# Patient Record
Sex: MALE | Race: WHITE | Employment: OTHER | URBAN - METROPOLITAN AREA
[De-identification: names, ages, dates, MRNs, and addresses within clinical notes are randomized per-mention and may not be internally consistent; named-entity substitution may affect disease eponyms.]

---

## 2018-03-13 ENCOUNTER — HOSPITAL ENCOUNTER (OUTPATIENT)
Age: 68
Discharge: HOME | End: 2018-03-13
Payer: MEDICARE

## 2018-03-13 LAB
ANION GAP SERPL CALCULATED.3IONS-SCNC: 7 MMOL/L (ref 7–16)
BUN BLDV-MCNC: 19 MG/DL (ref 8–23)
CALCIUM SERPL-MCNC: 9.5 MG/DL (ref 8.6–10.2)
CHLORIDE BLD-SCNC: 103 MMOL/L (ref 98–107)
CO2: 30 MMOL/L (ref 22–29)
CREAT SERPL-MCNC: 1 MG/DL (ref 0.7–1.2)
EKG ATRIAL RATE: 52 BPM
EKG P AXIS: 49 DEGREES
EKG P-R INTERVAL: 192 MS
EKG Q-T INTERVAL: 454 MS
EKG QRS DURATION: 98 MS
EKG QTC CALCULATION (BAZETT): 422 MS
EKG R AXIS: 14 DEGREES
EKG T AXIS: 15 DEGREES
EKG VENTRICULAR RATE: 52 BPM
GFR AFRICAN AMERICAN: >60
GFR NON-AFRICAN AMERICAN: >60 ML/MIN/1.73
GLUCOSE BLD-MCNC: 109 MG/DL (ref 74–109)
POTASSIUM SERPL-SCNC: 4.2 MMOL/L (ref 3.5–5)
SODIUM BLD-SCNC: 140 MMOL/L (ref 132–146)

## 2018-03-13 PROCEDURE — 80048 BASIC METABOLIC PNL TOTAL CA: CPT

## 2018-03-13 PROCEDURE — 36415 COLL VENOUS BLD VENIPUNCTURE: CPT

## 2018-03-13 PROCEDURE — 93005 ELECTROCARDIOGRAM TRACING: CPT | Performed by: ANESTHESIOLOGY

## 2018-03-14 ENCOUNTER — ANESTHESIA EVENT (OUTPATIENT)
Dept: OPERATING ROOM | Age: 68
End: 2018-03-14
Payer: MEDICARE

## 2018-03-14 ASSESSMENT — LIFESTYLE VARIABLES: SMOKING_STATUS: 0

## 2018-03-14 NOTE — ANESTHESIA PRE PROCEDURE
Diagnosis Date    DJD (degenerative joint disease) of knee     Hypertension        Past Surgical History:        Procedure Laterality Date    CERVICAL LAMINECTOMY  1989    JOINT REPLACEMENT      right knee    LUMBAR LAMINECTOMY  2010    MYRINGOTOMY Right 1 14 16    nasal endoscopy with biopsy,biopsy post nasal pharynx    MYRINGOTOMY AND TYMPANOSTOMY TUBE PLACEMENT Left 12/08/2016    TESTICLE SURGERY  2003    TONSILLECTOMY      TOTAL KNEE ARTHROPLASTY  1/7/2013    right        Social History:    Social History   Substance Use Topics    Smoking status: Never Smoker    Smokeless tobacco: Never Used    Alcohol use No                                Counseling given: Not Answered      Vital Signs (Current):   Vitals:    03/12/18 1407   Weight: 250 lb (113.4 kg)   Height: 6' (1.829 m)                                              BP Readings from Last 3 Encounters:   02/12/18 131/86   12/20/17 (!) 144/77   10/18/17 127/71       NPO Status:  >8.H                                                                               BMI:   Wt Readings from Last 3 Encounters:   02/12/18 259 lb 11.2 oz (117.8 kg)   12/20/17 259 lb 1.6 oz (117.5 kg)   10/18/17 253 lb 14.4 oz (115.2 kg)     Body mass index is 33.91 kg/m². CBC:   Lab Results   Component Value Date    WBC 12.6 01/10/2013    RBC 3.82 01/10/2013    HGB 12.2 01/10/2013    HCT 34.9 01/10/2013    MCV 91.3 01/10/2013    RDW 14.2 01/10/2013     01/10/2013       CMP:   Lab Results   Component Value Date     03/13/2018    K 4.2 03/13/2018     03/13/2018    CO2 30 03/13/2018    BUN 19 03/13/2018    CREATININE 1.0 03/13/2018    GFRAA >60 03/13/2018    LABGLOM >60 03/13/2018    GLUCOSE 109 03/13/2018    CALCIUM 9.5 03/13/2018       POC Tests: No results for input(s): POCGLU, POCNA, POCK, POCCL, POCBUN, POCHEMO, POCHCT in the last 72 hours.     Coags: No results found for: PROTIME, INR, APTT    HCG (If Applicable): No results found for: PREGTESTUR,

## 2018-03-15 ENCOUNTER — ANESTHESIA (OUTPATIENT)
Dept: OPERATING ROOM | Age: 68
End: 2018-03-15
Payer: MEDICARE

## 2018-03-15 ENCOUNTER — HOSPITAL ENCOUNTER (OUTPATIENT)
Age: 68
Setting detail: OUTPATIENT SURGERY
Discharge: HOME | End: 2018-03-15
Attending: OTOLARYNGOLOGY | Admitting: OTOLARYNGOLOGY
Payer: MEDICARE

## 2018-03-15 VITALS — OXYGEN SATURATION: 86 % | SYSTOLIC BLOOD PRESSURE: 111 MMHG | DIASTOLIC BLOOD PRESSURE: 60 MMHG

## 2018-03-15 VITALS
BODY MASS INDEX: 34.4 KG/M2 | RESPIRATION RATE: 14 BRPM | DIASTOLIC BLOOD PRESSURE: 76 MMHG | HEART RATE: 68 BPM | WEIGHT: 254 LBS | TEMPERATURE: 98.6 F | SYSTOLIC BLOOD PRESSURE: 126 MMHG | OXYGEN SATURATION: 98 % | HEIGHT: 72 IN

## 2018-03-15 PROCEDURE — 3600000002 HC SURGERY LEVEL 2 BASE: Performed by: OTOLARYNGOLOGY

## 2018-03-15 PROCEDURE — C1726 CATH, BAL DIL, NON-VASCULAR: HCPCS | Performed by: OTOLARYNGOLOGY

## 2018-03-15 PROCEDURE — 2580000003 HC RX 258

## 2018-03-15 PROCEDURE — 7100000011 HC PHASE II RECOVERY - ADDTL 15 MIN: Performed by: OTOLARYNGOLOGY

## 2018-03-15 PROCEDURE — 3700000001 HC ADD 15 MINUTES (ANESTHESIA): Performed by: OTOLARYNGOLOGY

## 2018-03-15 PROCEDURE — 2780000010 HC IMPLANT OTHER: Performed by: OTOLARYNGOLOGY

## 2018-03-15 PROCEDURE — 7100000001 HC PACU RECOVERY - ADDTL 15 MIN: Performed by: OTOLARYNGOLOGY

## 2018-03-15 PROCEDURE — 6360000002 HC RX W HCPCS: Performed by: NURSE ANESTHETIST, CERTIFIED REGISTERED

## 2018-03-15 PROCEDURE — 31299 UNLISTED PX ACCESSORY SINUS: CPT | Performed by: OTOLARYNGOLOGY

## 2018-03-15 PROCEDURE — 2500000003 HC RX 250 WO HCPCS: Performed by: NURSE ANESTHETIST, CERTIFIED REGISTERED

## 2018-03-15 PROCEDURE — 7100000010 HC PHASE II RECOVERY - FIRST 15 MIN: Performed by: OTOLARYNGOLOGY

## 2018-03-15 PROCEDURE — 69436 CREATE EARDRUM OPENING: CPT | Performed by: OTOLARYNGOLOGY

## 2018-03-15 PROCEDURE — 3600000012 HC SURGERY LEVEL 2 ADDTL 15MIN: Performed by: OTOLARYNGOLOGY

## 2018-03-15 PROCEDURE — 3700000000 HC ANESTHESIA ATTENDED CARE: Performed by: OTOLARYNGOLOGY

## 2018-03-15 PROCEDURE — 6370000000 HC RX 637 (ALT 250 FOR IP): Performed by: OTOLARYNGOLOGY

## 2018-03-15 PROCEDURE — 7100000000 HC PACU RECOVERY - FIRST 15 MIN: Performed by: OTOLARYNGOLOGY

## 2018-03-15 DEVICE — IMPLANTABLE DEVICE: Type: IMPLANTABLE DEVICE | Site: EAR | Status: FUNCTIONAL

## 2018-03-15 RX ORDER — FLUTICASONE PROPIONATE 50 MCG
2 SPRAY, SUSPENSION (ML) NASAL DAILY
Qty: 1 BOTTLE | Refills: 3 | Status: SHIPPED | OUTPATIENT
Start: 2018-03-15 | End: 2018-03-21 | Stop reason: CLARIF

## 2018-03-15 RX ORDER — MORPHINE SULFATE 2 MG/ML
1 INJECTION, SOLUTION INTRAMUSCULAR; INTRAVENOUS EVERY 5 MIN PRN
Status: DISCONTINUED | OUTPATIENT
Start: 2018-03-15 | End: 2018-03-15 | Stop reason: HOSPADM

## 2018-03-15 RX ORDER — MEPERIDINE HYDROCHLORIDE 50 MG/ML
12.5 INJECTION INTRAMUSCULAR; INTRAVENOUS; SUBCUTANEOUS EVERY 5 MIN PRN
Status: DISCONTINUED | OUTPATIENT
Start: 2018-03-15 | End: 2018-03-15 | Stop reason: HOSPADM

## 2018-03-15 RX ORDER — PROPOFOL 10 MG/ML
INJECTION, EMULSION INTRAVENOUS PRN
Status: DISCONTINUED | OUTPATIENT
Start: 2018-03-15 | End: 2018-03-15 | Stop reason: SDUPTHER

## 2018-03-15 RX ORDER — CIPROFLOXACIN AND DEXAMETHASONE 3; 1 MG/ML; MG/ML
3 SUSPENSION/ DROPS AURICULAR (OTIC) 2 TIMES DAILY
Qty: 7.5 ML | Refills: 1 | Status: SHIPPED | OUTPATIENT
Start: 2018-03-15 | End: 2018-03-18

## 2018-03-15 RX ORDER — GLYCOPYRROLATE 0.2 MG/ML
INJECTION INTRAMUSCULAR; INTRAVENOUS PRN
Status: DISCONTINUED | OUTPATIENT
Start: 2018-03-15 | End: 2018-03-15 | Stop reason: SDUPTHER

## 2018-03-15 RX ORDER — HYDROCODONE BITARTRATE AND ACETAMINOPHEN 5; 325 MG/1; MG/1
1 TABLET ORAL PRN
Status: DISCONTINUED | OUTPATIENT
Start: 2018-03-15 | End: 2018-03-15 | Stop reason: HOSPADM

## 2018-03-15 RX ORDER — SODIUM CHLORIDE 0.9 % (FLUSH) 0.9 %
10 SYRINGE (ML) INJECTION PRN
Status: DISCONTINUED | OUTPATIENT
Start: 2018-03-15 | End: 2018-03-15 | Stop reason: HOSPADM

## 2018-03-15 RX ORDER — SODIUM CHLORIDE, SODIUM LACTATE, POTASSIUM CHLORIDE, CALCIUM CHLORIDE 600; 310; 30; 20 MG/100ML; MG/100ML; MG/100ML; MG/100ML
INJECTION, SOLUTION INTRAVENOUS CONTINUOUS
Status: DISCONTINUED | OUTPATIENT
Start: 2018-03-15 | End: 2018-03-15 | Stop reason: HOSPADM

## 2018-03-15 RX ORDER — DEXAMETHASONE SODIUM PHOSPHATE 10 MG/ML
INJECTION, SOLUTION INTRAMUSCULAR; INTRAVENOUS PRN
Status: DISCONTINUED | OUTPATIENT
Start: 2018-03-15 | End: 2018-03-15 | Stop reason: SDUPTHER

## 2018-03-15 RX ORDER — CIPROFLOXACIN AND DEXAMETHASONE 3; 1 MG/ML; MG/ML
3 SUSPENSION/ DROPS AURICULAR (OTIC) 2 TIMES DAILY
Qty: 7.5 ML | Refills: 1 | Status: SHIPPED | OUTPATIENT
Start: 2018-03-15 | End: 2018-03-15

## 2018-03-15 RX ORDER — PROMETHAZINE HYDROCHLORIDE 25 MG/ML
25 INJECTION, SOLUTION INTRAMUSCULAR; INTRAVENOUS
Status: DISCONTINUED | OUTPATIENT
Start: 2018-03-15 | End: 2018-03-15 | Stop reason: HOSPADM

## 2018-03-15 RX ORDER — LABETALOL HYDROCHLORIDE 5 MG/ML
5 INJECTION, SOLUTION INTRAVENOUS EVERY 10 MIN PRN
Status: DISCONTINUED | OUTPATIENT
Start: 2018-03-15 | End: 2018-03-15 | Stop reason: HOSPADM

## 2018-03-15 RX ORDER — SODIUM CHLORIDE 0.9 % (FLUSH) 0.9 %
10 SYRINGE (ML) INJECTION EVERY 12 HOURS SCHEDULED
Status: DISCONTINUED | OUTPATIENT
Start: 2018-03-15 | End: 2018-03-15 | Stop reason: HOSPADM

## 2018-03-15 RX ORDER — FENTANYL CITRATE 50 UG/ML
50 INJECTION, SOLUTION INTRAMUSCULAR; INTRAVENOUS EVERY 5 MIN PRN
Status: DISCONTINUED | OUTPATIENT
Start: 2018-03-15 | End: 2018-03-15 | Stop reason: HOSPADM

## 2018-03-15 RX ORDER — LIDOCAINE HYDROCHLORIDE 20 MG/ML
INJECTION, SOLUTION EPIDURAL; INFILTRATION; INTRACAUDAL; PERINEURAL PRN
Status: DISCONTINUED | OUTPATIENT
Start: 2018-03-15 | End: 2018-03-15 | Stop reason: SDUPTHER

## 2018-03-15 RX ORDER — HYDRALAZINE HYDROCHLORIDE 20 MG/ML
5 INJECTION INTRAMUSCULAR; INTRAVENOUS EVERY 10 MIN PRN
Status: DISCONTINUED | OUTPATIENT
Start: 2018-03-15 | End: 2018-03-15 | Stop reason: HOSPADM

## 2018-03-15 RX ORDER — DIPHENHYDRAMINE HYDROCHLORIDE 50 MG/ML
12.5 INJECTION INTRAMUSCULAR; INTRAVENOUS
Status: DISCONTINUED | OUTPATIENT
Start: 2018-03-15 | End: 2018-03-15 | Stop reason: HOSPADM

## 2018-03-15 RX ORDER — HYDROCODONE BITARTRATE AND ACETAMINOPHEN 5; 325 MG/1; MG/1
2 TABLET ORAL PRN
Status: DISCONTINUED | OUTPATIENT
Start: 2018-03-15 | End: 2018-03-15 | Stop reason: HOSPADM

## 2018-03-15 RX ORDER — OXYMETAZOLINE HYDROCHLORIDE 0.05 G/100ML
SPRAY NASAL PRN
Status: DISCONTINUED | OUTPATIENT
Start: 2018-03-15 | End: 2018-03-15 | Stop reason: HOSPADM

## 2018-03-15 RX ORDER — MIDAZOLAM HYDROCHLORIDE 1 MG/ML
INJECTION INTRAMUSCULAR; INTRAVENOUS PRN
Status: DISCONTINUED | OUTPATIENT
Start: 2018-03-15 | End: 2018-03-15 | Stop reason: SDUPTHER

## 2018-03-15 RX ORDER — FENTANYL CITRATE 50 UG/ML
INJECTION, SOLUTION INTRAMUSCULAR; INTRAVENOUS PRN
Status: DISCONTINUED | OUTPATIENT
Start: 2018-03-15 | End: 2018-03-15 | Stop reason: SDUPTHER

## 2018-03-15 RX ORDER — ONDANSETRON 2 MG/ML
INJECTION INTRAMUSCULAR; INTRAVENOUS PRN
Status: DISCONTINUED | OUTPATIENT
Start: 2018-03-15 | End: 2018-03-15 | Stop reason: SDUPTHER

## 2018-03-15 RX ORDER — OFLOXACIN 3 MG/ML
SOLUTION/ DROPS OPHTHALMIC PRN
Status: DISCONTINUED | OUTPATIENT
Start: 2018-03-15 | End: 2018-03-15 | Stop reason: HOSPADM

## 2018-03-15 RX ADMIN — FENTANYL CITRATE 100 MCG: 50 INJECTION, SOLUTION INTRAMUSCULAR; INTRAVENOUS at 13:28

## 2018-03-15 RX ADMIN — GLYCOPYRROLATE 0.2 MG: 0.2 INJECTION INTRAMUSCULAR; INTRAVENOUS at 13:25

## 2018-03-15 RX ADMIN — SODIUM CHLORIDE, SODIUM LACTATE, POTASSIUM CHLORIDE, CALCIUM CHLORIDE: 600; 310; 30; 20 INJECTION, SOLUTION INTRAVENOUS at 11:20

## 2018-03-15 RX ADMIN — DEXAMETHASONE SODIUM PHOSPHATE 10 MG: 10 INJECTION, SOLUTION INTRAMUSCULAR; INTRAVENOUS at 13:37

## 2018-03-15 RX ADMIN — PROPOFOL 200 MG: 10 INJECTION, EMULSION INTRAVENOUS at 13:32

## 2018-03-15 RX ADMIN — LIDOCAINE HYDROCHLORIDE 50 MG: 20 INJECTION, SOLUTION EPIDURAL; INFILTRATION; INTRACAUDAL; PERINEURAL at 13:32

## 2018-03-15 RX ADMIN — FENTANYL CITRATE 50 MCG: 50 INJECTION, SOLUTION INTRAMUSCULAR; INTRAVENOUS at 13:24

## 2018-03-15 RX ADMIN — MIDAZOLAM HYDROCHLORIDE 2 MG: 1 INJECTION INTRAMUSCULAR; INTRAVENOUS at 13:24

## 2018-03-15 RX ADMIN — ONDANSETRON 4 MG: 2 INJECTION, SOLUTION INTRAMUSCULAR; INTRAVENOUS at 13:37

## 2018-03-15 ASSESSMENT — PULMONARY FUNCTION TESTS
PIF_VALUE: 19
PIF_VALUE: 0
PIF_VALUE: 19
PIF_VALUE: 18
PIF_VALUE: 18
PIF_VALUE: 3
PIF_VALUE: 19
PIF_VALUE: 10
PIF_VALUE: 19
PIF_VALUE: 20
PIF_VALUE: 1
PIF_VALUE: 18
PIF_VALUE: 0
PIF_VALUE: 25
PIF_VALUE: 21
PIF_VALUE: 4
PIF_VALUE: 1
PIF_VALUE: 4
PIF_VALUE: 0
PIF_VALUE: 19
PIF_VALUE: 4
PIF_VALUE: 36
PIF_VALUE: 1
PIF_VALUE: 22
PIF_VALUE: 16
PIF_VALUE: 0
PIF_VALUE: 15

## 2018-03-15 ASSESSMENT — PAIN - FUNCTIONAL ASSESSMENT: PAIN_FUNCTIONAL_ASSESSMENT: 0-10

## 2018-03-15 ASSESSMENT — PAIN SCALES - GENERAL
PAINLEVEL_OUTOF10: 0

## 2018-03-21 ENCOUNTER — OFFICE VISIT (OUTPATIENT)
Dept: ENT CLINIC | Age: 68
End: 2018-03-21

## 2018-03-21 VITALS
DIASTOLIC BLOOD PRESSURE: 76 MMHG | WEIGHT: 258.1 LBS | HEART RATE: 65 BPM | HEIGHT: 72 IN | BODY MASS INDEX: 34.96 KG/M2 | SYSTOLIC BLOOD PRESSURE: 137 MMHG

## 2018-03-21 DIAGNOSIS — H65.493 CHRONIC OTITIS MEDIA WITH EFFUSION, BILATERAL: Primary | ICD-10-CM

## 2018-03-21 PROCEDURE — 99024 POSTOP FOLLOW-UP VISIT: CPT | Performed by: OTOLARYNGOLOGY

## 2018-04-04 ASSESSMENT — ENCOUNTER SYMPTOMS
HEARTBURN: 0
VOMITING: 0
BLURRED VISION: 0
COUGH: 0
SHORTNESS OF BREATH: 0
DOUBLE VISION: 0

## 2018-05-22 ENCOUNTER — OFFICE VISIT (OUTPATIENT)
Dept: ORTHOPEDIC SURGERY | Age: 68
End: 2018-05-22
Payer: MEDICARE

## 2018-05-22 VITALS
BODY MASS INDEX: 33.86 KG/M2 | HEART RATE: 74 BPM | SYSTOLIC BLOOD PRESSURE: 134 MMHG | TEMPERATURE: 97.2 F | WEIGHT: 250 LBS | DIASTOLIC BLOOD PRESSURE: 79 MMHG | HEIGHT: 72 IN

## 2018-05-22 DIAGNOSIS — M76.62 TENDONITIS, ACHILLES, LEFT: Primary | ICD-10-CM

## 2018-05-22 PROCEDURE — 99203 OFFICE O/P NEW LOW 30 MIN: CPT | Performed by: ORTHOPAEDIC SURGERY

## 2018-05-22 PROCEDURE — G8427 DOCREV CUR MEDS BY ELIG CLIN: HCPCS | Performed by: ORTHOPAEDIC SURGERY

## 2018-05-22 PROCEDURE — 1123F ACP DISCUSS/DSCN MKR DOCD: CPT | Performed by: ORTHOPAEDIC SURGERY

## 2018-05-22 PROCEDURE — 4040F PNEUMOC VAC/ADMIN/RCVD: CPT | Performed by: ORTHOPAEDIC SURGERY

## 2018-05-22 PROCEDURE — G8417 CALC BMI ABV UP PARAM F/U: HCPCS | Performed by: ORTHOPAEDIC SURGERY

## 2018-05-22 PROCEDURE — 3017F COLORECTAL CA SCREEN DOC REV: CPT | Performed by: ORTHOPAEDIC SURGERY

## 2018-05-22 PROCEDURE — 1036F TOBACCO NON-USER: CPT | Performed by: ORTHOPAEDIC SURGERY

## 2018-05-23 ENCOUNTER — EVALUATION (OUTPATIENT)
Dept: PHYSICAL THERAPY | Age: 68
End: 2018-05-23
Payer: MEDICARE

## 2018-05-23 DIAGNOSIS — M62.562 MUSCLE WASTING AND ATROPHY, NOT ELSEWHERE CLASSIFIED, LEFT LOWER LEG: ICD-10-CM

## 2018-05-23 DIAGNOSIS — M76.62 TENDONITIS, ACHILLES, LEFT: Primary | ICD-10-CM

## 2018-05-23 PROCEDURE — 97535 SELF CARE MNGMENT TRAINING: CPT | Performed by: PHYSICAL THERAPIST

## 2018-05-23 PROCEDURE — 97161 PT EVAL LOW COMPLEX 20 MIN: CPT | Performed by: PHYSICAL THERAPIST

## 2018-05-23 PROCEDURE — G8978 MOBILITY CURRENT STATUS: HCPCS | Performed by: PHYSICAL THERAPIST

## 2018-05-23 PROCEDURE — G8979 MOBILITY GOAL STATUS: HCPCS | Performed by: PHYSICAL THERAPIST

## 2018-05-30 ENCOUNTER — TREATMENT (OUTPATIENT)
Dept: PHYSICAL THERAPY | Age: 68
End: 2018-05-30
Payer: MEDICARE

## 2018-05-30 DIAGNOSIS — M76.62 TENDONITIS, ACHILLES, LEFT: Primary | ICD-10-CM

## 2018-05-30 DIAGNOSIS — M62.562 MUSCLE WASTING AND ATROPHY, NOT ELSEWHERE CLASSIFIED, LEFT LOWER LEG: ICD-10-CM

## 2018-05-30 PROCEDURE — G8980 MOBILITY D/C STATUS: HCPCS | Performed by: PHYSICAL THERAPIST

## 2018-05-30 PROCEDURE — 97535 SELF CARE MNGMENT TRAINING: CPT | Performed by: PHYSICAL THERAPIST

## 2018-05-30 PROCEDURE — 97164 PT RE-EVAL EST PLAN CARE: CPT | Performed by: PHYSICAL THERAPIST

## 2018-05-30 PROCEDURE — G8979 MOBILITY GOAL STATUS: HCPCS | Performed by: PHYSICAL THERAPIST

## 2018-07-24 ENCOUNTER — OFFICE VISIT (OUTPATIENT)
Dept: ENT CLINIC | Age: 68
End: 2018-07-24
Payer: MEDICARE

## 2018-07-24 ENCOUNTER — PROCEDURE VISIT (OUTPATIENT)
Dept: AUDIOLOGY | Age: 68
End: 2018-07-24

## 2018-07-24 VITALS
DIASTOLIC BLOOD PRESSURE: 75 MMHG | HEART RATE: 64 BPM | HEIGHT: 72 IN | BODY MASS INDEX: 34.35 KG/M2 | WEIGHT: 253.6 LBS | SYSTOLIC BLOOD PRESSURE: 144 MMHG

## 2018-07-24 DIAGNOSIS — H69.83 ETD (EUSTACHIAN TUBE DYSFUNCTION), BILATERAL: ICD-10-CM

## 2018-07-24 DIAGNOSIS — H61.21 IMPACTED CERUMEN OF RIGHT EAR: Primary | ICD-10-CM

## 2018-07-24 DIAGNOSIS — Z96.22 S/P TYMPANOSTOMY TUBE PLACEMENT: ICD-10-CM

## 2018-07-24 DIAGNOSIS — Z09 OTITIS MEDIA FOLLOW-UP, INFECTION RESOLVED: Primary | ICD-10-CM

## 2018-07-24 DIAGNOSIS — Z86.69 OTITIS MEDIA FOLLOW-UP, INFECTION RESOLVED: Primary | ICD-10-CM

## 2018-07-24 PROCEDURE — 69210 REMOVE IMPACTED EAR WAX UNI: CPT | Performed by: OTOLARYNGOLOGY

## 2018-07-24 PROCEDURE — 4040F PNEUMOC VAC/ADMIN/RCVD: CPT | Performed by: OTOLARYNGOLOGY

## 2018-07-24 PROCEDURE — 3017F COLORECTAL CA SCREEN DOC REV: CPT | Performed by: OTOLARYNGOLOGY

## 2018-07-24 PROCEDURE — 99213 OFFICE O/P EST LOW 20 MIN: CPT | Performed by: OTOLARYNGOLOGY

## 2018-07-24 PROCEDURE — 1036F TOBACCO NON-USER: CPT | Performed by: OTOLARYNGOLOGY

## 2018-07-24 PROCEDURE — G8427 DOCREV CUR MEDS BY ELIG CLIN: HCPCS | Performed by: OTOLARYNGOLOGY

## 2018-07-24 PROCEDURE — 1123F ACP DISCUSS/DSCN MKR DOCD: CPT | Performed by: OTOLARYNGOLOGY

## 2018-07-24 PROCEDURE — 1101F PT FALLS ASSESS-DOCD LE1/YR: CPT | Performed by: OTOLARYNGOLOGY

## 2018-07-24 PROCEDURE — G8417 CALC BMI ABV UP PARAM F/U: HCPCS | Performed by: OTOLARYNGOLOGY

## 2018-07-24 ASSESSMENT — ENCOUNTER SYMPTOMS
DOUBLE VISION: 0
BLURRED VISION: 0
HEARTBURN: 0
COUGH: 0
VOMITING: 0
SHORTNESS OF BREATH: 0

## 2018-07-24 NOTE — PROGRESS NOTES
Tympanometry was attempted several times, but a seal could not be obtained, bilaterally, which indicates patent PE tubes.

## 2018-07-24 NOTE — PROGRESS NOTES
[facility] Otolaryngology  Dr. Kiley PORTER Suzie De La Vega, [address] Follow Up        Patient Name:  Laya Whitman  :  1950     CHIEF C/O:    Chief Complaint   Patient presents with    Follow-up     4 month follow up tubes right ear fullness        HISTORY OBTAINED FROM:  patient    HISTORY OF PRESENT ILLNESS:       Timo Quiroga is a 80y.o. year old male, here today for follow up of status post left myringotomy with eustachian tubal dilation 3/18 and right T-tube placed . Patient is doing very well, states that hearing is significantly improved without any ear pain or drainage. Patient has been using Flonase consistently. No current complaint of nasal congestion or epistaxis. Review of Systems   Constitutional: Negative for chills and fever. HENT: Positive for congestion. Negative for ear discharge and hearing loss. Eyes: Negative for blurred vision and double vision. Respiratory: Negative for cough and shortness of breath. Cardiovascular: Negative for chest pain and palpitations. Gastrointestinal: Negative for heartburn and vomiting. Skin: Negative for itching and rash. Neurological: Negative for dizziness, tingling and headaches. Endo/Heme/Allergies: Negative for environmental allergies. Does not bruise/bleed easily. All other systems reviewed and are negative. BP (!) 144/75 (Site: Left Arm, Position: Sitting, Cuff Size: Large Adult)   Pulse 64   Ht 6' (1.829 m)   Wt 253 lb 9.6 oz (115 kg)   BMI 34.39 kg/m²   Physical Exam   Constitutional: He is oriented to person, place, and time. He appears well-developed and well-nourished. HENT:   Ears:    Mouth/Throat:       Eyes: Conjunctivae and EOM are normal. Pupils are equal, round, and reactive to light. Neck: Normal range of motion. Neck supple. Cardiovascular: Normal rate, regular rhythm, normal heart sounds and intact distal pulses. Pulmonary/Chest: Effort normal and breath sounds normal.   Abdominal: Soft.  Bowel sounds

## 2018-08-22 ENCOUNTER — TELEPHONE (OUTPATIENT)
Dept: ENT CLINIC | Age: 68
End: 2018-08-22

## 2018-08-22 DIAGNOSIS — H69.83 ETD (EUSTACHIAN TUBE DYSFUNCTION), BILATERAL: ICD-10-CM

## 2018-08-23 RX ORDER — FLUTICASONE PROPIONATE 50 MCG
2 SPRAY, SUSPENSION (ML) NASAL DAILY
Qty: 1 BOTTLE | Refills: 3 | Status: SHIPPED | OUTPATIENT
Start: 2018-08-23 | End: 2019-05-03 | Stop reason: SDUPTHER

## 2018-11-27 ENCOUNTER — OFFICE VISIT (OUTPATIENT)
Dept: ENT CLINIC | Age: 68
End: 2018-11-27
Payer: MEDICARE

## 2018-11-27 ENCOUNTER — PROCEDURE VISIT (OUTPATIENT)
Dept: AUDIOLOGY | Age: 68
End: 2018-11-27
Payer: MEDICARE

## 2018-11-27 VITALS
BODY MASS INDEX: 34.1 KG/M2 | HEART RATE: 65 BPM | HEIGHT: 72 IN | SYSTOLIC BLOOD PRESSURE: 149 MMHG | WEIGHT: 251.8 LBS | DIASTOLIC BLOOD PRESSURE: 78 MMHG

## 2018-11-27 DIAGNOSIS — H90.0 CONDUCTIVE HEARING LOSS, BILATERAL: Primary | ICD-10-CM

## 2018-11-27 DIAGNOSIS — H65.493 COME (CHRONIC OTITIS MEDIA WITH EFFUSION), BILATERAL: ICD-10-CM

## 2018-11-27 DIAGNOSIS — H69.83 ETD (EUSTACHIAN TUBE DYSFUNCTION), BILATERAL: ICD-10-CM

## 2018-11-27 DIAGNOSIS — H65.33 CHRONIC MUCOID OTITIS MEDIA OF BOTH EARS: Primary | ICD-10-CM

## 2018-11-27 PROCEDURE — 99213 OFFICE O/P EST LOW 20 MIN: CPT | Performed by: OTOLARYNGOLOGY

## 2018-11-27 PROCEDURE — 1101F PT FALLS ASSESS-DOCD LE1/YR: CPT | Performed by: OTOLARYNGOLOGY

## 2018-11-27 PROCEDURE — 1123F ACP DISCUSS/DSCN MKR DOCD: CPT | Performed by: OTOLARYNGOLOGY

## 2018-11-27 PROCEDURE — G8417 CALC BMI ABV UP PARAM F/U: HCPCS | Performed by: OTOLARYNGOLOGY

## 2018-11-27 PROCEDURE — 1036F TOBACCO NON-USER: CPT | Performed by: OTOLARYNGOLOGY

## 2018-11-27 PROCEDURE — 3017F COLORECTAL CA SCREEN DOC REV: CPT | Performed by: OTOLARYNGOLOGY

## 2018-11-27 PROCEDURE — 4040F PNEUMOC VAC/ADMIN/RCVD: CPT | Performed by: OTOLARYNGOLOGY

## 2018-11-27 PROCEDURE — G8427 DOCREV CUR MEDS BY ELIG CLIN: HCPCS | Performed by: OTOLARYNGOLOGY

## 2018-11-27 PROCEDURE — 92567 TYMPANOMETRY: CPT | Performed by: AUDIOLOGIST

## 2018-11-27 PROCEDURE — G8484 FLU IMMUNIZE NO ADMIN: HCPCS | Performed by: OTOLARYNGOLOGY

## 2018-11-27 RX ORDER — FLUTICASONE PROPIONATE 50 MCG
SPRAY, SUSPENSION (ML) NASAL
Qty: 1 BOTTLE | Refills: 3 | Status: SHIPPED | OUTPATIENT
Start: 2018-11-27 | End: 2019-01-03 | Stop reason: SDUPTHER

## 2018-11-27 RX ORDER — CIPROFLOXACIN HYDROCHLORIDE 3.5 MG/ML
3 SOLUTION/ DROPS TOPICAL 2 TIMES DAILY
Qty: 1 BOTTLE | Refills: 0 | Status: SHIPPED | OUTPATIENT
Start: 2018-11-27 | End: 2019-01-18 | Stop reason: ALTCHOICE

## 2018-11-29 NOTE — PROGRESS NOTES
This patient was referred for tympanometric testing by Dr. Sade Echols. Patient being seen for a 4-month PE tube follow-up, with no acute issues. Tympanometry revealed a large ear canal volume, right ear and no seal could be obtained, left ear. These test results indicate patient PE tubes, bilaterally. Ipsilateral acoustic reflexes were present, right ear and could not be tested, lefte ar at 1000Hz. The results were reviewed with the patient. Recommendations for follow up will be made pending physician consult.     Alvina Napier

## 2018-12-11 ASSESSMENT — ENCOUNTER SYMPTOMS
TROUBLE SWALLOWING: 0
SHORTNESS OF BREATH: 0
SINUS PAIN: 0
COUGH: 0
VOMITING: 0
VOICE CHANGE: 0

## 2019-01-03 ENCOUNTER — OFFICE VISIT (OUTPATIENT)
Dept: ENT CLINIC | Age: 69
End: 2019-01-03
Payer: MEDICARE

## 2019-01-03 VITALS
WEIGHT: 257 LBS | SYSTOLIC BLOOD PRESSURE: 138 MMHG | HEIGHT: 72 IN | BODY MASS INDEX: 34.81 KG/M2 | HEART RATE: 65 BPM | DIASTOLIC BLOOD PRESSURE: 80 MMHG

## 2019-01-03 DIAGNOSIS — H92.21 BLEEDING FROM EAR, RIGHT: Primary | ICD-10-CM

## 2019-01-03 DIAGNOSIS — H66.91 RIGHT OTITIS MEDIA, UNSPECIFIED OTITIS MEDIA TYPE: ICD-10-CM

## 2019-01-03 DIAGNOSIS — Z96.22 HISTORY OF TYMPANOSTOMY TUBE PLACEMENT: ICD-10-CM

## 2019-01-03 PROCEDURE — 4040F PNEUMOC VAC/ADMIN/RCVD: CPT | Performed by: PHYSICIAN ASSISTANT

## 2019-01-03 PROCEDURE — G8427 DOCREV CUR MEDS BY ELIG CLIN: HCPCS | Performed by: PHYSICIAN ASSISTANT

## 2019-01-03 PROCEDURE — 99213 OFFICE O/P EST LOW 20 MIN: CPT | Performed by: PHYSICIAN ASSISTANT

## 2019-01-03 PROCEDURE — G8484 FLU IMMUNIZE NO ADMIN: HCPCS | Performed by: PHYSICIAN ASSISTANT

## 2019-01-03 PROCEDURE — 1123F ACP DISCUSS/DSCN MKR DOCD: CPT | Performed by: PHYSICIAN ASSISTANT

## 2019-01-03 PROCEDURE — G8417 CALC BMI ABV UP PARAM F/U: HCPCS | Performed by: PHYSICIAN ASSISTANT

## 2019-01-03 PROCEDURE — 1036F TOBACCO NON-USER: CPT | Performed by: PHYSICIAN ASSISTANT

## 2019-01-03 PROCEDURE — 92504 EAR MICROSCOPY EXAMINATION: CPT | Performed by: PHYSICIAN ASSISTANT

## 2019-01-03 PROCEDURE — 1101F PT FALLS ASSESS-DOCD LE1/YR: CPT | Performed by: PHYSICIAN ASSISTANT

## 2019-01-03 PROCEDURE — 3017F COLORECTAL CA SCREEN DOC REV: CPT | Performed by: PHYSICIAN ASSISTANT

## 2019-01-03 ASSESSMENT — ENCOUNTER SYMPTOMS
COUGH: 0
VOMITING: 0
TROUBLE SWALLOWING: 0
SHORTNESS OF BREATH: 0
SINUS PAIN: 0
VOICE CHANGE: 0

## 2019-01-07 ENCOUNTER — OFFICE VISIT (OUTPATIENT)
Dept: ENT CLINIC | Age: 69
End: 2019-01-07
Payer: MEDICARE

## 2019-01-07 ENCOUNTER — PROCEDURE VISIT (OUTPATIENT)
Dept: AUDIOLOGY | Age: 69
End: 2019-01-07
Payer: MEDICARE

## 2019-01-07 VITALS
BODY MASS INDEX: 35.99 KG/M2 | HEIGHT: 72 IN | SYSTOLIC BLOOD PRESSURE: 138 MMHG | HEART RATE: 65 BPM | WEIGHT: 265.7 LBS | DIASTOLIC BLOOD PRESSURE: 78 MMHG

## 2019-01-07 DIAGNOSIS — H65.33 CHRONIC MUCOID OTITIS MEDIA OF BOTH EARS: Primary | ICD-10-CM

## 2019-01-07 DIAGNOSIS — Z96.22 HISTORY OF TYMPANOSTOMY TUBE PLACEMENT: ICD-10-CM

## 2019-01-07 DIAGNOSIS — H92.21 BLEEDING FROM EAR, RIGHT: Primary | ICD-10-CM

## 2019-01-07 DIAGNOSIS — H69.83 ETD (EUSTACHIAN TUBE DYSFUNCTION), BILATERAL: ICD-10-CM

## 2019-01-07 PROCEDURE — 3017F COLORECTAL CA SCREEN DOC REV: CPT | Performed by: OTOLARYNGOLOGY

## 2019-01-07 PROCEDURE — 1036F TOBACCO NON-USER: CPT | Performed by: OTOLARYNGOLOGY

## 2019-01-07 PROCEDURE — 1101F PT FALLS ASSESS-DOCD LE1/YR: CPT | Performed by: OTOLARYNGOLOGY

## 2019-01-07 PROCEDURE — 92567 TYMPANOMETRY: CPT | Performed by: AUDIOLOGIST

## 2019-01-07 PROCEDURE — 99213 OFFICE O/P EST LOW 20 MIN: CPT | Performed by: OTOLARYNGOLOGY

## 2019-01-07 PROCEDURE — G8417 CALC BMI ABV UP PARAM F/U: HCPCS | Performed by: OTOLARYNGOLOGY

## 2019-01-07 PROCEDURE — 1123F ACP DISCUSS/DSCN MKR DOCD: CPT | Performed by: OTOLARYNGOLOGY

## 2019-01-07 PROCEDURE — G8427 DOCREV CUR MEDS BY ELIG CLIN: HCPCS | Performed by: OTOLARYNGOLOGY

## 2019-01-07 PROCEDURE — 4040F PNEUMOC VAC/ADMIN/RCVD: CPT | Performed by: OTOLARYNGOLOGY

## 2019-01-07 PROCEDURE — G8484 FLU IMMUNIZE NO ADMIN: HCPCS | Performed by: OTOLARYNGOLOGY

## 2019-01-07 ASSESSMENT — ENCOUNTER SYMPTOMS
VOICE CHANGE: 0
SINUS PAIN: 0
SHORTNESS OF BREATH: 0
VOMITING: 0
TROUBLE SWALLOWING: 0
COUGH: 0

## 2019-01-21 ENCOUNTER — HOSPITAL ENCOUNTER (OUTPATIENT)
Age: 69
Discharge: HOME | End: 2019-01-21
Payer: MEDICARE

## 2019-01-21 ENCOUNTER — HOSPITAL ENCOUNTER (OUTPATIENT)
Dept: NON INVASIVE DIAGNOSTICS | Age: 69
Discharge: HOME | End: 2019-01-21
Payer: MEDICARE

## 2019-01-21 LAB
ANION GAP SERPL CALCULATED.3IONS-SCNC: 10 MMOL/L (ref 7–16)
BUN BLDV-MCNC: 17 MG/DL (ref 8–23)
CALCIUM SERPL-MCNC: 9.2 MG/DL (ref 8.6–10.2)
CHLORIDE BLD-SCNC: 102 MMOL/L (ref 98–107)
CO2: 26 MMOL/L (ref 22–29)
CREAT SERPL-MCNC: 0.9 MG/DL (ref 0.7–1.2)
EKG ATRIAL RATE: 62 BPM
EKG P-R INTERVAL: 182 MS
EKG Q-T INTERVAL: 438 MS
EKG QRS DURATION: 98 MS
EKG QTC CALCULATION (BAZETT): 444 MS
EKG R AXIS: 9 DEGREES
EKG T AXIS: 18 DEGREES
EKG VENTRICULAR RATE: 62 BPM
GFR AFRICAN AMERICAN: >60
GFR NON-AFRICAN AMERICAN: >60 ML/MIN/1.73
GLUCOSE BLD-MCNC: 102 MG/DL (ref 74–99)
POTASSIUM SERPL-SCNC: 4.1 MMOL/L (ref 3.5–5)
SODIUM BLD-SCNC: 138 MMOL/L (ref 132–146)

## 2019-01-21 PROCEDURE — 80048 BASIC METABOLIC PNL TOTAL CA: CPT

## 2019-01-21 PROCEDURE — 93005 ELECTROCARDIOGRAM TRACING: CPT | Performed by: ANESTHESIOLOGY

## 2019-01-21 PROCEDURE — 36415 COLL VENOUS BLD VENIPUNCTURE: CPT

## 2019-01-23 ENCOUNTER — TELEPHONE (OUTPATIENT)
Dept: ADMINISTRATIVE | Age: 69
End: 2019-01-23

## 2019-01-23 ENCOUNTER — ANESTHESIA EVENT (OUTPATIENT)
Dept: OPERATING ROOM | Age: 69
End: 2019-01-23
Payer: MEDICARE

## 2019-01-23 PROCEDURE — 93010 ELECTROCARDIOGRAM REPORT: CPT | Performed by: INTERNAL MEDICINE

## 2019-01-23 ASSESSMENT — LIFESTYLE VARIABLES: SMOKING_STATUS: 0

## 2019-01-24 ENCOUNTER — ANESTHESIA (OUTPATIENT)
Dept: OPERATING ROOM | Age: 69
End: 2019-01-24
Payer: MEDICARE

## 2019-01-24 ENCOUNTER — HOSPITAL ENCOUNTER (OUTPATIENT)
Age: 69
Setting detail: OUTPATIENT SURGERY
Discharge: HOME | End: 2019-01-24
Attending: OTOLARYNGOLOGY | Admitting: OTOLARYNGOLOGY
Payer: MEDICARE

## 2019-01-24 VITALS
WEIGHT: 260 LBS | RESPIRATION RATE: 16 BRPM | HEIGHT: 72 IN | HEART RATE: 63 BPM | TEMPERATURE: 98.6 F | BODY MASS INDEX: 35.21 KG/M2 | SYSTOLIC BLOOD PRESSURE: 120 MMHG | DIASTOLIC BLOOD PRESSURE: 70 MMHG | OXYGEN SATURATION: 97 %

## 2019-01-24 VITALS
RESPIRATION RATE: 13 BRPM | OXYGEN SATURATION: 93 % | SYSTOLIC BLOOD PRESSURE: 87 MMHG | DIASTOLIC BLOOD PRESSURE: 54 MMHG

## 2019-01-24 PROCEDURE — 7100000000 HC PACU RECOVERY - FIRST 15 MIN: Performed by: OTOLARYNGOLOGY

## 2019-01-24 PROCEDURE — 6360000002 HC RX W HCPCS: Performed by: NURSE ANESTHETIST, CERTIFIED REGISTERED

## 2019-01-24 PROCEDURE — 3700000000 HC ANESTHESIA ATTENDED CARE: Performed by: OTOLARYNGOLOGY

## 2019-01-24 PROCEDURE — 2709999900 HC NON-CHARGEABLE SUPPLY: Performed by: OTOLARYNGOLOGY

## 2019-01-24 PROCEDURE — 3600000012 HC SURGERY LEVEL 2 ADDTL 15MIN: Performed by: OTOLARYNGOLOGY

## 2019-01-24 PROCEDURE — 7100000010 HC PHASE II RECOVERY - FIRST 15 MIN: Performed by: OTOLARYNGOLOGY

## 2019-01-24 PROCEDURE — 69436 CREATE EARDRUM OPENING: CPT | Performed by: OTOLARYNGOLOGY

## 2019-01-24 PROCEDURE — 2580000003 HC RX 258: Performed by: ANESTHESIOLOGY

## 2019-01-24 PROCEDURE — 6370000000 HC RX 637 (ALT 250 FOR IP): Performed by: OTOLARYNGOLOGY

## 2019-01-24 PROCEDURE — 2780000010 HC IMPLANT OTHER: Performed by: OTOLARYNGOLOGY

## 2019-01-24 PROCEDURE — 3600000002 HC SURGERY LEVEL 2 BASE: Performed by: OTOLARYNGOLOGY

## 2019-01-24 PROCEDURE — 2500000003 HC RX 250 WO HCPCS: Performed by: NURSE ANESTHETIST, CERTIFIED REGISTERED

## 2019-01-24 PROCEDURE — 7100000011 HC PHASE II RECOVERY - ADDTL 15 MIN: Performed by: OTOLARYNGOLOGY

## 2019-01-24 PROCEDURE — 3700000001 HC ADD 15 MINUTES (ANESTHESIA): Performed by: OTOLARYNGOLOGY

## 2019-01-24 PROCEDURE — 69799 UNLISTED PX MIDDLE EAR: CPT | Performed by: OTOLARYNGOLOGY

## 2019-01-24 PROCEDURE — 6370000000 HC RX 637 (ALT 250 FOR IP): Performed by: ANESTHESIOLOGY

## 2019-01-24 PROCEDURE — 7100000001 HC PACU RECOVERY - ADDTL 15 MIN: Performed by: OTOLARYNGOLOGY

## 2019-01-24 DEVICE — IMPLANTABLE DEVICE: Type: IMPLANTABLE DEVICE | Site: EAR | Status: FUNCTIONAL

## 2019-01-24 RX ORDER — CIPROFLOXACIN AND DEXAMETHASONE 3; 1 MG/ML; MG/ML
3 SUSPENSION/ DROPS AURICULAR (OTIC) 3 TIMES DAILY
Qty: 1 BOTTLE | Refills: 3 | Status: SHIPPED | OUTPATIENT
Start: 2019-01-24 | End: 2019-01-31

## 2019-01-24 RX ORDER — SODIUM CHLORIDE, SODIUM LACTATE, POTASSIUM CHLORIDE, CALCIUM CHLORIDE 600; 310; 30; 20 MG/100ML; MG/100ML; MG/100ML; MG/100ML
INJECTION, SOLUTION INTRAVENOUS CONTINUOUS
Status: DISCONTINUED | OUTPATIENT
Start: 2019-01-24 | End: 2019-01-24 | Stop reason: HOSPADM

## 2019-01-24 RX ORDER — FENTANYL CITRATE 50 UG/ML
50 INJECTION, SOLUTION INTRAMUSCULAR; INTRAVENOUS EVERY 5 MIN PRN
Status: DISCONTINUED | OUTPATIENT
Start: 2019-01-24 | End: 2019-01-24 | Stop reason: HOSPADM

## 2019-01-24 RX ORDER — LIDOCAINE HYDROCHLORIDE 20 MG/ML
INJECTION, SOLUTION INFILTRATION; PERINEURAL PRN
Status: DISCONTINUED | OUTPATIENT
Start: 2019-01-24 | End: 2019-01-24 | Stop reason: SDUPTHER

## 2019-01-24 RX ORDER — DIPHENHYDRAMINE HYDROCHLORIDE 50 MG/ML
12.5 INJECTION INTRAMUSCULAR; INTRAVENOUS
Status: DISCONTINUED | OUTPATIENT
Start: 2019-01-24 | End: 2019-01-24 | Stop reason: HOSPADM

## 2019-01-24 RX ORDER — SODIUM CHLORIDE 0.9 % (FLUSH) 0.9 %
10 SYRINGE (ML) INJECTION EVERY 12 HOURS SCHEDULED
Status: DISCONTINUED | OUTPATIENT
Start: 2019-01-24 | End: 2019-01-24 | Stop reason: HOSPADM

## 2019-01-24 RX ORDER — ONDANSETRON 2 MG/ML
INJECTION INTRAMUSCULAR; INTRAVENOUS PRN
Status: DISCONTINUED | OUTPATIENT
Start: 2019-01-24 | End: 2019-01-24 | Stop reason: SDUPTHER

## 2019-01-24 RX ORDER — LABETALOL HYDROCHLORIDE 5 MG/ML
5 INJECTION, SOLUTION INTRAVENOUS EVERY 10 MIN PRN
Status: DISCONTINUED | OUTPATIENT
Start: 2019-01-24 | End: 2019-01-24 | Stop reason: HOSPADM

## 2019-01-24 RX ORDER — FAMOTIDINE 20 MG/1
20 TABLET, FILM COATED ORAL ONCE
Status: COMPLETED | OUTPATIENT
Start: 2019-01-24 | End: 2019-01-24

## 2019-01-24 RX ORDER — HYDRALAZINE HYDROCHLORIDE 20 MG/ML
5 INJECTION INTRAMUSCULAR; INTRAVENOUS EVERY 10 MIN PRN
Status: DISCONTINUED | OUTPATIENT
Start: 2019-01-24 | End: 2019-01-24 | Stop reason: HOSPADM

## 2019-01-24 RX ORDER — METOCLOPRAMIDE 10 MG/1
10 TABLET ORAL ONCE
Status: COMPLETED | OUTPATIENT
Start: 2019-01-24 | End: 2019-01-24

## 2019-01-24 RX ORDER — PROMETHAZINE HYDROCHLORIDE 25 MG/ML
25 INJECTION, SOLUTION INTRAMUSCULAR; INTRAVENOUS
Status: DISCONTINUED | OUTPATIENT
Start: 2019-01-24 | End: 2019-01-24 | Stop reason: HOSPADM

## 2019-01-24 RX ORDER — HYDROCODONE BITARTRATE AND ACETAMINOPHEN 5; 325 MG/1; MG/1
1 TABLET ORAL PRN
Status: DISCONTINUED | OUTPATIENT
Start: 2019-01-24 | End: 2019-01-24 | Stop reason: HOSPADM

## 2019-01-24 RX ORDER — OFLOXACIN 3 MG/ML
SOLUTION/ DROPS OPHTHALMIC PRN
Status: DISCONTINUED | OUTPATIENT
Start: 2019-01-24 | End: 2019-01-24 | Stop reason: HOSPADM

## 2019-01-24 RX ORDER — PROPOFOL 10 MG/ML
INJECTION, EMULSION INTRAVENOUS PRN
Status: DISCONTINUED | OUTPATIENT
Start: 2019-01-24 | End: 2019-01-24 | Stop reason: SDUPTHER

## 2019-01-24 RX ORDER — MIDAZOLAM HYDROCHLORIDE 1 MG/ML
INJECTION INTRAMUSCULAR; INTRAVENOUS PRN
Status: DISCONTINUED | OUTPATIENT
Start: 2019-01-24 | End: 2019-01-24 | Stop reason: SDUPTHER

## 2019-01-24 RX ORDER — FENTANYL CITRATE 50 UG/ML
INJECTION, SOLUTION INTRAMUSCULAR; INTRAVENOUS PRN
Status: DISCONTINUED | OUTPATIENT
Start: 2019-01-24 | End: 2019-01-24 | Stop reason: SDUPTHER

## 2019-01-24 RX ORDER — HYDROCODONE BITARTRATE AND ACETAMINOPHEN 5; 325 MG/1; MG/1
2 TABLET ORAL PRN
Status: DISCONTINUED | OUTPATIENT
Start: 2019-01-24 | End: 2019-01-24 | Stop reason: HOSPADM

## 2019-01-24 RX ORDER — SODIUM CHLORIDE 0.9 % (FLUSH) 0.9 %
10 SYRINGE (ML) INJECTION PRN
Status: DISCONTINUED | OUTPATIENT
Start: 2019-01-24 | End: 2019-01-24 | Stop reason: HOSPADM

## 2019-01-24 RX ORDER — DEXAMETHASONE SODIUM PHOSPHATE 10 MG/ML
INJECTION, SOLUTION INTRAMUSCULAR; INTRAVENOUS PRN
Status: DISCONTINUED | OUTPATIENT
Start: 2019-01-24 | End: 2019-01-24 | Stop reason: SDUPTHER

## 2019-01-24 RX ORDER — MEPERIDINE HYDROCHLORIDE 25 MG/ML
12.5 INJECTION INTRAMUSCULAR; INTRAVENOUS; SUBCUTANEOUS EVERY 5 MIN PRN
Status: DISCONTINUED | OUTPATIENT
Start: 2019-01-24 | End: 2019-01-24 | Stop reason: HOSPADM

## 2019-01-24 RX ORDER — MORPHINE SULFATE 2 MG/ML
1 INJECTION, SOLUTION INTRAMUSCULAR; INTRAVENOUS EVERY 5 MIN PRN
Status: DISCONTINUED | OUTPATIENT
Start: 2019-01-24 | End: 2019-01-24 | Stop reason: HOSPADM

## 2019-01-24 RX ORDER — HYDROMORPHONE HYDROCHLORIDE 1 MG/ML
0.25 INJECTION, SOLUTION INTRAMUSCULAR; INTRAVENOUS; SUBCUTANEOUS EVERY 5 MIN PRN
Status: DISCONTINUED | OUTPATIENT
Start: 2019-01-24 | End: 2019-01-24 | Stop reason: HOSPADM

## 2019-01-24 RX ADMIN — FAMOTIDINE 20 MG: 20 TABLET ORAL at 08:25

## 2019-01-24 RX ADMIN — MIDAZOLAM 2 MG: 1 INJECTION INTRAMUSCULAR; INTRAVENOUS at 10:15

## 2019-01-24 RX ADMIN — FENTANYL CITRATE 50 MCG: 50 INJECTION, SOLUTION INTRAMUSCULAR; INTRAVENOUS at 10:26

## 2019-01-24 RX ADMIN — FENTANYL CITRATE 50 MCG: 50 INJECTION, SOLUTION INTRAMUSCULAR; INTRAVENOUS at 10:22

## 2019-01-24 RX ADMIN — PROPOFOL 200 MG: 10 INJECTION, EMULSION INTRAVENOUS at 10:22

## 2019-01-24 RX ADMIN — SODIUM CHLORIDE, POTASSIUM CHLORIDE, SODIUM LACTATE AND CALCIUM CHLORIDE: 600; 310; 30; 20 INJECTION, SOLUTION INTRAVENOUS at 08:40

## 2019-01-24 RX ADMIN — LIDOCAINE HYDROCHLORIDE 50 MG: 20 INJECTION, SOLUTION INFILTRATION; PERINEURAL at 10:22

## 2019-01-24 RX ADMIN — METOCLOPRAMIDE 10 MG: 10 TABLET ORAL at 08:40

## 2019-01-24 RX ADMIN — DEXAMETHASONE SODIUM PHOSPHATE 10 MG: 10 INJECTION, SOLUTION INTRAMUSCULAR; INTRAVENOUS at 10:31

## 2019-01-24 RX ADMIN — ONDANSETRON HYDROCHLORIDE 4 MG: 2 INJECTION, SOLUTION INTRAMUSCULAR; INTRAVENOUS at 10:31

## 2019-01-24 ASSESSMENT — PULMONARY FUNCTION TESTS
PIF_VALUE: 19
PIF_VALUE: 21
PIF_VALUE: 3
PIF_VALUE: 14
PIF_VALUE: 13
PIF_VALUE: 24
PIF_VALUE: 2
PIF_VALUE: 15
PIF_VALUE: 16
PIF_VALUE: 2
PIF_VALUE: 2
PIF_VALUE: 18
PIF_VALUE: 18
PIF_VALUE: 13
PIF_VALUE: 2
PIF_VALUE: 2
PIF_VALUE: 15
PIF_VALUE: 13
PIF_VALUE: 2
PIF_VALUE: 16
PIF_VALUE: 3
PIF_VALUE: 2
PIF_VALUE: 17
PIF_VALUE: 2
PIF_VALUE: 14
PIF_VALUE: 18
PIF_VALUE: 14
PIF_VALUE: 20
PIF_VALUE: 1

## 2019-01-24 ASSESSMENT — PAIN SCALES - GENERAL
PAINLEVEL_OUTOF10: 0

## 2019-01-24 ASSESSMENT — PAIN - FUNCTIONAL ASSESSMENT: PAIN_FUNCTIONAL_ASSESSMENT: 0-10

## 2019-02-04 ENCOUNTER — OFFICE VISIT (OUTPATIENT)
Dept: ENT CLINIC | Age: 69
End: 2019-02-04

## 2019-02-04 VITALS
OXYGEN SATURATION: 94 % | BODY MASS INDEX: 35.21 KG/M2 | SYSTOLIC BLOOD PRESSURE: 131 MMHG | WEIGHT: 260 LBS | HEIGHT: 72 IN | DIASTOLIC BLOOD PRESSURE: 66 MMHG | HEART RATE: 68 BPM

## 2019-02-04 DIAGNOSIS — H65.491 CHRONIC OTITIS MEDIA OF RIGHT EAR WITH EFFUSION: Primary | ICD-10-CM

## 2019-02-04 PROCEDURE — 99024 POSTOP FOLLOW-UP VISIT: CPT | Performed by: OTOLARYNGOLOGY

## 2019-05-03 RX ORDER — FLUTICASONE PROPIONATE 50 MCG
2 SPRAY, SUSPENSION (ML) NASAL DAILY
Qty: 1 BOTTLE | Refills: 3 | Status: SHIPPED | OUTPATIENT
Start: 2019-05-03 | End: 2019-05-21 | Stop reason: SDUPTHER

## 2019-05-03 NOTE — TELEPHONE ENCOUNTER
Received fax from [facility] asking for a refill on patient's Flonase. Last Appointment was on 1/7/2019 and patient's Next Appointment is 5/21/2019.  Medication is pended

## 2019-05-17 ENCOUNTER — HOSPITAL ENCOUNTER (OUTPATIENT)
Age: 69
Discharge: HOME | End: 2019-05-19
Payer: MEDICARE

## 2019-05-17 LAB
ALBUMIN SERPL-MCNC: 4.3 G/DL (ref 3.5–5.2)
ALP BLD-CCNC: 53 U/L (ref 40–129)
ALT SERPL-CCNC: 13 U/L (ref 0–40)
ANION GAP SERPL CALCULATED.3IONS-SCNC: 15 MMOL/L (ref 7–16)
AST SERPL-CCNC: 23 U/L (ref 0–39)
BASOPHILS ABSOLUTE: 0.12 E9/L (ref 0–0.2)
BASOPHILS RELATIVE PERCENT: 1.5 % (ref 0–2)
BILIRUB SERPL-MCNC: 0.6 MG/DL (ref 0–1.2)
BUN BLDV-MCNC: 16 MG/DL (ref 8–23)
CALCIUM SERPL-MCNC: 9.3 MG/DL (ref 8.6–10.2)
CHLORIDE BLD-SCNC: 104 MMOL/L (ref 98–107)
CHOLESTEROL, TOTAL: 166 MG/DL (ref 0–199)
CO2: 23 MMOL/L (ref 22–29)
CREAT SERPL-MCNC: 1 MG/DL (ref 0.7–1.2)
CREATININE URINE: 119 MG/DL (ref 40–278)
EOSINOPHILS ABSOLUTE: 0.19 E9/L (ref 0.05–0.5)
EOSINOPHILS RELATIVE PERCENT: 2.3 % (ref 0–6)
GFR AFRICAN AMERICAN: >60
GFR NON-AFRICAN AMERICAN: >60 ML/MIN/1.73
GLUCOSE BLD-MCNC: 108 MG/DL (ref 74–99)
HBA1C MFR BLD: 5.7 % (ref 4–5.6)
HCT VFR BLD CALC: 42.4 % (ref 37–54)
HDLC SERPL-MCNC: 36 MG/DL
HEMOGLOBIN: 14.1 G/DL (ref 12.5–16.5)
IMMATURE GRANULOCYTES #: 0.05 E9/L
IMMATURE GRANULOCYTES %: 0.6 % (ref 0–5)
LDL CHOLESTEROL CALCULATED: 104 MG/DL (ref 0–99)
LYMPHOCYTES ABSOLUTE: 1.86 E9/L (ref 1.5–4)
LYMPHOCYTES RELATIVE PERCENT: 22.7 % (ref 20–42)
MCH RBC QN AUTO: 30.6 PG (ref 26–35)
MCHC RBC AUTO-ENTMCNC: 33.3 % (ref 32–34.5)
MCV RBC AUTO: 92 FL (ref 80–99.9)
MICROALBUMIN UR-MCNC: <12 MG/L
MICROALBUMIN/CREAT UR-RTO: ABNORMAL (ref 0–30)
MONOCYTES ABSOLUTE: 0.6 E9/L (ref 0.1–0.95)
MONOCYTES RELATIVE PERCENT: 7.3 % (ref 2–12)
NEUTROPHILS ABSOLUTE: 5.36 E9/L (ref 1.8–7.3)
NEUTROPHILS RELATIVE PERCENT: 65.6 % (ref 43–80)
PDW BLD-RTO: 13.6 FL (ref 11.5–15)
PLATELET # BLD: 266 E9/L (ref 130–450)
PMV BLD AUTO: 10.7 FL (ref 7–12)
POTASSIUM SERPL-SCNC: 4.2 MMOL/L (ref 3.5–5)
PROSTATE SPECIFIC ANTIGEN: 3.57 NG/ML (ref 0–4)
RBC # BLD: 4.61 E12/L (ref 3.8–5.8)
SODIUM BLD-SCNC: 142 MMOL/L (ref 132–146)
TOTAL CK: 197 U/L (ref 20–200)
TOTAL PROTEIN: 7 G/DL (ref 6.4–8.3)
TRIGL SERPL-MCNC: 130 MG/DL (ref 0–149)
URIC ACID, SERUM: 6.3 MG/DL (ref 3.4–7)
VITAMIN D 25-HYDROXY: 23 NG/ML (ref 30–100)
VLDLC SERPL CALC-MCNC: 26 MG/DL
WBC # BLD: 8.2 E9/L (ref 4.5–11.5)

## 2019-05-17 PROCEDURE — 80061 LIPID PANEL: CPT

## 2019-05-17 PROCEDURE — 80053 COMPREHEN METABOLIC PANEL: CPT

## 2019-05-17 PROCEDURE — 85025 COMPLETE CBC W/AUTO DIFF WBC: CPT

## 2019-05-17 PROCEDURE — 82306 VITAMIN D 25 HYDROXY: CPT

## 2019-05-17 PROCEDURE — 36415 COLL VENOUS BLD VENIPUNCTURE: CPT

## 2019-05-17 PROCEDURE — G0103 PSA SCREENING: HCPCS

## 2019-05-17 PROCEDURE — 82044 UR ALBUMIN SEMIQUANTITATIVE: CPT

## 2019-05-17 PROCEDURE — 84550 ASSAY OF BLOOD/URIC ACID: CPT

## 2019-05-17 PROCEDURE — 82570 ASSAY OF URINE CREATININE: CPT

## 2019-05-17 PROCEDURE — 82550 ASSAY OF CK (CPK): CPT

## 2019-05-17 PROCEDURE — 83036 HEMOGLOBIN GLYCOSYLATED A1C: CPT

## 2019-05-21 ENCOUNTER — PROCEDURE VISIT (OUTPATIENT)
Dept: AUDIOLOGY | Age: 69
End: 2019-05-21
Payer: MEDICARE

## 2019-05-21 ENCOUNTER — OFFICE VISIT (OUTPATIENT)
Dept: ENT CLINIC | Age: 69
End: 2019-05-21
Payer: MEDICARE

## 2019-05-21 VITALS
HEART RATE: 58 BPM | WEIGHT: 259.7 LBS | BODY MASS INDEX: 35.18 KG/M2 | HEIGHT: 72 IN | SYSTOLIC BLOOD PRESSURE: 157 MMHG | DIASTOLIC BLOOD PRESSURE: 79 MMHG

## 2019-05-21 DIAGNOSIS — H69.83 ETD (EUSTACHIAN TUBE DYSFUNCTION), BILATERAL: Primary | ICD-10-CM

## 2019-05-21 DIAGNOSIS — H69.83 CHRONIC EUSTACHIAN TUBE DYSFUNCTION, BILATERAL: Primary | ICD-10-CM

## 2019-05-21 PROCEDURE — G8417 CALC BMI ABV UP PARAM F/U: HCPCS | Performed by: OTOLARYNGOLOGY

## 2019-05-21 PROCEDURE — 1123F ACP DISCUSS/DSCN MKR DOCD: CPT | Performed by: OTOLARYNGOLOGY

## 2019-05-21 PROCEDURE — 99213 OFFICE O/P EST LOW 20 MIN: CPT | Performed by: OTOLARYNGOLOGY

## 2019-05-21 PROCEDURE — 92567 TYMPANOMETRY: CPT | Performed by: AUDIOLOGIST

## 2019-05-21 PROCEDURE — 4040F PNEUMOC VAC/ADMIN/RCVD: CPT | Performed by: OTOLARYNGOLOGY

## 2019-05-21 PROCEDURE — G8427 DOCREV CUR MEDS BY ELIG CLIN: HCPCS | Performed by: OTOLARYNGOLOGY

## 2019-05-21 PROCEDURE — 3017F COLORECTAL CA SCREEN DOC REV: CPT | Performed by: OTOLARYNGOLOGY

## 2019-05-21 PROCEDURE — 1036F TOBACCO NON-USER: CPT | Performed by: OTOLARYNGOLOGY

## 2019-05-21 RX ORDER — FLUTICASONE PROPIONATE 50 MCG
2 SPRAY, SUSPENSION (ML) NASAL DAILY
Qty: 1 BOTTLE | Refills: 3 | Status: SHIPPED | OUTPATIENT
Start: 2019-05-21 | End: 2019-09-19 | Stop reason: SDUPTHER

## 2019-05-21 NOTE — PROGRESS NOTES
Tympanometry was attempted several times and a seal could not be obtained, bilaterally. Test results are indicative of patent PE tubes, bilaterally.   NOTE:  PE tube not visualized, left ear, during otoscopic exam.

## 2019-05-21 NOTE — PROGRESS NOTES
[facility] Otolaryngology  Dr. Chip Washington. Jada Mendoza. Ms.Ed        Patient Name:  Raul Franklin  :  1950     CHIEF C/O:    Chief Complaint   Patient presents with    Follow-up     6 mo tubes; no problems       HISTORY OBTAINED FROM:  patient    HISTORY OF PRESENT ILLNESS:       Sandrita Shah is a 77y.o. year old male, here today for follow up of history of chronic otitis media with effusion status post bilateral tympanostomy tubes with recent replacement of the left ear tube. Patient denies any complaints of ear drainage pain hearing loss or vertigo. No current complaints of facial pressure sore throat no complaint of nasal congestion continuing nasal steroids without any history of epistaxis. Past Medical History:   Diagnosis Date    DJD (degenerative joint disease) of knee     Hypertension      Past Surgical History:   Procedure Laterality Date    CERVICAL LAMINECTOMY      JOINT REPLACEMENT      right knee    LUMBAR LAMINECTOMY      MYRINGOTOMY Right 2016    Nasal endoscopy with biopsy,biopsy post nasal pharynx    MYRINGOTOMY Right 2019    with right eustachian tube balloon dilation    MYRINGOTOMY AND TYMPANOSTOMY TUBE PLACEMENT Left 2016    Left myringotomy with T-tube insertion    MYRINGOTOMY AND TYMPANOSTOMY TUBE PLACEMENT Left 03/15/2018    Left myringotomy with tympanostomy tube placement     MYRINGOTOMY AND TYMPANOSTOMY TUBE PLACEMENT Right 2019    RIGHT MYRINGOTOMY TUBE REPLACEMENT. RIGHT EUSTACHIAN TUBE DILATION performed by Hugh Medeiros DO at [address] [facility] Left 03/15/2018    Nasal endoscopy with eustachian tube balloon dilation, left.     [place] OFFICE/OUTPT VISIT,PROCEDURE ONLY Left 3/15/2018    LEFT EUSTACHIAN TUBE BALLOON DILATION AND LEFT MYRINGOTOMY WITH T-TUBE INSERTION performed by Hugh Medeiros DO at [address]      TONSILLECTOMY      TOTAL KNEE ARTHROPLASTY Right 2013    Right TKA  T. MD Dayron       Current Outpatient Medications:     fluticasone (FLONASE) 50 MCG/ACT nasal spray, 2 sprays by Nasal route daily 2 sprays each nostril once daily, Disp: 1 Bottle, Rfl: 3    verapamil (VERELAN PM) 240 MG CR capsule, Take 240 mg by mouth 2 times daily , Disp: , Rfl:     cloNIDine (CATAPRES) 0.1 MG tablet, Take 0.1 mg by mouth nightly. 2 tablets nightly , Disp: , Rfl:     lisinopril-hydrochlorothiazide (PRINZIDE;ZESTORETIC) 20-12.5 MG per tablet, Take 1 tablet by mouth daily , Disp: , Rfl:   Other  Social History     Tobacco Use    Smoking status: Never Smoker    Smokeless tobacco: Never Used   Substance Use Topics    Alcohol use: No     Alcohol/week: 0.0 oz    Drug use: No     No family history on file. Review of Systems   Constitutional: Negative for chills and fever. HENT: Positive for congestion. Negative for dental problem, ear discharge, hearing loss, sinus pressure, sneezing, sore throat, tinnitus and trouble swallowing. Respiratory: Negative for cough and shortness of breath. Cardiovascular: Negative for chest pain and palpitations. Gastrointestinal: Negative for vomiting. Skin: Negative for rash. Allergic/Immunologic: Negative for environmental allergies. Neurological: Negative for dizziness and headaches. Hematological: Does not bruise/bleed easily. All other systems reviewed and are negative. BP (!) 157/79 (Site: Left Upper Arm, Position: Sitting, Cuff Size: Large Adult)   Pulse 58   Ht 6' (1.829 m)   Wt 259 lb 11.2 oz (117.8 kg)   BMI 35.22 kg/m²   Physical Exam   Constitutional: He appears well-developed and well-nourished. HENT:   Head: Normocephalic. Right Ear: External ear and ear canal normal.   Left Ear: External ear and ear canal normal.   Nose: No mucosal edema or rhinorrhea. Right sinus exhibits no maxillary sinus tenderness and no frontal sinus tenderness.  Left sinus exhibits no maxillary sinus tenderness and no frontal sinus tenderness. Mouth/Throat: Oropharynx is clear and moist. Mucous membranes are not dry and not cyanotic. Normal dentition. No uvula swelling or lacerations. No oropharyngeal exudate, posterior oropharyngeal erythema or tonsillar abscesses. Eyes: Pupils are equal, round, and reactive to light. EOM are normal.   Neck: Normal range of motion. No tracheal deviation present. No thyromegaly present. Cardiovascular: Normal rate and intact distal pulses. Pulmonary/Chest: Effort normal. No respiratory distress. Musculoskeletal: Normal range of motion. He exhibits no edema. Lymphadenopathy:     He has no cervical adenopathy. Neurological: He is alert. No cranial nerve deficit. Skin: Skin is warm. No erythema. Nursing note and vitals reviewed. IMPRESSION/PLAN:  Patient with recurrent otitis media middle ear effusions has responded significantly to medical therapy, and surgical interventions without any complications at this time continue follow-up in 3 months water precautions reviewed. Dr. Reola Hatchet D. Bunevich D.O. Ms. Stan Folk.  Otolaryngology Facial Plastic Surgery  :  [address] [place] [address] Otolaryngology/Facial Plastic Surgery Residency  Associate Clinical Professor:  Nathan Jordan, [facility]

## 2019-06-15 ASSESSMENT — ENCOUNTER SYMPTOMS
TROUBLE SWALLOWING: 0
SHORTNESS OF BREATH: 0
SORE THROAT: 0
COUGH: 0
VOMITING: 0
SINUS PRESSURE: 0

## 2019-09-19 ENCOUNTER — PROCEDURE VISIT (OUTPATIENT)
Dept: AUDIOLOGY | Age: 69
End: 2019-09-19
Payer: MEDICARE

## 2019-09-19 ENCOUNTER — OFFICE VISIT (OUTPATIENT)
Dept: ENT CLINIC | Age: 69
End: 2019-09-19
Payer: MEDICARE

## 2019-09-19 VITALS
SYSTOLIC BLOOD PRESSURE: 140 MMHG | HEART RATE: 76 BPM | DIASTOLIC BLOOD PRESSURE: 80 MMHG | WEIGHT: 253 LBS | BODY MASS INDEX: 34.27 KG/M2 | HEIGHT: 72 IN

## 2019-09-19 DIAGNOSIS — H65.31 CHRONIC SECRETORY OTITIS MEDIA OF RIGHT EAR: ICD-10-CM

## 2019-09-19 DIAGNOSIS — Z96.22 HISTORY OF TYMPANOSTOMY TUBE PLACEMENT: ICD-10-CM

## 2019-09-19 DIAGNOSIS — H69.82 ETD (EUSTACHIAN TUBE DYSFUNCTION), LEFT: ICD-10-CM

## 2019-09-19 DIAGNOSIS — H61.22 EXCESSIVE CERUMEN IN EAR CANAL, LEFT: Primary | ICD-10-CM

## 2019-09-19 DIAGNOSIS — H69.81 ETD (EUSTACHIAN TUBE DYSFUNCTION), RIGHT: ICD-10-CM

## 2019-09-19 DIAGNOSIS — H65.491 COME (CHRONIC OTITIS MEDIA WITH EFFUSION), RIGHT: ICD-10-CM

## 2019-09-19 DIAGNOSIS — H69.83 ETD (EUSTACHIAN TUBE DYSFUNCTION), BILATERAL: ICD-10-CM

## 2019-09-19 PROCEDURE — 69210 REMOVE IMPACTED EAR WAX UNI: CPT | Performed by: PHYSICIAN ASSISTANT

## 2019-09-19 PROCEDURE — 99213 OFFICE O/P EST LOW 20 MIN: CPT | Performed by: PHYSICIAN ASSISTANT

## 2019-09-19 PROCEDURE — G8427 DOCREV CUR MEDS BY ELIG CLIN: HCPCS | Performed by: PHYSICIAN ASSISTANT

## 2019-09-19 PROCEDURE — 1123F ACP DISCUSS/DSCN MKR DOCD: CPT | Performed by: PHYSICIAN ASSISTANT

## 2019-09-19 PROCEDURE — 4040F PNEUMOC VAC/ADMIN/RCVD: CPT | Performed by: PHYSICIAN ASSISTANT

## 2019-09-19 PROCEDURE — 3017F COLORECTAL CA SCREEN DOC REV: CPT | Performed by: PHYSICIAN ASSISTANT

## 2019-09-19 PROCEDURE — 1036F TOBACCO NON-USER: CPT | Performed by: PHYSICIAN ASSISTANT

## 2019-09-19 PROCEDURE — 92567 TYMPANOMETRY: CPT | Performed by: AUDIOLOGIST

## 2019-09-19 PROCEDURE — G8417 CALC BMI ABV UP PARAM F/U: HCPCS | Performed by: PHYSICIAN ASSISTANT

## 2019-09-19 RX ORDER — FLUTICASONE PROPIONATE 50 MCG
2 SPRAY, SUSPENSION (ML) NASAL DAILY
Qty: 1 BOTTLE | Refills: 4 | Status: SHIPPED | OUTPATIENT
Start: 2019-09-19 | End: 2020-05-27

## 2019-09-19 ASSESSMENT — ENCOUNTER SYMPTOMS
COUGH: 0
SINUS PRESSURE: 0
SHORTNESS OF BREATH: 0
VOMITING: 0
SORE THROAT: 0
TROUBLE SWALLOWING: 0
ALLERGIC/IMMUNOLOGIC NEGATIVE: 1
RESPIRATORY NEGATIVE: 1

## 2019-09-19 NOTE — PROGRESS NOTES
Cerumen on the edge of the canal removed under microscopic guidance. Bilateral T tubes in place and patent. Eyes: Pupils are equal, round, and reactive to light. EOM are normal.   Neck: Normal range of motion. No tracheal deviation present. Cardiovascular: Normal rate and intact distal pulses. Pulmonary/Chest: Effort normal. No respiratory distress. Musculoskeletal: Normal range of motion. He exhibits no edema. Lymphadenopathy:     He has no cervical adenopathy. Neurological: He is alert. No cranial nerve deficit. Skin: Skin is warm. No erythema. Nursing note and vitals reviewed. Cerumen removal     Auditory canal(s) left ear partially obstructed with cerumen. A microscope was used . Cerumen was gently removed using suction and loop. Tympanic membranes are intact following the procedure. Auditory canals appear normal.     tymps  No seals could be obtained indicating patent tubes bilaterally      Diagnosis Orders   1. Excessive cerumen in ear canal, left  AR REMOVAL IMPACTED CERUMEN INSTRUMENTATION UNILAT   2. ETD (Eustachian tube dysfunction), bilateral  Tympanometry   3. History of tympanostomy tube placement       IMPRESSION/PLAN:  Cerumen removed from left ear. Bilateral PE tubes in TMs and patent. Patient advised to continue flonase daily to help with congestion- refills sent. Follow up in 4 months for recheck. If any new or worsening symptoms call the office to be seen sooner, or report to the emergency department, if needed. This note was done using voice recognition software. There may be accidental errors in grammar or spelling.    GRACE Dickerson

## 2019-11-08 ENCOUNTER — OFFICE VISIT (OUTPATIENT)
Dept: FAMILY MEDICINE CLINIC | Age: 69
End: 2019-11-08
Payer: MEDICARE

## 2019-11-08 ENCOUNTER — TELEPHONE (OUTPATIENT)
Dept: FAMILY MEDICINE CLINIC | Age: 69
End: 2019-11-08

## 2019-11-08 ENCOUNTER — HOSPITAL ENCOUNTER (OUTPATIENT)
Age: 69
Discharge: HOME | End: 2019-11-10
Payer: MEDICARE

## 2019-11-08 VITALS
SYSTOLIC BLOOD PRESSURE: 132 MMHG | HEIGHT: 72 IN | TEMPERATURE: 97.8 F | OXYGEN SATURATION: 96 % | BODY MASS INDEX: 34.81 KG/M2 | DIASTOLIC BLOOD PRESSURE: 80 MMHG | WEIGHT: 257 LBS | HEART RATE: 68 BPM

## 2019-11-08 VITALS
OXYGEN SATURATION: 96 % | HEART RATE: 68 BPM | HEIGHT: 72 IN | DIASTOLIC BLOOD PRESSURE: 80 MMHG | SYSTOLIC BLOOD PRESSURE: 132 MMHG | TEMPERATURE: 97.8 F | BODY MASS INDEX: 34.81 KG/M2 | WEIGHT: 257 LBS

## 2019-11-08 DIAGNOSIS — Z00.00 ROUTINE GENERAL MEDICAL EXAMINATION AT A HEALTH CARE FACILITY: Primary | ICD-10-CM

## 2019-11-08 DIAGNOSIS — Z12.5 ENCOUNTER FOR SCREENING FOR MALIGNANT NEOPLASM OF PROSTATE: ICD-10-CM

## 2019-11-08 DIAGNOSIS — I10 ESSENTIAL HYPERTENSION: Primary | ICD-10-CM

## 2019-11-08 DIAGNOSIS — R73.01 IMPAIRED FASTING GLUCOSE: ICD-10-CM

## 2019-11-08 DIAGNOSIS — E55.9 VITAMIN D DEFICIENCY: ICD-10-CM

## 2019-11-08 DIAGNOSIS — E78.5 HYPERLIPIDEMIA, UNSPECIFIED HYPERLIPIDEMIA TYPE: ICD-10-CM

## 2019-11-08 DIAGNOSIS — E03.9 HYPOTHYROIDISM, UNSPECIFIED TYPE: ICD-10-CM

## 2019-11-08 DIAGNOSIS — I10 ESSENTIAL HYPERTENSION: ICD-10-CM

## 2019-11-08 LAB
ALBUMIN SERPL-MCNC: 4.4 G/DL (ref 3.5–5.2)
ALP BLD-CCNC: 48 U/L (ref 40–129)
ALT SERPL-CCNC: 13 U/L (ref 0–40)
ANION GAP SERPL CALCULATED.3IONS-SCNC: 15 MMOL/L (ref 7–16)
AST SERPL-CCNC: 16 U/L (ref 0–39)
BASOPHILS ABSOLUTE: 0.11 E9/L (ref 0–0.2)
BASOPHILS RELATIVE PERCENT: 1.2 % (ref 0–2)
BILIRUB SERPL-MCNC: 0.5 MG/DL (ref 0–1.2)
BUN BLDV-MCNC: 15 MG/DL (ref 8–23)
CALCIUM SERPL-MCNC: 9.2 MG/DL (ref 8.6–10.2)
CHLORIDE BLD-SCNC: 103 MMOL/L (ref 98–107)
CHOLESTEROL, TOTAL: 166 MG/DL (ref 0–199)
CO2: 21 MMOL/L (ref 22–29)
CREAT SERPL-MCNC: 1 MG/DL (ref 0.7–1.2)
CREATININE URINE: 60 MG/DL (ref 40–278)
EOSINOPHILS ABSOLUTE: 0.32 E9/L (ref 0.05–0.5)
EOSINOPHILS RELATIVE PERCENT: 3.4 % (ref 0–6)
GFR AFRICAN AMERICAN: >60
GFR NON-AFRICAN AMERICAN: >60 ML/MIN/1.73
GLUCOSE BLD-MCNC: 111 MG/DL (ref 74–99)
HBA1C MFR BLD: 5.8 % (ref 4–5.6)
HCT VFR BLD CALC: 44.8 % (ref 37–54)
HDLC SERPL-MCNC: 40 MG/DL
HEMOGLOBIN: 14.4 G/DL (ref 12.5–16.5)
IMMATURE GRANULOCYTES #: 0.06 E9/L
IMMATURE GRANULOCYTES %: 0.6 % (ref 0–5)
LDL CHOLESTEROL CALCULATED: 101 MG/DL (ref 0–99)
LYMPHOCYTES ABSOLUTE: 1.61 E9/L (ref 1.5–4)
LYMPHOCYTES RELATIVE PERCENT: 17.2 % (ref 20–42)
MCH RBC QN AUTO: 29.6 PG (ref 26–35)
MCHC RBC AUTO-ENTMCNC: 32.1 % (ref 32–34.5)
MCV RBC AUTO: 92.2 FL (ref 80–99.9)
MICROALBUMIN UR-MCNC: <12 MG/L
MICROALBUMIN/CREAT UR-RTO: ABNORMAL (ref 0–30)
MONOCYTES ABSOLUTE: 0.61 E9/L (ref 0.1–0.95)
MONOCYTES RELATIVE PERCENT: 6.5 % (ref 2–12)
NEUTROPHILS ABSOLUTE: 6.63 E9/L (ref 1.8–7.3)
NEUTROPHILS RELATIVE PERCENT: 71.1 % (ref 43–80)
PDW BLD-RTO: 13.9 FL (ref 11.5–15)
PLATELET # BLD: 246 E9/L (ref 130–450)
PMV BLD AUTO: 10.8 FL (ref 7–12)
POTASSIUM SERPL-SCNC: 4.2 MMOL/L (ref 3.5–5)
PROSTATE SPECIFIC ANTIGEN: 3.79 NG/ML (ref 0–4)
RBC # BLD: 4.86 E12/L (ref 3.8–5.8)
SODIUM BLD-SCNC: 139 MMOL/L (ref 132–146)
T4 FREE: 1.04 NG/DL (ref 0.93–1.7)
TOTAL CK: 90 U/L (ref 20–200)
TOTAL PROTEIN: 7.1 G/DL (ref 6.4–8.3)
TRIGL SERPL-MCNC: 126 MG/DL (ref 0–149)
TSH SERPL DL<=0.05 MIU/L-ACNC: 5.27 UIU/ML (ref 0.27–4.2)
URIC ACID, SERUM: 5.3 MG/DL (ref 3.4–7)
VITAMIN D 25-HYDROXY: 20 NG/ML (ref 30–100)
VLDLC SERPL CALC-MCNC: 25 MG/DL
WBC # BLD: 9.3 E9/L (ref 4.5–11.5)

## 2019-11-08 PROCEDURE — 99214 OFFICE O/P EST MOD 30 MIN: CPT | Performed by: FAMILY MEDICINE

## 2019-11-08 PROCEDURE — 36415 COLL VENOUS BLD VENIPUNCTURE: CPT

## 2019-11-08 PROCEDURE — G8484 FLU IMMUNIZE NO ADMIN: HCPCS | Performed by: PHYSICIAN ASSISTANT

## 2019-11-08 PROCEDURE — 4040F PNEUMOC VAC/ADMIN/RCVD: CPT | Performed by: FAMILY MEDICINE

## 2019-11-08 PROCEDURE — G0103 PSA SCREENING: HCPCS

## 2019-11-08 PROCEDURE — 1036F TOBACCO NON-USER: CPT | Performed by: FAMILY MEDICINE

## 2019-11-08 PROCEDURE — 90653 IIV ADJUVANT VACCINE IM: CPT | Performed by: FAMILY MEDICINE

## 2019-11-08 PROCEDURE — G8482 FLU IMMUNIZE ORDER/ADMIN: HCPCS | Performed by: FAMILY MEDICINE

## 2019-11-08 PROCEDURE — 82550 ASSAY OF CK (CPK): CPT

## 2019-11-08 PROCEDURE — 84443 ASSAY THYROID STIM HORMONE: CPT

## 2019-11-08 PROCEDURE — 1123F ACP DISCUSS/DSCN MKR DOCD: CPT | Performed by: PHYSICIAN ASSISTANT

## 2019-11-08 PROCEDURE — 4040F PNEUMOC VAC/ADMIN/RCVD: CPT | Performed by: PHYSICIAN ASSISTANT

## 2019-11-08 PROCEDURE — 83036 HEMOGLOBIN GLYCOSYLATED A1C: CPT

## 2019-11-08 PROCEDURE — 80053 COMPREHEN METABOLIC PANEL: CPT

## 2019-11-08 PROCEDURE — 82570 ASSAY OF URINE CREATININE: CPT

## 2019-11-08 PROCEDURE — 82306 VITAMIN D 25 HYDROXY: CPT

## 2019-11-08 PROCEDURE — G8417 CALC BMI ABV UP PARAM F/U: HCPCS | Performed by: FAMILY MEDICINE

## 2019-11-08 PROCEDURE — 3017F COLORECTAL CA SCREEN DOC REV: CPT | Performed by: PHYSICIAN ASSISTANT

## 2019-11-08 PROCEDURE — 80061 LIPID PANEL: CPT

## 2019-11-08 PROCEDURE — G8427 DOCREV CUR MEDS BY ELIG CLIN: HCPCS | Performed by: FAMILY MEDICINE

## 2019-11-08 PROCEDURE — 84550 ASSAY OF BLOOD/URIC ACID: CPT

## 2019-11-08 PROCEDURE — 85025 COMPLETE CBC W/AUTO DIFF WBC: CPT

## 2019-11-08 PROCEDURE — 3017F COLORECTAL CA SCREEN DOC REV: CPT | Performed by: FAMILY MEDICINE

## 2019-11-08 PROCEDURE — 82044 UR ALBUMIN SEMIQUANTITATIVE: CPT

## 2019-11-08 PROCEDURE — 84439 ASSAY OF FREE THYROXINE: CPT

## 2019-11-08 PROCEDURE — G0439 PPPS, SUBSEQ VISIT: HCPCS | Performed by: PHYSICIAN ASSISTANT

## 2019-11-08 PROCEDURE — G0008 ADMIN INFLUENZA VIRUS VAC: HCPCS | Performed by: FAMILY MEDICINE

## 2019-11-08 PROCEDURE — 1123F ACP DISCUSS/DSCN MKR DOCD: CPT | Performed by: FAMILY MEDICINE

## 2019-11-08 RX ORDER — VERAPAMIL HYDROCHLORIDE 240 MG/1
240 CAPSULE, EXTENDED RELEASE ORAL NIGHTLY
COMMUNITY
End: 2019-11-08 | Stop reason: SDUPTHER

## 2019-11-08 RX ORDER — CLONIDINE HYDROCHLORIDE 0.1 MG/1
0.2 TABLET ORAL NIGHTLY
Qty: 180 TABLET | Refills: 1 | Status: SHIPPED
Start: 2019-11-08 | End: 2020-04-16 | Stop reason: SDUPTHER

## 2019-11-08 RX ORDER — LISINOPRIL AND HYDROCHLOROTHIAZIDE 20; 12.5 MG/1; MG/1
1 TABLET ORAL DAILY
Qty: 90 TABLET | Refills: 1 | Status: SHIPPED
Start: 2019-11-08 | End: 2020-04-16 | Stop reason: SDUPTHER

## 2019-11-08 RX ORDER — VERAPAMIL HYDROCHLORIDE 240 MG/1
240 CAPSULE, EXTENDED RELEASE ORAL 2 TIMES DAILY
Qty: 180 CAPSULE | Refills: 1 | Status: SHIPPED
Start: 2019-11-08 | End: 2020-04-16 | Stop reason: SDUPTHER

## 2019-11-08 ASSESSMENT — PATIENT HEALTH QUESTIONNAIRE - PHQ9
1. LITTLE INTEREST OR PLEASURE IN DOING THINGS: 0
2. FEELING DOWN, DEPRESSED OR HOPELESS: 0
SUM OF ALL RESPONSES TO PHQ9 QUESTIONS 1 & 2: 0
SUM OF ALL RESPONSES TO PHQ QUESTIONS 1-9: 0
SUM OF ALL RESPONSES TO PHQ QUESTIONS 1-9: 0

## 2019-11-08 ASSESSMENT — ENCOUNTER SYMPTOMS
TROUBLE SWALLOWING: 0
CHEST TIGHTNESS: 0
VOMITING: 0
WHEEZING: 0
SINUS PAIN: 0
EYE DISCHARGE: 0
ALLERGIC/IMMUNOLOGIC NEGATIVE: 1
BACK PAIN: 0
DIARRHEA: 0
COUGH: 0
CONSTIPATION: 0
ABDOMINAL PAIN: 0
EYE PAIN: 0
NAUSEA: 0
SORE THROAT: 0
SHORTNESS OF BREATH: 0
COLOR CHANGE: 0

## 2019-11-08 ASSESSMENT — LIFESTYLE VARIABLES: HOW OFTEN DO YOU HAVE A DRINK CONTAINING ALCOHOL: 0

## 2019-11-14 RX ORDER — LEVOTHYROXINE SODIUM 0.03 MG/1
25 TABLET ORAL DAILY
Qty: 30 TABLET | Refills: 5 | Status: SHIPPED | OUTPATIENT
Start: 2019-11-14 | End: 2020-05-07 | Stop reason: SDUPTHER

## 2019-12-09 ENCOUNTER — TELEPHONE (OUTPATIENT)
Dept: FAMILY MEDICINE CLINIC | Age: 69
End: 2019-12-09

## 2019-12-13 ENCOUNTER — OFFICE VISIT (OUTPATIENT)
Dept: FAMILY MEDICINE CLINIC | Age: 69
End: 2019-12-13
Payer: MEDICARE

## 2019-12-13 VITALS
HEART RATE: 62 BPM | TEMPERATURE: 97.7 F | OXYGEN SATURATION: 98 % | DIASTOLIC BLOOD PRESSURE: 88 MMHG | HEIGHT: 72 IN | WEIGHT: 253.2 LBS | BODY MASS INDEX: 34.29 KG/M2 | SYSTOLIC BLOOD PRESSURE: 140 MMHG

## 2019-12-13 DIAGNOSIS — E03.9 HYPOTHYROIDISM, UNSPECIFIED TYPE: Primary | ICD-10-CM

## 2019-12-13 DIAGNOSIS — I10 ESSENTIAL HYPERTENSION: ICD-10-CM

## 2019-12-13 PROCEDURE — 1123F ACP DISCUSS/DSCN MKR DOCD: CPT | Performed by: FAMILY MEDICINE

## 2019-12-13 PROCEDURE — 99213 OFFICE O/P EST LOW 20 MIN: CPT | Performed by: FAMILY MEDICINE

## 2019-12-13 PROCEDURE — 4040F PNEUMOC VAC/ADMIN/RCVD: CPT | Performed by: FAMILY MEDICINE

## 2019-12-13 PROCEDURE — G8427 DOCREV CUR MEDS BY ELIG CLIN: HCPCS | Performed by: FAMILY MEDICINE

## 2019-12-13 PROCEDURE — G8417 CALC BMI ABV UP PARAM F/U: HCPCS | Performed by: FAMILY MEDICINE

## 2019-12-13 PROCEDURE — 3017F COLORECTAL CA SCREEN DOC REV: CPT | Performed by: FAMILY MEDICINE

## 2019-12-13 PROCEDURE — 1036F TOBACCO NON-USER: CPT | Performed by: FAMILY MEDICINE

## 2019-12-13 PROCEDURE — G8482 FLU IMMUNIZE ORDER/ADMIN: HCPCS | Performed by: FAMILY MEDICINE

## 2019-12-13 ASSESSMENT — ENCOUNTER SYMPTOMS
ALLERGIC/IMMUNOLOGIC NEGATIVE: 1
TROUBLE SWALLOWING: 0
BACK PAIN: 0
COLOR CHANGE: 0
ABDOMINAL PAIN: 0
COUGH: 0
SORE THROAT: 0
CONSTIPATION: 0
NAUSEA: 1
DIARRHEA: 0
EYE PAIN: 0
SINUS PAIN: 0
SHORTNESS OF BREATH: 0
EYE DISCHARGE: 0
WHEEZING: 0
CHEST TIGHTNESS: 0
VOMITING: 0

## 2019-12-16 ENCOUNTER — HOSPITAL ENCOUNTER (OUTPATIENT)
Age: 69
Discharge: HOME | End: 2019-12-18
Payer: MEDICARE

## 2019-12-16 DIAGNOSIS — E03.9 HYPOTHYROIDISM, UNSPECIFIED TYPE: ICD-10-CM

## 2019-12-16 LAB
T4 FREE: 1.15 NG/DL (ref 0.93–1.7)
TSH SERPL DL<=0.05 MIU/L-ACNC: 2.98 UIU/ML (ref 0.27–4.2)

## 2019-12-16 PROCEDURE — 84443 ASSAY THYROID STIM HORMONE: CPT

## 2019-12-16 PROCEDURE — 84439 ASSAY OF FREE THYROXINE: CPT

## 2019-12-16 PROCEDURE — 36415 COLL VENOUS BLD VENIPUNCTURE: CPT

## 2020-01-24 ENCOUNTER — PROCEDURE VISIT (OUTPATIENT)
Dept: AUDIOLOGY | Age: 70
End: 2020-01-24
Payer: MEDICARE

## 2020-01-24 ENCOUNTER — OFFICE VISIT (OUTPATIENT)
Dept: ENT CLINIC | Age: 70
End: 2020-01-24
Payer: MEDICARE

## 2020-01-24 VITALS
WEIGHT: 257 LBS | DIASTOLIC BLOOD PRESSURE: 84 MMHG | SYSTOLIC BLOOD PRESSURE: 140 MMHG | BODY MASS INDEX: 34.81 KG/M2 | HEART RATE: 74 BPM | HEIGHT: 72 IN

## 2020-01-24 PROCEDURE — 69210 REMOVE IMPACTED EAR WAX UNI: CPT | Performed by: OTOLARYNGOLOGY

## 2020-01-24 PROCEDURE — G8482 FLU IMMUNIZE ORDER/ADMIN: HCPCS | Performed by: OTOLARYNGOLOGY

## 2020-01-24 PROCEDURE — 92567 TYMPANOMETRY: CPT | Performed by: AUDIOLOGIST

## 2020-01-24 PROCEDURE — 3017F COLORECTAL CA SCREEN DOC REV: CPT | Performed by: OTOLARYNGOLOGY

## 2020-01-24 PROCEDURE — G8417 CALC BMI ABV UP PARAM F/U: HCPCS | Performed by: OTOLARYNGOLOGY

## 2020-01-24 PROCEDURE — 1036F TOBACCO NON-USER: CPT | Performed by: OTOLARYNGOLOGY

## 2020-01-24 PROCEDURE — 99213 OFFICE O/P EST LOW 20 MIN: CPT | Performed by: OTOLARYNGOLOGY

## 2020-01-24 PROCEDURE — 4040F PNEUMOC VAC/ADMIN/RCVD: CPT | Performed by: OTOLARYNGOLOGY

## 2020-01-24 PROCEDURE — G8427 DOCREV CUR MEDS BY ELIG CLIN: HCPCS | Performed by: OTOLARYNGOLOGY

## 2020-01-24 PROCEDURE — 1123F ACP DISCUSS/DSCN MKR DOCD: CPT | Performed by: OTOLARYNGOLOGY

## 2020-01-24 RX ORDER — AZELASTINE 1 MG/ML
2 SPRAY, METERED NASAL DAILY
Qty: 4 BOTTLE | Refills: 3 | Status: SHIPPED
Start: 2020-01-24 | End: 2020-07-06

## 2020-01-24 ASSESSMENT — ENCOUNTER SYMPTOMS
SINUS PRESSURE: 0
SORE THROAT: 0
RESPIRATORY NEGATIVE: 1
COUGH: 0
ALLERGIC/IMMUNOLOGIC NEGATIVE: 1
SHORTNESS OF BREATH: 0
TROUBLE SWALLOWING: 0
VOMITING: 0

## 2020-01-24 NOTE — PROGRESS NOTES
TOTAL KNEE ARTHROPLASTY Right 01/07/2013    Right TKA  JEWELL Padgett MD       Current Outpatient Medications:     azelastine (ASTELIN) 0.1 % nasal spray, 2 sprays by Nasal route daily Use in each nostril as directed, Disp: 4 Bottle, Rfl: 3    levothyroxine (SYNTHROID) 25 MCG tablet, Take 1 tablet by mouth Daily, Disp: 30 tablet, Rfl: 5    verapamil (VERELAN) 240 MG extended release capsule, Take 1 capsule by mouth 2 times daily, Disp: 180 capsule, Rfl: 1    lisinopril-hydrochlorothiazide (PRINZIDE;ZESTORETIC) 20-12.5 MG per tablet, Take 1 tablet by mouth daily, Disp: 90 tablet, Rfl: 1    cloNIDine (CATAPRES) 0.1 MG tablet, Take 2 tablets by mouth nightly 2 tablets nightly, Disp: 180 tablet, Rfl: 1    fluticasone (FLONASE) 50 MCG/ACT nasal spray, 2 sprays by Each Nostril route daily, Disp: 1 Bottle, Rfl: 4  Other  Social History     Tobacco Use    Smoking status: Never Smoker    Smokeless tobacco: Never Used   Substance Use Topics    Alcohol use: No     Alcohol/week: 0.0 standard drinks    Drug use: No     Family History   Problem Relation Age of Onset    Stroke Mother     Osteoarthritis Mother     Coronary Art Dis Father     Pacemaker Father     Hypertension Father     No Known Problems Sister        Review of Systems   Constitutional: Negative. Negative for chills and fever. HENT: Positive for tinnitus. Negative for congestion, dental problem, ear discharge, ear pain, hearing loss, sinus pressure, sneezing, sore throat and trouble swallowing. Respiratory: Negative. Negative for cough and shortness of breath. Gastrointestinal: Negative for vomiting. Skin: Negative for rash. Allergic/Immunologic: Negative. Negative for environmental allergies. Neurological: Negative. Negative for dizziness and headaches. All other systems reviewed and are negative.       BP (!) 140/84   Pulse 74   Ht 6' (1.829 m)   Wt 257 lb (116.6 kg)   BMI 34.86 kg/m²   Physical Exam  Vitals signs and nursing

## 2020-02-28 ENCOUNTER — OFFICE VISIT (OUTPATIENT)
Dept: FAMILY MEDICINE CLINIC | Age: 70
End: 2020-02-28
Payer: MEDICARE

## 2020-02-28 VITALS
SYSTOLIC BLOOD PRESSURE: 120 MMHG | BODY MASS INDEX: 34.4 KG/M2 | WEIGHT: 254 LBS | DIASTOLIC BLOOD PRESSURE: 82 MMHG | OXYGEN SATURATION: 97 % | HEART RATE: 69 BPM | HEIGHT: 72 IN | TEMPERATURE: 97.9 F

## 2020-02-28 PROCEDURE — 1036F TOBACCO NON-USER: CPT | Performed by: FAMILY MEDICINE

## 2020-02-28 PROCEDURE — 87804 INFLUENZA ASSAY W/OPTIC: CPT | Performed by: FAMILY MEDICINE

## 2020-02-28 PROCEDURE — 4040F PNEUMOC VAC/ADMIN/RCVD: CPT | Performed by: FAMILY MEDICINE

## 2020-02-28 PROCEDURE — 3017F COLORECTAL CA SCREEN DOC REV: CPT | Performed by: FAMILY MEDICINE

## 2020-02-28 PROCEDURE — G8482 FLU IMMUNIZE ORDER/ADMIN: HCPCS | Performed by: FAMILY MEDICINE

## 2020-02-28 PROCEDURE — G8417 CALC BMI ABV UP PARAM F/U: HCPCS | Performed by: FAMILY MEDICINE

## 2020-02-28 PROCEDURE — 1123F ACP DISCUSS/DSCN MKR DOCD: CPT | Performed by: FAMILY MEDICINE

## 2020-02-28 PROCEDURE — G8427 DOCREV CUR MEDS BY ELIG CLIN: HCPCS | Performed by: FAMILY MEDICINE

## 2020-02-28 PROCEDURE — 87880 STREP A ASSAY W/OPTIC: CPT | Performed by: FAMILY MEDICINE

## 2020-02-28 PROCEDURE — 99213 OFFICE O/P EST LOW 20 MIN: CPT | Performed by: FAMILY MEDICINE

## 2020-02-28 RX ORDER — AMOXICILLIN AND CLAVULANATE POTASSIUM 875; 125 MG/1; MG/1
1 TABLET, FILM COATED ORAL 2 TIMES DAILY
Qty: 20 TABLET | Refills: 0 | Status: SHIPPED | OUTPATIENT
Start: 2020-02-28 | End: 2020-03-09

## 2020-02-28 RX ORDER — PREDNISONE 10 MG/1
10 TABLET ORAL 2 TIMES DAILY
Qty: 20 TABLET | Refills: 0 | Status: SHIPPED | OUTPATIENT
Start: 2020-02-28 | End: 2020-03-09

## 2020-02-28 ASSESSMENT — ENCOUNTER SYMPTOMS
NAUSEA: 0
EYE DISCHARGE: 0
SHORTNESS OF BREATH: 0
WHEEZING: 0
SINUS PRESSURE: 1
SINUS PAIN: 0
ALLERGIC/IMMUNOLOGIC NEGATIVE: 1
VOMITING: 0
COLOR CHANGE: 0
CONSTIPATION: 0
ABDOMINAL PAIN: 0
EYE PAIN: 0
CHEST TIGHTNESS: 0
DIARRHEA: 0
COUGH: 1
BACK PAIN: 0
SORE THROAT: 1
TROUBLE SWALLOWING: 0

## 2020-02-28 ASSESSMENT — PATIENT HEALTH QUESTIONNAIRE - PHQ9
1. LITTLE INTEREST OR PLEASURE IN DOING THINGS: 0
SUM OF ALL RESPONSES TO PHQ QUESTIONS 1-9: 0
2. FEELING DOWN, DEPRESSED OR HOPELESS: 0
SUM OF ALL RESPONSES TO PHQ QUESTIONS 1-9: 0
SUM OF ALL RESPONSES TO PHQ9 QUESTIONS 1 & 2: 0

## 2020-02-28 NOTE — PROGRESS NOTES
20  Te Martin : 1950 Sex: male  Age: 77 y.o. Chief Complaint   Patient presents with    Cough     5-6 weeks    Congestion    Pharyngitis       HPI:  77 y.o. male here with 5 weeks of sinus s/s and coughing. No fevers. Review of Systems   Constitutional: Positive for fatigue. Negative for chills, diaphoresis and fever. HENT: Positive for congestion, sinus pressure and sore throat. Negative for ear pain, sinus pain, sneezing, tinnitus and trouble swallowing. Eyes: Negative for pain, discharge and visual disturbance. Respiratory: Positive for cough. Negative for chest tightness, shortness of breath and wheezing. Cardiovascular: Negative for chest pain and palpitations. Gastrointestinal: Negative for abdominal pain, constipation, diarrhea, nausea and vomiting. Endocrine: Negative for polydipsia and polyuria. Genitourinary: Negative for difficulty urinating, flank pain and frequency. Musculoskeletal: Negative for arthralgias, back pain, joint swelling and myalgias. Skin: Negative for color change and rash. Allergic/Immunologic: Negative. Neurological: Negative for dizziness, tremors, seizures, syncope and light-headedness. Hematological: Negative for adenopathy. Psychiatric/Behavioral: Negative for behavioral problems and hallucinations. The patient is not nervous/anxious.           Current Outpatient Medications:     predniSONE (DELTASONE) 10 MG tablet, Take 1 tablet by mouth 2 times daily for 10 days, Disp: 20 tablet, Rfl: 0    amoxicillin-clavulanate (AUGMENTIN) 875-125 MG per tablet, Take 1 tablet by mouth 2 times daily for 10 days, Disp: 20 tablet, Rfl: 0    azelastine (ASTELIN) 0.1 % nasal spray, 2 sprays by Nasal route daily Use in each nostril as directed, Disp: 4 Bottle, Rfl: 3    levothyroxine (SYNTHROID) 25 MCG tablet, Take 1 tablet by mouth Daily, Disp: 30 tablet, Rfl: 5    verapamil (VERELAN) 240 MG extended release capsule, Take 1 capsule by mouth Father     Hypertension Father     No Known Problems Sister      Social History     Socioeconomic History    Marital status:      Spouse name: Not on file    Number of children: Not on file    Years of education: Not on file    Highest education level: Not on file   Occupational History    Occupation: retired , gasoline loads   Social Needs    Financial resource strain: Not on file    Food insecurity:     Worry: Not on file     Inability: Not on file    Transportation needs:     Medical: Not on file     Non-medical: Not on file   Tobacco Use    Smoking status: Never Smoker    Smokeless tobacco: Never Used   Substance and Sexual Activity    Alcohol use: No     Alcohol/week: 0.0 standard drinks    Drug use: No    Sexual activity: Yes     Partners: Female   Lifestyle    Physical activity:     Days per week: Not on file     Minutes per session: Not on file    Stress: Not on file   Relationships    Social connections:     Talks on phone: Not on file     Gets together: Not on file     Attends Church service: Not on file     Active member of club or organization: Not on file     Attends meetings of clubs or organizations: Not on file     Relationship status: Not on file    Intimate partner violence:     Fear of current or ex partner: Not on file     Emotionally abused: Not on file     Physically abused: Not on file     Forced sexual activity: Not on file   Other Topics Concern    Not on file   Social History Narrative    Not on file       Vitals:    02/28/20 1107   BP: 120/82   Pulse: 69   Temp: 97.9 °F (36.6 °C)   TempSrc: Temporal   SpO2: 97%   Weight: 254 lb (115.2 kg)   Height: 6' (1.829 m)       Physical Exam  Vitals signs reviewed. Constitutional:       Appearance: He is well-developed. HENT:      Head: Normocephalic. Ears:      Comments: Tubes noted AU. Nose: Congestion present.       Mouth/Throat:      Mouth: Mucous membranes are moist.      Pharynx: Posterior oropharyngeal erythema present. No oropharyngeal exudate. Eyes:      Extraocular Movements: Extraocular movements intact. Conjunctiva/sclera: Conjunctivae normal.      Pupils: Pupils are equal, round, and reactive to light. Neck:      Musculoskeletal: Neck supple. Cardiovascular:      Rate and Rhythm: Normal rate and regular rhythm. Heart sounds: Normal heart sounds. No murmur. Pulmonary:      Effort: Pulmonary effort is normal.      Breath sounds: Normal breath sounds. Abdominal:      General: Bowel sounds are normal.      Palpations: Abdomen is soft. Musculoskeletal: Normal range of motion. Lymphadenopathy:      Cervical: No cervical adenopathy. Skin:     General: Skin is warm and dry. Neurological:      Mental Status: He is alert and oriented to person, place, and time. Psychiatric:         Mood and Affect: Mood normal.         POCT Orders   No Active POCT       Assessment and Plan:  Boone Allen was seen today for cough, congestion and pharyngitis. Diagnoses and all orders for this visit:    Cough  -     POCT Influenza A/B    Sore throat  -     POCT rapid strep A    Essential hypertension    Other orders  -     predniSONE (DELTASONE) 10 MG tablet; Take 1 tablet by mouth 2 times daily for 10 days  -     amoxicillin-clavulanate (AUGMENTIN) 875-125 MG per tablet; Take 1 tablet by mouth 2 times daily for 10 days    Reviewed pmhx, meds. Start augmentin/pred. Recheck 10 days prn. Return in about 10 days (around 3/9/2020).      Seen By:  Francis Santizo DO

## 2020-03-04 LAB
INFLUENZA A ANTIBODY: NORMAL
INFLUENZA B ANTIBODY: NORMAL
S PYO AG THROAT QL: NORMAL

## 2020-03-09 ENCOUNTER — OFFICE VISIT (OUTPATIENT)
Dept: FAMILY MEDICINE CLINIC | Age: 70
End: 2020-03-09
Payer: MEDICARE

## 2020-03-09 VITALS
HEIGHT: 72 IN | TEMPERATURE: 97.5 F | WEIGHT: 254 LBS | DIASTOLIC BLOOD PRESSURE: 72 MMHG | HEART RATE: 96 BPM | OXYGEN SATURATION: 61 % | SYSTOLIC BLOOD PRESSURE: 132 MMHG | BODY MASS INDEX: 34.4 KG/M2

## 2020-03-09 PROCEDURE — G8427 DOCREV CUR MEDS BY ELIG CLIN: HCPCS | Performed by: FAMILY MEDICINE

## 2020-03-09 PROCEDURE — 4040F PNEUMOC VAC/ADMIN/RCVD: CPT | Performed by: FAMILY MEDICINE

## 2020-03-09 PROCEDURE — 3017F COLORECTAL CA SCREEN DOC REV: CPT | Performed by: FAMILY MEDICINE

## 2020-03-09 PROCEDURE — 1036F TOBACCO NON-USER: CPT | Performed by: FAMILY MEDICINE

## 2020-03-09 PROCEDURE — 99213 OFFICE O/P EST LOW 20 MIN: CPT | Performed by: FAMILY MEDICINE

## 2020-03-09 PROCEDURE — G8417 CALC BMI ABV UP PARAM F/U: HCPCS | Performed by: FAMILY MEDICINE

## 2020-03-09 PROCEDURE — G8482 FLU IMMUNIZE ORDER/ADMIN: HCPCS | Performed by: FAMILY MEDICINE

## 2020-03-09 PROCEDURE — 1123F ACP DISCUSS/DSCN MKR DOCD: CPT | Performed by: FAMILY MEDICINE

## 2020-03-09 RX ORDER — GUAIFENESIN AND CODEINE PHOSPHATE 100; 10 MG/5ML; MG/5ML
10 SOLUTION ORAL 4 TIMES DAILY PRN
Qty: 280 ML | Refills: 0 | Status: SHIPPED | OUTPATIENT
Start: 2020-03-09 | End: 2020-03-16

## 2020-03-09 RX ORDER — AZITHROMYCIN 250 MG/1
250 TABLET, FILM COATED ORAL SEE ADMIN INSTRUCTIONS
Qty: 6 TABLET | Refills: 0 | Status: SHIPPED | OUTPATIENT
Start: 2020-03-09 | End: 2020-03-14

## 2020-03-09 ASSESSMENT — ENCOUNTER SYMPTOMS
EYE PAIN: 0
CHEST TIGHTNESS: 0
SORE THROAT: 1
CONSTIPATION: 0
COLOR CHANGE: 0
SINUS PRESSURE: 1
EYE DISCHARGE: 0
WHEEZING: 0
ABDOMINAL PAIN: 0
SINUS PAIN: 0
NAUSEA: 0
SHORTNESS OF BREATH: 0
COUGH: 1
ALLERGIC/IMMUNOLOGIC NEGATIVE: 1
DIARRHEA: 0
BACK PAIN: 0
VOMITING: 0
TROUBLE SWALLOWING: 0

## 2020-03-09 NOTE — PROGRESS NOTES
(AUGMENTIN) 875-125 MG per tablet, Take 1 tablet by mouth 2 times daily for 10 days, Disp: 20 tablet, Rfl: 0    azelastine (ASTELIN) 0.1 % nasal spray, 2 sprays by Nasal route daily Use in each nostril as directed, Disp: 4 Bottle, Rfl: 3    levothyroxine (SYNTHROID) 25 MCG tablet, Take 1 tablet by mouth Daily, Disp: 30 tablet, Rfl: 5    verapamil (VERELAN) 240 MG extended release capsule, Take 1 capsule by mouth 2 times daily, Disp: 180 capsule, Rfl: 1    lisinopril-hydrochlorothiazide (PRINZIDE;ZESTORETIC) 20-12.5 MG per tablet, Take 1 tablet by mouth daily, Disp: 90 tablet, Rfl: 1    cloNIDine (CATAPRES) 0.1 MG tablet, Take 2 tablets by mouth nightly 2 tablets nightly, Disp: 180 tablet, Rfl: 1    fluticasone (FLONASE) 50 MCG/ACT nasal spray, 2 sprays by Each Nostril route daily, Disp: 1 Bottle, Rfl: 4  Allergies   Allergen Reactions    Other      SEASONAL       Past Medical History:   Diagnosis Date    BPH (benign prostatic hyperplasia)     Colon polyp 01/2018    Dr. Carmen WEINER (degenerative joint disease) of knee     Hypertension     Spinal stenosis, lumbar      Past Surgical History:   Procedure Laterality Date    CERVICAL LAMINECTOMY  1989    LUMBAR LAMINECTOMY  2010    MYRINGOTOMY Right 01/14/2016    Nasal endoscopy with biopsy,biopsy post nasal pharynx    MYRINGOTOMY Right 01/24/2019    with right eustachian tube balloon dilation    MYRINGOTOMY AND TYMPANOSTOMY TUBE PLACEMENT Left 12/08/2016    Left myringotomy with T-tube insertion    MYRINGOTOMY AND TYMPANOSTOMY TUBE PLACEMENT Left 03/15/2018    Left myringotomy with tympanostomy tube placement     MYRINGOTOMY AND TYMPANOSTOMY TUBE PLACEMENT Right 1/24/2019    RIGHT MYRINGOTOMY TUBE REPLACEMENT. RIGHT EUSTACHIAN TUBE DILATION performed by Chilton Merlin, DO at [address] Left 03/15/2018    Nasal endoscopy with eustachian tube balloon dilation, left.     [place] OFFICE/OUTPT VISIT,PROCEDURE ONLY Left 3/15/2018 LEFT EUSTACHIAN TUBE BALLOON DILATION AND LEFT MYRINGOTOMY WITH T-TUBE INSERTION performed by Erik Redd DO at [facility] 7  2003    x2; hydrocelectomy    TONSILLECTOMY      TOTAL KNEE ARTHROPLASTY Right 01/07/2013    Right TKA  T.  Agapito Calhoun MD     Family History   Problem Relation Age of Onset    Stroke Mother     Osteoarthritis Mother     Coronary Art Dis Father     Pacemaker Father     Hypertension Father     No Known Problems Sister      Social History     Socioeconomic History    Marital status:      Spouse name: Not on file    Number of children: Not on file    Years of education: Not on file    Highest education level: Not on file   Occupational History    Occupation: retired , gasoline loads   Social Needs    Financial resource strain: Not on file    Food insecurity     Worry: Not on file     Inability: Not on file   [employer] needs     Medical: Not on file     Non-medical: Not on file   Tobacco Use    Smoking status: Never Smoker    Smokeless tobacco: Never Used   Substance and Sexual Activity    Alcohol use: No     Alcohol/week: 0.0 standard drinks    Drug use: No    Sexual activity: Yes     Partners: Female   Lifestyle    Physical activity     Days per week: Not on file     Minutes per session: Not on file    Stress: Not on file   Relationships    Social connections     Talks on phone: Not on file     Gets together: Not on file     Attends [employer]an service: Not on file     Active member of club or organization: Not on file     Attends meetings of clubs or organizations: Not on file     Relationship status: Not on file    Intimate partner violence     Fear of current or ex partner: Not on file     Emotionally abused: Not on file     Physically abused: Not on file     Forced sexual activity: Not on file   Other Topics Concern    Not on file   Social History Narrative    Not on file       Vitals:    03/09/20 1441   BP: 132/72 Pulse: 96   Temp: 97.5 °F (36.4 °C)   TempSrc: Temporal   SpO2: (!) 61%   Weight: 254 lb (115.2 kg)   Height: 6' (1.829 m)       Physical Exam  Vitals signs reviewed. Constitutional:       Appearance: He is well-developed. HENT:      Head: Normocephalic. Ears:      Comments: TMs appear normal with tubes noted. Nose: Congestion present. Mouth/Throat:      Mouth: Mucous membranes are moist.      Pharynx: Posterior oropharyngeal erythema present. No oropharyngeal exudate. Eyes:      Extraocular Movements: Extraocular movements intact. Conjunctiva/sclera: Conjunctivae normal.      Pupils: Pupils are equal, round, and reactive to light. Neck:      Musculoskeletal: Neck supple. Vascular: No carotid bruit. Cardiovascular:      Rate and Rhythm: Normal rate and regular rhythm. Heart sounds: Normal heart sounds. No murmur. Pulmonary:      Effort: Pulmonary effort is normal.      Breath sounds: Normal breath sounds. Abdominal:      General: Bowel sounds are normal.      Palpations: Abdomen is soft. Lymphadenopathy:      Cervical: No cervical adenopathy. Skin:     General: Skin is warm and dry. Neurological:      Mental Status: He is alert and oriented to person, place, and time. Psychiatric:         Mood and Affect: Mood normal.         POCT Orders   No Active POCT       Assessment and Plan:  Terrance Torres was seen today for cough. Diagnoses and all orders for this visit:    Cough  -     guaiFENesin-codeine (TUSSI-ORGANIDIN NR) 100-10 MG/5ML syrup; Take 10 mLs by mouth 4 times daily as needed for Cough for up to 7 days. Other orders  -     azithromycin (ZITHROMAX) 250 MG tablet; Take 1 tablet by mouth See Admin Instructions for 5 days 500mg on day 1 followed by 250mg on days 2 - 5  -     dexamethasone (DECADRON) 0.75 MG tablet; Take 1 tablet by mouth three times daily for 5 days    Reviewed pmhx, meds. Go to zpak/decadron/khadijah. Ac. Reviewed Oarrs. Recheck one week.  If not better, will get cxr and refer back to ENT. Pulse ox in error. Patient sitting quietly without sob. Return in about 1 week (around 3/16/2020).      Seen By:  Rich Corrigan, DO

## 2020-04-16 ENCOUNTER — TELEPHONE (OUTPATIENT)
Dept: FAMILY MEDICINE CLINIC | Age: 70
End: 2020-04-16

## 2020-04-16 RX ORDER — CLONIDINE HYDROCHLORIDE 0.1 MG/1
0.2 TABLET ORAL NIGHTLY
Qty: 180 TABLET | Refills: 0 | Status: SHIPPED
Start: 2020-04-16 | End: 2020-08-24

## 2020-04-16 RX ORDER — LISINOPRIL AND HYDROCHLOROTHIAZIDE 20; 12.5 MG/1; MG/1
1 TABLET ORAL DAILY
Qty: 90 TABLET | Refills: 0 | Status: SHIPPED
Start: 2020-04-16 | End: 2020-08-24

## 2020-04-16 RX ORDER — VERAPAMIL HYDROCHLORIDE 240 MG/1
240 CAPSULE, EXTENDED RELEASE ORAL 2 TIMES DAILY
Qty: 180 CAPSULE | Refills: 0 | Status: SHIPPED
Start: 2020-04-16 | End: 2020-06-04 | Stop reason: SDUPTHER

## 2020-04-16 NOTE — TELEPHONE ENCOUNTER
Pt returned call to cancel appt.   Pt resched 6/4; is not comfortable w/vv; requests to get  Refills sent to [facility] [place]

## 2020-05-07 RX ORDER — LEVOTHYROXINE SODIUM 0.03 MG/1
25 TABLET ORAL DAILY
Qty: 30 TABLET | Refills: 5 | Status: SHIPPED
Start: 2020-05-07 | End: 2020-05-08

## 2020-05-08 RX ORDER — LEVOTHYROXINE SODIUM 0.03 MG/1
25 TABLET ORAL DAILY
Qty: 90 TABLET | Refills: 1 | Status: SHIPPED
Start: 2020-05-08 | End: 2020-11-02

## 2020-05-26 ENCOUNTER — OFFICE VISIT (OUTPATIENT)
Dept: ENT CLINIC | Age: 70
End: 2020-05-26
Payer: MEDICARE

## 2020-05-26 ENCOUNTER — PROCEDURE VISIT (OUTPATIENT)
Dept: AUDIOLOGY | Age: 70
End: 2020-05-26
Payer: MEDICARE

## 2020-05-26 VITALS
SYSTOLIC BLOOD PRESSURE: 128 MMHG | WEIGHT: 254.6 LBS | HEIGHT: 72 IN | BODY MASS INDEX: 34.48 KG/M2 | DIASTOLIC BLOOD PRESSURE: 64 MMHG

## 2020-05-26 PROCEDURE — 1123F ACP DISCUSS/DSCN MKR DOCD: CPT | Performed by: OTOLARYNGOLOGY

## 2020-05-26 PROCEDURE — 4040F PNEUMOC VAC/ADMIN/RCVD: CPT | Performed by: OTOLARYNGOLOGY

## 2020-05-26 PROCEDURE — 99213 OFFICE O/P EST LOW 20 MIN: CPT | Performed by: OTOLARYNGOLOGY

## 2020-05-26 PROCEDURE — G8417 CALC BMI ABV UP PARAM F/U: HCPCS | Performed by: OTOLARYNGOLOGY

## 2020-05-26 PROCEDURE — G8427 DOCREV CUR MEDS BY ELIG CLIN: HCPCS | Performed by: OTOLARYNGOLOGY

## 2020-05-26 PROCEDURE — 69210 REMOVE IMPACTED EAR WAX UNI: CPT | Performed by: OTOLARYNGOLOGY

## 2020-05-26 PROCEDURE — 92567 TYMPANOMETRY: CPT | Performed by: AUDIOLOGIST

## 2020-05-26 PROCEDURE — 3017F COLORECTAL CA SCREEN DOC REV: CPT | Performed by: OTOLARYNGOLOGY

## 2020-05-26 PROCEDURE — 1036F TOBACCO NON-USER: CPT | Performed by: OTOLARYNGOLOGY

## 2020-05-26 ASSESSMENT — ENCOUNTER SYMPTOMS
TROUBLE SWALLOWING: 0
VOMITING: 0
RESPIRATORY NEGATIVE: 1
ALLERGIC/IMMUNOLOGIC NEGATIVE: 1
SINUS PRESSURE: 0
SHORTNESS OF BREATH: 0
SORE THROAT: 0
COUGH: 0

## 2020-05-26 NOTE — PROGRESS NOTES
MD       Current Outpatient Medications:     levothyroxine (SYNTHROID) 25 MCG tablet, TAKE 1 TABLET BY MOUTH DAILY, Disp: 90 tablet, Rfl: 1    verapamil (VERELAN) 240 MG extended release capsule, Take 1 capsule by mouth 2 times daily, Disp: 180 capsule, Rfl: 0    cloNIDine (CATAPRES) 0.1 MG tablet, Take 2 tablets by mouth nightly 2 tablets nightly, Disp: 180 tablet, Rfl: 0    lisinopril-hydroCHLOROthiazide (PRINZIDE;ZESTORETIC) 20-12.5 MG per tablet, Take 1 tablet by mouth daily, Disp: 90 tablet, Rfl: 0    azelastine (ASTELIN) 0.1 % nasal spray, 2 sprays by Nasal route daily Use in each nostril as directed, Disp: 4 Bottle, Rfl: 3    fluticasone (FLONASE) 50 MCG/ACT nasal spray, 2 sprays by Each Nostril route daily, Disp: 1 Bottle, Rfl: 4  Other  Social History     Tobacco Use    Smoking status: Never Smoker    Smokeless tobacco: Never Used   Substance Use Topics    Alcohol use: No     Alcohol/week: 0.0 standard drinks    Drug use: No     Family History   Problem Relation Age of Onset    Stroke Mother     Osteoarthritis Mother     Coronary Art Dis Father     Pacemaker Father     Hypertension Father     No Known Problems Sister        Review of Systems   Constitutional: Negative. Negative for chills and fever. HENT: Positive for tinnitus. Negative for congestion, dental problem, ear discharge, ear pain, hearing loss, sinus pressure, sneezing, sore throat and trouble swallowing. Respiratory: Negative. Negative for cough and shortness of breath. Gastrointestinal: Negative for vomiting. Skin: Negative for rash. Allergic/Immunologic: Negative. Negative for environmental allergies. Neurological: Negative. Negative for dizziness and headaches. All other systems reviewed and are negative.       /64 (Site: Left Upper Arm, Position: Sitting, Cuff Size: Large Adult)   Ht 6' (1.829 m)   Wt 254 lb 9.6 oz (115.5 kg)   BMI 34.53 kg/m²   Physical Exam  Vitals signs and nursing note Ciprodex drops on the R ear    Follow up in 4 months for recheck. [address]  1950      I have discussed the case, including pertinent history and exam findings with the resident. I have seen and examined the patient and the key elements of the encounter have been performed by me. I agree with the assessment, plan and orders as documented by the resident. Patient here for follow up of medical problems. Remainder of medical problems as per resident note.       [address] [facility],   6/1/20

## 2020-06-04 RX ORDER — VERAPAMIL HYDROCHLORIDE 240 MG/1
240 CAPSULE, EXTENDED RELEASE ORAL 2 TIMES DAILY
Qty: 180 CAPSULE | Refills: 0 | Status: SHIPPED
Start: 2020-06-04 | End: 2020-08-24

## 2020-07-06 ENCOUNTER — OFFICE VISIT (OUTPATIENT)
Dept: PRIMARY CARE CLINIC | Age: 70
End: 2020-07-06
Payer: MEDICARE

## 2020-07-06 ENCOUNTER — HOSPITAL ENCOUNTER (OUTPATIENT)
Age: 70
Discharge: HOME | End: 2020-07-08
Payer: MEDICARE

## 2020-07-06 VITALS
SYSTOLIC BLOOD PRESSURE: 130 MMHG | WEIGHT: 250 LBS | HEART RATE: 66 BPM | BODY MASS INDEX: 33.86 KG/M2 | TEMPERATURE: 97.6 F | DIASTOLIC BLOOD PRESSURE: 80 MMHG | OXYGEN SATURATION: 95 % | HEIGHT: 72 IN

## 2020-07-06 PROBLEM — M76.62 TENDONITIS, ACHILLES, LEFT: Status: RESOLVED | Noted: 2018-05-22 | Resolved: 2020-07-06

## 2020-07-06 LAB
ALBUMIN SERPL-MCNC: 4.2 G/DL (ref 3.5–5.2)
ALP BLD-CCNC: 50 U/L (ref 40–129)
ALT SERPL-CCNC: 10 U/L (ref 0–40)
ANION GAP SERPL CALCULATED.3IONS-SCNC: 14 MMOL/L (ref 7–16)
AST SERPL-CCNC: 12 U/L (ref 0–39)
BASOPHILS ABSOLUTE: 0.1 E9/L (ref 0–0.2)
BASOPHILS RELATIVE PERCENT: 1.1 % (ref 0–2)
BILIRUB SERPL-MCNC: 0.6 MG/DL (ref 0–1.2)
BUN BLDV-MCNC: 16 MG/DL (ref 8–23)
CALCIUM SERPL-MCNC: 9.1 MG/DL (ref 8.6–10.2)
CHLORIDE BLD-SCNC: 103 MMOL/L (ref 98–107)
CHOLESTEROL, TOTAL: 154 MG/DL (ref 0–199)
CO2: 23 MMOL/L (ref 22–29)
CREAT SERPL-MCNC: 1 MG/DL (ref 0.7–1.2)
CREATININE URINE: 99 MG/DL (ref 40–278)
EOSINOPHILS ABSOLUTE: 0.23 E9/L (ref 0.05–0.5)
EOSINOPHILS RELATIVE PERCENT: 2.6 % (ref 0–6)
GFR AFRICAN AMERICAN: >60
GFR NON-AFRICAN AMERICAN: >60 ML/MIN/1.73
GLUCOSE BLD-MCNC: 110 MG/DL (ref 74–99)
HBA1C MFR BLD: 5.6 % (ref 4–5.6)
HCT VFR BLD CALC: 42.7 % (ref 37–54)
HDLC SERPL-MCNC: 34 MG/DL
HEMOGLOBIN: 14 G/DL (ref 12.5–16.5)
IMMATURE GRANULOCYTES #: 0.06 E9/L
IMMATURE GRANULOCYTES %: 0.7 % (ref 0–5)
LDL CHOLESTEROL CALCULATED: 94 MG/DL (ref 0–99)
LYMPHOCYTES ABSOLUTE: 1.56 E9/L (ref 1.5–4)
LYMPHOCYTES RELATIVE PERCENT: 17.8 % (ref 20–42)
MCH RBC QN AUTO: 30.8 PG (ref 26–35)
MCHC RBC AUTO-ENTMCNC: 32.8 % (ref 32–34.5)
MCV RBC AUTO: 93.8 FL (ref 80–99.9)
MICROALBUMIN UR-MCNC: <12 MG/L
MICROALBUMIN/CREAT UR-RTO: ABNORMAL (ref 0–30)
MONOCYTES ABSOLUTE: 0.49 E9/L (ref 0.1–0.95)
MONOCYTES RELATIVE PERCENT: 5.6 % (ref 2–12)
NEUTROPHILS ABSOLUTE: 6.31 E9/L (ref 1.8–7.3)
NEUTROPHILS RELATIVE PERCENT: 72.2 % (ref 43–80)
PDW BLD-RTO: 12.9 FL (ref 11.5–15)
PLATELET # BLD: 244 E9/L (ref 130–450)
PMV BLD AUTO: 11.1 FL (ref 7–12)
POTASSIUM SERPL-SCNC: 4 MMOL/L (ref 3.5–5)
RBC # BLD: 4.55 E12/L (ref 3.8–5.8)
SODIUM BLD-SCNC: 140 MMOL/L (ref 132–146)
T4 FREE: 1.11 NG/DL (ref 0.93–1.7)
TOTAL PROTEIN: 6.7 G/DL (ref 6.4–8.3)
TRIGL SERPL-MCNC: 132 MG/DL (ref 0–149)
TSH SERPL DL<=0.05 MIU/L-ACNC: 3.32 UIU/ML (ref 0.27–4.2)
URIC ACID, SERUM: 5.9 MG/DL (ref 3.4–7)
VITAMIN D 25-HYDROXY: 23 NG/ML (ref 30–100)
VLDLC SERPL CALC-MCNC: 26 MG/DL
WBC # BLD: 8.8 E9/L (ref 4.5–11.5)

## 2020-07-06 PROCEDURE — 1036F TOBACCO NON-USER: CPT | Performed by: FAMILY MEDICINE

## 2020-07-06 PROCEDURE — 84443 ASSAY THYROID STIM HORMONE: CPT

## 2020-07-06 PROCEDURE — 82044 UR ALBUMIN SEMIQUANTITATIVE: CPT

## 2020-07-06 PROCEDURE — 82306 VITAMIN D 25 HYDROXY: CPT

## 2020-07-06 PROCEDURE — 84550 ASSAY OF BLOOD/URIC ACID: CPT

## 2020-07-06 PROCEDURE — 99214 OFFICE O/P EST MOD 30 MIN: CPT | Performed by: FAMILY MEDICINE

## 2020-07-06 PROCEDURE — 36415 COLL VENOUS BLD VENIPUNCTURE: CPT

## 2020-07-06 PROCEDURE — 3017F COLORECTAL CA SCREEN DOC REV: CPT | Performed by: FAMILY MEDICINE

## 2020-07-06 PROCEDURE — G8427 DOCREV CUR MEDS BY ELIG CLIN: HCPCS | Performed by: FAMILY MEDICINE

## 2020-07-06 PROCEDURE — G8417 CALC BMI ABV UP PARAM F/U: HCPCS | Performed by: FAMILY MEDICINE

## 2020-07-06 PROCEDURE — 85025 COMPLETE CBC W/AUTO DIFF WBC: CPT

## 2020-07-06 PROCEDURE — 82570 ASSAY OF URINE CREATININE: CPT

## 2020-07-06 PROCEDURE — 4040F PNEUMOC VAC/ADMIN/RCVD: CPT | Performed by: FAMILY MEDICINE

## 2020-07-06 PROCEDURE — 80053 COMPREHEN METABOLIC PANEL: CPT

## 2020-07-06 PROCEDURE — 80061 LIPID PANEL: CPT

## 2020-07-06 PROCEDURE — 84439 ASSAY OF FREE THYROXINE: CPT

## 2020-07-06 PROCEDURE — 1123F ACP DISCUSS/DSCN MKR DOCD: CPT | Performed by: FAMILY MEDICINE

## 2020-07-06 PROCEDURE — 83036 HEMOGLOBIN GLYCOSYLATED A1C: CPT

## 2020-07-06 RX ORDER — POLYETHYLENE GLYCOL 3350 17 G/17G
17 POWDER, FOR SOLUTION ORAL DAILY
Qty: 1530 G | Refills: 1 | Status: SHIPPED | OUTPATIENT
Start: 2020-07-06 | End: 2020-10-04

## 2020-07-06 RX ORDER — TAMSULOSIN HYDROCHLORIDE 0.4 MG/1
0.4 CAPSULE ORAL DAILY
Qty: 90 CAPSULE | Refills: 1 | Status: SHIPPED
Start: 2020-07-06 | Stop reason: SDUPTHER

## 2020-07-06 ASSESSMENT — ENCOUNTER SYMPTOMS
SHORTNESS OF BREATH: 0
COUGH: 0
NAUSEA: 0
DIARRHEA: 0
ABDOMINAL PAIN: 0
WHEEZING: 0
VOMITING: 0
BACK PAIN: 0
CONSTIPATION: 1

## 2020-07-06 NOTE — PROGRESS NOTES
20  Kathie Scott : 1950 Sex: male  Age: 72 y.o. Chief Complaint   Patient presents with    Hypertension     HPI:  72 y.o. male patient of Dr. Ashley Bryan presents today for 6 month(s) follow up of chronic medical conditions and FBW. Patient's chart, medical, surgical and medication history all reviewed. Hypertension   The patient presents today for follow up of HTN. The problem is well controlled. Risk factors for coronary artery disease include Age > 27, male, HTN and elevated cholesterol. Current treatments include clonidine (Catapres), hydrochlorothiazide (HCTZ), lisinopril (Prinivil) and Verapamil. The patient is compliant all of the time. Lifestyle changes the patient has made include none. Today the patient is complaining of none. Hypothyroidism  Patient presents for routine follow up of Hypothyroidism. Current symptoms: constipation. Patient denies change in energy level, diarrhea, heat / cold intolerance, nervousness, palpitations and weight changes. Symptoms have been well-controlled. No difficulty swallowing or masses felt. Last TSH was 2.980. Patient is currently taking levothryoxine 25 mcg. ROS:  Review of Systems   Constitutional: Negative for chills, fatigue and fever. Respiratory: Negative for cough, shortness of breath and wheezing. Cardiovascular: Negative for chest pain and palpitations. Gastrointestinal: Positive for constipation. Negative for abdominal pain, diarrhea, nausea and vomiting. Genitourinary: Positive for difficulty urinating. Nocturia   Musculoskeletal: Negative for arthralgias and back pain. Skin: Negative for rash. Neurological: Negative for dizziness and headaches. Psychiatric/Behavioral: Negative for dysphoric mood. The patient is not nervous/anxious. All other systems reviewed and are negative.        Current Outpatient Medications on File Prior to Visit   Medication Sig Dispense Refill    fluticasone (FLONASE) 50 MCG/ACT nasal spray SHAKE LIQUID AND USE 2 SPRAYS IN EACH NOSTRIL EVERY DAY 16 g 0    verapamil (VERELAN) 240 MG extended release capsule Take 1 capsule by mouth 2 times daily 180 capsule 0    levothyroxine (SYNTHROID) 25 MCG tablet TAKE 1 TABLET BY MOUTH DAILY 90 tablet 1    cloNIDine (CATAPRES) 0.1 MG tablet Take 2 tablets by mouth nightly 2 tablets nightly 180 tablet 0    lisinopril-hydroCHLOROthiazide (PRINZIDE;ZESTORETIC) 20-12.5 MG per tablet Take 1 tablet by mouth daily 90 tablet 0     No current facility-administered medications on file prior to visit. Allergies   Allergen Reactions    Other      SEASONAL       Past Medical History:   Diagnosis Date    BPH (benign prostatic hyperplasia)     Colon polyp 01/2018    Dr. Elder WEINER (degenerative joint disease) of knee     Hypertension     Spinal stenosis, lumbar      Past Surgical History:   Procedure Laterality Date    CERVICAL LAMINECTOMY  1989    LUMBAR LAMINECTOMY  2010    MYRINGOTOMY Right 01/14/2016    Nasal endoscopy with biopsy,biopsy post nasal pharynx    MYRINGOTOMY Right 01/24/2019    with right eustachian tube balloon dilation    MYRINGOTOMY AND TYMPANOSTOMY TUBE PLACEMENT Left 12/08/2016    Left myringotomy with T-tube insertion    MYRINGOTOMY AND TYMPANOSTOMY TUBE PLACEMENT Left 03/15/2018    Left myringotomy with tympanostomy tube placement     MYRINGOTOMY AND TYMPANOSTOMY TUBE PLACEMENT Right 1/24/2019    RIGHT MYRINGOTOMY TUBE REPLACEMENT. RIGHT EUSTACHIAN TUBE DILATION performed by Jessie Ibarra DO at [address] [place] [address] Left 03/15/2018    Nasal endoscopy with eustachian tube balloon dilation, left.     [place] OFFICE/OUTPT VISIT,PROCEDURE ONLY Left 3/15/2018    LEFT EUSTACHIAN TUBE BALLOON DILATION AND LEFT MYRINGOTOMY WITH T-TUBE INSERTION performed by Jessie Ibarra DO at [facility] 7  2003    x2; hydrocelectomy    TONSILLECTOMY      TOTAL KNEE ARTHROPLASTY Right 01/07/2013    Right SHEREEN Chao MD     Family History   Problem Relation Age of Onset   Danay Pulling Stroke Mother     Osteoarthritis Mother     Coronary Art Dis Father     Pacemaker Father     Hypertension Father     No Known Problems Sister      Social History     Socioeconomic History    Marital status:      Spouse name: Not on file    Number of children: Not on file    Years of education: Not on file    Highest education level: Not on file   Occupational History    Occupation: retired , gasoline loads   Social Needs    Financial resource strain: Not on file    Food insecurity     Worry: Not on file     Inability: Not on file   [employer] needs     Medical: Not on file     Non-medical: Not on file   Tobacco Use    Smoking status: Never Smoker    Smokeless tobacco: Never Used   Substance and Sexual Activity    Alcohol use: No     Alcohol/week: 0.0 standard drinks    Drug use: No    Sexual activity: Yes     Partners: Female   Lifestyle    Physical activity     Days per week: Not on file     Minutes per session: Not on file    Stress: Not on file   Relationships    Social connections     Talks on phone: Not on file     Gets together: Not on file     Attends Confucianist service: Not on file     Active member of club or organization: Not on file     Attends meetings of clubs or organizations: Not on file     Relationship status: Not on file    Intimate partner violence     Fear of current or ex partner: Not on file     Emotionally abused: Not on file     Physically abused: Not on file     Forced sexual activity: Not on file   Other Topics Concern    Not on file   Social History Narrative    Not on file       Vitals:    07/06/20 0819   BP: 130/80   Pulse: 66   Temp: 97.6 °F (36.4 °C)   SpO2: 95%   Weight: 250 lb (113.4 kg)   Height: 6' (1.829 m)       Physical Exam:  Physical Exam  Vitals signs and nursing note reviewed.    Constitutional:       General: He is not in acute distress. Appearance: Normal appearance. He is well-developed. He is obese. He is not ill-appearing. HENT:      Head: Normocephalic and atraumatic. Right Ear: Hearing and external ear normal.      Left Ear: Hearing and external ear normal.      Nose:      Comments: Patient wearing mask  Eyes:      General: Lids are normal. No scleral icterus. Extraocular Movements: Extraocular movements intact. Conjunctiva/sclera: Conjunctivae normal.   Neck:      Musculoskeletal: Normal range of motion and neck supple. Thyroid: No thyromegaly. Vascular: No carotid bruit. Cardiovascular:      Rate and Rhythm: Normal rate and regular rhythm. Heart sounds: Normal heart sounds. No murmur. Pulmonary:      Effort: Pulmonary effort is normal. No respiratory distress. Breath sounds: Normal breath sounds. No wheezing. Musculoskeletal: Normal range of motion. General: No tenderness or deformity. Right lower leg: No edema. Left lower leg: No edema. Lymphadenopathy:      Cervical: No cervical adenopathy. Skin:     General: Skin is warm and dry. Findings: No rash. Neurological:      General: No focal deficit present. Mental Status: He is alert and oriented to person, place, and time. Gait: Gait normal.   Psychiatric:         Mood and Affect: Mood and affect normal.         Speech: Speech normal.         Behavior: Behavior normal.         Thought Content:  Thought content normal.         Labs:  CBC with Differential:    Lab Results   Component Value Date    WBC 9.3 11/08/2019    RBC 4.86 11/08/2019    HGB 14.4 11/08/2019    HCT 44.8 11/08/2019     11/08/2019    MCV 92.2 11/08/2019    MCH 29.6 11/08/2019    MCHC 32.1 11/08/2019    RDW 13.9 11/08/2019    SEGSPCT 70 01/10/2013    LYMPHOPCT 17.2 11/08/2019    MONOPCT 6.5 11/08/2019    BASOPCT 1.2 11/08/2019    MONOSABS 0.61 11/08/2019    LYMPHSABS 1.61 11/08/2019    EOSABS 0.32 11/08/2019    BASOSABS 0.11 11/08/2019     CMP:    Lab Results   Component Value Date     11/08/2019    K 4.2 11/08/2019     11/08/2019    CO2 21 11/08/2019    BUN 15 11/08/2019    CREATININE 1.0 11/08/2019    GFRAA >60 11/08/2019    LABGLOM >60 11/08/2019    GLUCOSE 111 11/08/2019    PROT 7.1 11/08/2019    LABALBU 4.4 11/08/2019    CALCIUM 9.2 11/08/2019    BILITOT 0.5 11/08/2019    ALKPHOS 48 11/08/2019    AST 16 11/08/2019    ALT 13 11/08/2019     HgBA1c:    Lab Results   Component Value Date    LABA1C 5.8 11/08/2019     Microalbumen/Creatinine ratio:  No components found for: RUCREAT  FLP:    Lab Results   Component Value Date    TRIG 126 11/08/2019    HDL 40 11/08/2019    LDLCALC 101 11/08/2019    LABVLDL 25 11/08/2019     TSH:    Lab Results   Component Value Date    TSH 2.980 12/16/2019     PSA:   Lab Results   Component Value Date    PSA 3.79 11/08/2019        Assessment and Plan:  Maria C Ross was seen today for hypertension. Diagnoses and all orders for this visit:    Essential hypertension  -     CBC Auto Differential; Future  -     Comprehensive Metabolic Panel; Future  -     Lipid Panel; Future  -     TSH without Reflex; Future  -     Uric Acid; Future  -     Microalbumin / Creatinine Urine Ratio; Future  Well controlled. Patient due for labs. Acquired hypothyroidism  -     TSH without Reflex; Future  -     T4, Free; Future  Has been stable, but patient now having issues with constipation. Slow transit constipation  -     polyethylene glycol (GLYCOLAX) 17 GM/SCOOP powder; Take 17 g by mouth daily  Does not drink enough water. Discussed increasing intake and trying Miralax first.     Nocturia  -     tamsulosin (FLOMAX) 0.4 MG capsule; Take 1 capsule by mouth daily  States that he dribbles urine and is up 2-3 times per night. Vitamin D insufficiency  -     Vitamin D 25 Hydroxy; Future    Impaired fasting glucose  -     Hemoglobin A1C; Future        Return in about 6 months (around 1/6/2021) for Recheck.       Seen Juvencio العلي, DO

## 2020-08-24 RX ORDER — LISINOPRIL AND HYDROCHLOROTHIAZIDE 20; 12.5 MG/1; MG/1
1 TABLET ORAL DAILY
Qty: 90 TABLET | Refills: 1 | Status: SHIPPED
Start: 2020-08-24 | End: 2021-01-11 | Stop reason: SDUPTHER

## 2020-08-24 RX ORDER — CLONIDINE HYDROCHLORIDE 0.1 MG/1
TABLET ORAL
Qty: 180 TABLET | Refills: 1 | Status: SHIPPED
Start: 2020-08-24 | End: 2021-01-11 | Stop reason: SDUPTHER

## 2020-08-24 RX ORDER — VERAPAMIL HYDROCHLORIDE 240 MG/1
CAPSULE, EXTENDED RELEASE ORAL
Qty: 180 CAPSULE | Refills: 1 | Status: SHIPPED
Start: 2020-08-24 | End: 2021-01-11 | Stop reason: SDUPTHER

## 2020-09-29 ENCOUNTER — OFFICE VISIT (OUTPATIENT)
Dept: ENT CLINIC | Age: 70
End: 2020-09-29
Payer: MEDICARE

## 2020-09-29 ENCOUNTER — PROCEDURE VISIT (OUTPATIENT)
Dept: AUDIOLOGY | Age: 70
End: 2020-09-29
Payer: MEDICARE

## 2020-09-29 VITALS
HEIGHT: 72 IN | BODY MASS INDEX: 34 KG/M2 | DIASTOLIC BLOOD PRESSURE: 90 MMHG | WEIGHT: 251 LBS | SYSTOLIC BLOOD PRESSURE: 144 MMHG

## 2020-09-29 PROCEDURE — G8427 DOCREV CUR MEDS BY ELIG CLIN: HCPCS | Performed by: OTOLARYNGOLOGY

## 2020-09-29 PROCEDURE — 1123F ACP DISCUSS/DSCN MKR DOCD: CPT | Performed by: OTOLARYNGOLOGY

## 2020-09-29 PROCEDURE — 3017F COLORECTAL CA SCREEN DOC REV: CPT | Performed by: OTOLARYNGOLOGY

## 2020-09-29 PROCEDURE — 99213 OFFICE O/P EST LOW 20 MIN: CPT | Performed by: OTOLARYNGOLOGY

## 2020-09-29 PROCEDURE — G8417 CALC BMI ABV UP PARAM F/U: HCPCS | Performed by: OTOLARYNGOLOGY

## 2020-09-29 PROCEDURE — 4040F PNEUMOC VAC/ADMIN/RCVD: CPT | Performed by: OTOLARYNGOLOGY

## 2020-09-29 PROCEDURE — 1036F TOBACCO NON-USER: CPT | Performed by: OTOLARYNGOLOGY

## 2020-09-29 PROCEDURE — 92567 TYMPANOMETRY: CPT | Performed by: AUDIOLOGIST

## 2020-09-29 RX ORDER — AZELASTINE 1 MG/ML
2 SPRAY, METERED NASAL 2 TIMES DAILY
Qty: 1 BOTTLE | Refills: 1 | Status: SHIPPED
Start: 2020-09-29 | End: 2021-07-20 | Stop reason: ALTCHOICE

## 2020-09-29 ASSESSMENT — ENCOUNTER SYMPTOMS
RESPIRATORY NEGATIVE: 1
SORE THROAT: 0
COUGH: 0
TROUBLE SWALLOWING: 0
VOMITING: 0
ALLERGIC/IMMUNOLOGIC NEGATIVE: 1
SINUS PRESSURE: 0
SHORTNESS OF BREATH: 0

## 2020-09-29 NOTE — PROGRESS NOTES
This patient was referred for audiometric/tympanometric testing by Dr. Abner Larios due to history of ear infections. Patient reported doing well overall with his PE tubes. Tympanometry revealed negative middle ear pressure (-200 daPa), in the left ear. In the right ear, no seal could be obtained, consistent with patent PE tubes. The results were reviewed with the patient. Recommendations for follow up will be made pending physician consult.       Hang Anand CCC-A  [address] [facility] [phone]  Electronically signed by Hang Anand on 9/29/2020 at 9:23 AM

## 2020-09-29 NOTE — PROGRESS NOTES
[facility] Otolaryngology       Patient Name:  Aguilar Denny  :  1950     CHIEF C/O:    Chief Complaint   Patient presents with    Ear Problem     4 month tube check       HISTORY OBTAINED FROM:  patient    HISTORY OF PRESENT ILLNESS:       Severino Arredondo is a 72y.o. year old male, here today for follow up of history of chronic otitis media with effusion status post bilateral tympanostomy tubes with recent replacement of the right T tube and R ETD procedure 19 and original placement on 3/15/18 with left ETD procedure. Patient is doing well. On going tinnitus. Denies hearing changes, otorrhea. Past Medical History:   Diagnosis Date    BPH (benign prostatic hyperplasia)     Colon polyp 2018    Dr. Keshav Mcadams DJD (degenerative joint disease) of knee     Hypertension     Spinal stenosis, lumbar      Past Surgical History:   Procedure Laterality Date    CERVICAL LAMINECTOMY      LUMBAR LAMINECTOMY  2010    MYRINGOTOMY Right 2016    Nasal endoscopy with biopsy,biopsy post nasal pharynx    MYRINGOTOMY Right 2019    with right eustachian tube balloon dilation    MYRINGOTOMY AND TYMPANOSTOMY TUBE PLACEMENT Left 2016    Left myringotomy with T-tube insertion    MYRINGOTOMY AND TYMPANOSTOMY TUBE PLACEMENT Left 03/15/2018    Left myringotomy with tympanostomy tube placement     MYRINGOTOMY AND TYMPANOSTOMY TUBE PLACEMENT Right 2019    RIGHT MYRINGOTOMY TUBE REPLACEMENT. RIGHT EUSTACHIAN TUBE DILATION performed by Farrah Hamm DO at [address] Left 03/15/2018    Nasal endoscopy with eustachian tube balloon dilation, left.  [place] OFFICE/OUTPT VISIT,PROCEDURE ONLY Left 3/15/2018    LEFT EUSTACHIAN TUBE BALLOON DILATION AND LEFT MYRINGOTOMY WITH T-TUBE INSERTION performed by Farrah Hamm DO at [facility] [address]  2003    x2; hydrocelectomy    TONSILLECTOMY      TOTAL KNEE ARTHROPLASTY Right 2013    Right TKA  T.  Josué Downey MD Current Outpatient Medications:     cloNIDine (CATAPRES) 0.1 MG tablet, TAKE 2 TABLETS BY MOUTH EVERY NIGHT, Disp: 180 tablet, Rfl: 1    verapamil (VERELAN) 240 MG extended release capsule, TAKE 1 CAPSULE BY MOUTH TWICE DAILY, Disp: 180 capsule, Rfl: 1    lisinopril-hydroCHLOROthiazide (PRINZIDE;ZESTORETIC) 20-12.5 MG per tablet, TAKE 1 TABLET BY MOUTH DAILY, Disp: 90 tablet, Rfl: 1    fluticasone (FLONASE) 50 MCG/ACT nasal spray, SHAKE LIQUID AND USE 2 SPRAYS IN EACH NOSTRIL EVERY DAY, Disp: 48 g, Rfl: 3    polyethylene glycol (GLYCOLAX) 17 GM/SCOOP powder, Take 17 g by mouth daily, Disp: 1530 g, Rfl: 1    tamsulosin (FLOMAX) 0.4 MG capsule, Take 1 capsule by mouth daily, Disp: 90 capsule, Rfl: 1    levothyroxine (SYNTHROID) 25 MCG tablet, TAKE 1 TABLET BY MOUTH DAILY, Disp: 90 tablet, Rfl: 1  Other  Social History     Tobacco Use    Smoking status: Never Smoker    Smokeless tobacco: Never Used   Substance Use Topics    Alcohol use: No     Alcohol/week: 0.0 standard drinks    Drug use: No     Family History   Problem Relation Age of Onset    Stroke Mother     Osteoarthritis Mother     Coronary Art Dis Father     Pacemaker Father     Hypertension Father     No Known Problems Sister        Review of Systems   Constitutional: Negative. Negative for chills and fever. HENT: Positive for tinnitus. Negative for congestion, dental problem, ear discharge, ear pain, hearing loss, sinus pressure, sneezing, sore throat and trouble swallowing. Respiratory: Negative. Negative for cough and shortness of breath. Cardiovascular: Negative for chest pain and palpitations. Gastrointestinal: Negative for vomiting. Skin: Negative for rash. Allergic/Immunologic: Negative. Negative for environmental allergies. Neurological: Negative. Negative for dizziness and headaches. Hematological: Does not bruise/bleed easily. All other systems reviewed and are negative.       BP (!) 144/90   Ht 6' have seen and examined the patient and the key elements of the encounter have been performed by me. I agree with the assessment, plan and orders as documented by the resident. Patient here for follow up of medical problems. Remainder of medical problems as per resident note.       Mireya Hernandez  6/1/20

## 2020-11-02 RX ORDER — LEVOTHYROXINE SODIUM 0.03 MG/1
25 TABLET ORAL DAILY
Qty: 90 TABLET | Refills: 1 | Status: SHIPPED
Start: 2020-11-02 | End: 2021-05-05

## 2020-11-19 LAB — PROSTATE SPECIFIC ANTIGEN: 4.54 NG/ML (ref 0–4)

## 2020-12-28 RX ORDER — TAMSULOSIN HYDROCHLORIDE 0.4 MG/1
0.4 CAPSULE ORAL DAILY
Qty: 90 CAPSULE | Refills: 1 | Status: SHIPPED
Start: 2020-12-28 | Stop reason: SDUPTHER

## 2021-01-10 ASSESSMENT — ENCOUNTER SYMPTOMS
NAUSEA: 0
BACK PAIN: 0
WHEEZING: 0
ABDOMINAL PAIN: 0
VOMITING: 0
DIARRHEA: 0
SHORTNESS OF BREATH: 0
COUGH: 0

## 2021-01-11 ENCOUNTER — OFFICE VISIT (OUTPATIENT)
Dept: PRIMARY CARE CLINIC | Age: 71
End: 2021-01-11
Payer: MEDICARE

## 2021-01-11 VITALS
BODY MASS INDEX: 34.4 KG/M2 | HEART RATE: 79 BPM | DIASTOLIC BLOOD PRESSURE: 82 MMHG | SYSTOLIC BLOOD PRESSURE: 138 MMHG | TEMPERATURE: 97.9 F | OXYGEN SATURATION: 96 % | WEIGHT: 254 LBS | HEIGHT: 72 IN

## 2021-01-11 DIAGNOSIS — E55.9 VITAMIN D INSUFFICIENCY: ICD-10-CM

## 2021-01-11 DIAGNOSIS — I10 ESSENTIAL HYPERTENSION: ICD-10-CM

## 2021-01-11 DIAGNOSIS — I10 ESSENTIAL HYPERTENSION: Primary | ICD-10-CM

## 2021-01-11 DIAGNOSIS — E03.9 ACQUIRED HYPOTHYROIDISM: ICD-10-CM

## 2021-01-11 DIAGNOSIS — R73.01 IMPAIRED FASTING GLUCOSE: ICD-10-CM

## 2021-01-11 DIAGNOSIS — R09.82 POSTNASAL DRIP: ICD-10-CM

## 2021-01-11 LAB
ALBUMIN SERPL-MCNC: 4.3 G/DL (ref 3.5–5.2)
ALP BLD-CCNC: 49 U/L (ref 40–129)
ALT SERPL-CCNC: 11 U/L (ref 0–40)
ANION GAP SERPL CALCULATED.3IONS-SCNC: 11 MMOL/L (ref 7–16)
AST SERPL-CCNC: 13 U/L (ref 0–39)
BASOPHILS ABSOLUTE: 0.13 E9/L (ref 0–0.2)
BASOPHILS RELATIVE PERCENT: 1.4 % (ref 0–2)
BILIRUB SERPL-MCNC: 0.6 MG/DL (ref 0–1.2)
BILIRUBIN URINE: NEGATIVE
BLOOD, URINE: NEGATIVE
BUN BLDV-MCNC: 19 MG/DL (ref 8–23)
CALCIUM SERPL-MCNC: 9.4 MG/DL (ref 8.6–10.2)
CHLORIDE BLD-SCNC: 104 MMOL/L (ref 98–107)
CHOLESTEROL, TOTAL: 155 MG/DL (ref 0–199)
CLARITY: CLEAR
CO2: 26 MMOL/L (ref 22–29)
COLOR: YELLOW
CREAT SERPL-MCNC: 1.1 MG/DL (ref 0.7–1.2)
EOSINOPHILS ABSOLUTE: 0.27 E9/L (ref 0.05–0.5)
EOSINOPHILS RELATIVE PERCENT: 2.9 % (ref 0–6)
GFR AFRICAN AMERICAN: >60
GFR NON-AFRICAN AMERICAN: >60 ML/MIN/1.73
GLUCOSE BLD-MCNC: 106 MG/DL (ref 74–99)
GLUCOSE URINE: NEGATIVE MG/DL
HBA1C MFR BLD: 5.8 % (ref 4–5.6)
HCT VFR BLD CALC: 42.3 % (ref 37–54)
HDLC SERPL-MCNC: 41 MG/DL
HEMOGLOBIN: 14.1 G/DL (ref 12.5–16.5)
IMMATURE GRANULOCYTES #: 0.06 E9/L
IMMATURE GRANULOCYTES %: 0.7 % (ref 0–5)
KETONES, URINE: NEGATIVE MG/DL
LDL CHOLESTEROL CALCULATED: 94 MG/DL (ref 0–99)
LEUKOCYTE ESTERASE, URINE: NEGATIVE
LYMPHOCYTES ABSOLUTE: 1.66 E9/L (ref 1.5–4)
LYMPHOCYTES RELATIVE PERCENT: 18 % (ref 20–42)
MCH RBC QN AUTO: 30.1 PG (ref 26–35)
MCHC RBC AUTO-ENTMCNC: 33.3 % (ref 32–34.5)
MCV RBC AUTO: 90.4 FL (ref 80–99.9)
MONOCYTES ABSOLUTE: 0.63 E9/L (ref 0.1–0.95)
MONOCYTES RELATIVE PERCENT: 6.8 % (ref 2–12)
NEUTROPHILS ABSOLUTE: 6.48 E9/L (ref 1.8–7.3)
NEUTROPHILS RELATIVE PERCENT: 70.2 % (ref 43–80)
NITRITE, URINE: NEGATIVE
PDW BLD-RTO: 13.4 FL (ref 11.5–15)
PH UA: 6.5 (ref 5–9)
PLATELET # BLD: 236 E9/L (ref 130–450)
PMV BLD AUTO: 10.7 FL (ref 7–12)
POTASSIUM SERPL-SCNC: 4.2 MMOL/L (ref 3.5–5)
PROTEIN UA: NEGATIVE MG/DL
RBC # BLD: 4.68 E12/L (ref 3.8–5.8)
SODIUM BLD-SCNC: 141 MMOL/L (ref 132–146)
SPECIFIC GRAVITY UA: 1.02 (ref 1–1.03)
TOTAL PROTEIN: 6.9 G/DL (ref 6.4–8.3)
TRIGL SERPL-MCNC: 99 MG/DL (ref 0–149)
TSH SERPL DL<=0.05 MIU/L-ACNC: 2.47 UIU/ML (ref 0.27–4.2)
UROBILINOGEN, URINE: 0.2 E.U./DL
VITAMIN D 25-HYDROXY: 19 NG/ML (ref 30–100)
VLDLC SERPL CALC-MCNC: 20 MG/DL
WBC # BLD: 9.2 E9/L (ref 4.5–11.5)

## 2021-01-11 PROCEDURE — G8482 FLU IMMUNIZE ORDER/ADMIN: HCPCS | Performed by: FAMILY MEDICINE

## 2021-01-11 PROCEDURE — 1123F ACP DISCUSS/DSCN MKR DOCD: CPT | Performed by: FAMILY MEDICINE

## 2021-01-11 PROCEDURE — G8417 CALC BMI ABV UP PARAM F/U: HCPCS | Performed by: FAMILY MEDICINE

## 2021-01-11 PROCEDURE — 1036F TOBACCO NON-USER: CPT | Performed by: FAMILY MEDICINE

## 2021-01-11 PROCEDURE — G8427 DOCREV CUR MEDS BY ELIG CLIN: HCPCS | Performed by: FAMILY MEDICINE

## 2021-01-11 PROCEDURE — 4040F PNEUMOC VAC/ADMIN/RCVD: CPT | Performed by: FAMILY MEDICINE

## 2021-01-11 PROCEDURE — 3017F COLORECTAL CA SCREEN DOC REV: CPT | Performed by: FAMILY MEDICINE

## 2021-01-11 PROCEDURE — 99214 OFFICE O/P EST MOD 30 MIN: CPT | Performed by: FAMILY MEDICINE

## 2021-01-11 RX ORDER — METHYLPREDNISOLONE 4 MG/1
TABLET ORAL
Qty: 21 TABLET | Refills: 0 | Status: SHIPPED | OUTPATIENT
Start: 2021-01-11 | End: 2021-01-17

## 2021-01-11 RX ORDER — VERAPAMIL HYDROCHLORIDE 240 MG/1
240 CAPSULE, EXTENDED RELEASE ORAL NIGHTLY
Qty: 90 CAPSULE | Refills: 1 | Status: SHIPPED
Start: 2021-01-11 | End: 2021-07-20 | Stop reason: SDUPTHER

## 2021-01-11 RX ORDER — CLONIDINE HYDROCHLORIDE 0.1 MG/1
0.2 TABLET ORAL NIGHTLY
Qty: 180 TABLET | Refills: 1 | Status: SHIPPED
Start: 2021-01-11 | End: 2021-07-20 | Stop reason: SDUPTHER

## 2021-01-11 RX ORDER — MONTELUKAST SODIUM 10 MG/1
10 TABLET ORAL NIGHTLY
Qty: 90 TABLET | Refills: 1 | Status: SHIPPED
Start: 2021-01-11 | End: 2021-07-16

## 2021-01-11 RX ORDER — LISINOPRIL AND HYDROCHLOROTHIAZIDE 20; 12.5 MG/1; MG/1
1 TABLET ORAL DAILY
Qty: 90 TABLET | Refills: 1 | Status: SHIPPED
Start: 2021-01-11 | End: 2021-07-20 | Stop reason: SDUPTHER

## 2021-01-11 ASSESSMENT — ENCOUNTER SYMPTOMS: CONSTIPATION: 0

## 2021-01-11 NOTE — PROGRESS NOTES
21  Alison Constantino : 1950 Sex: male  Age: 72 y.o. Chief Complaint   Patient presents with    Hypertension     HPI:  72 y.o. male patient presents today for 6 month(s) follow up of chronic medical conditions and FBW. Patient's chart, medical, surgical and medication history all reviewed. Hypertension   The patient presents today for follow up of HTN. The problem is well controlled. Risk factors for coronary artery disease include Age > 27, male, HTN and elevated cholesterol. Current treatments include clonidine (Catapres), hydrochlorothiazide (HCTZ), lisinopril (Prinivil) and Verapamil. The patient is compliant all of the time. Lifestyle changes the patient has made include none. Today the patient is complaining of none. Hypothyroidism  Patient presents for routine follow up of Hypothyroidism. Current symptoms: none. Patient denies change in energy level, diarrhea, heat / cold intolerance, nervousness, palpitations and weight changes. Symptoms have been well-controlled. No difficulty swallowing or masses felt. Last TSH was 2.980. Patient is currently taking levothryoxine 25 mcg. ROS:  Review of Systems   Constitutional: Negative for chills, fatigue and fever. HENT: Positive for congestion and postnasal drip. Respiratory: Negative for cough, shortness of breath and wheezing. Cardiovascular: Negative for chest pain and palpitations. Gastrointestinal: Negative for abdominal pain, constipation, diarrhea, nausea and vomiting. Musculoskeletal: Negative for arthralgias and back pain. Skin: Negative for rash. Neurological: Negative for dizziness and headaches. Psychiatric/Behavioral: Negative for dysphoric mood. The patient is not nervous/anxious. All other systems reviewed and are negative.        Current Outpatient Medications on File Prior to Visit   Medication Sig Dispense Refill    tamsulosin (FLOMAX) 0.4 MG capsule TAKE 1 CAPSULE BY MOUTH DAILY 90 capsule 1  levothyroxine (SYNTHROID) 25 MCG tablet TAKE 1 TABLET BY MOUTH DAILY 90 tablet 1    azelastine (ASTELIN) 0.1 % nasal spray 2 sprays by Nasal route 2 times daily Use in each nostril as directed 1 Bottle 1    fluticasone (FLONASE) 50 MCG/ACT nasal spray SHAKE LIQUID AND USE 2 SPRAYS IN EACH NOSTRIL EVERY DAY 48 g 3    [DISCONTINUED] tamsulosin (FLOMAX) 0.4 MG capsule Take 1 capsule by mouth daily 90 capsule 1     No current facility-administered medications on file prior to visit. Allergies   Allergen Reactions    Other      SEASONAL       Past Medical History:   Diagnosis Date    BPH (benign prostatic hyperplasia)     Colon polyp 01/2018    Dr. Jameson Live DJD (degenerative joint disease) of knee     Hypertension     Spinal stenosis, lumbar      Past Surgical History:   Procedure Laterality Date    CERVICAL LAMINECTOMY  1989    LUMBAR LAMINECTOMY  2010    MYRINGOTOMY Right 01/14/2016    Nasal endoscopy with biopsy,biopsy post nasal pharynx    MYRINGOTOMY Right 01/24/2019    with right eustachian tube balloon dilation    MYRINGOTOMY AND TYMPANOSTOMY TUBE PLACEMENT Left 12/08/2016    Left myringotomy with T-tube insertion    MYRINGOTOMY AND TYMPANOSTOMY TUBE PLACEMENT Left 03/15/2018    Left myringotomy with tympanostomy tube placement     MYRINGOTOMY AND TYMPANOSTOMY TUBE PLACEMENT Right 1/24/2019    RIGHT MYRINGOTOMY TUBE REPLACEMENT. RIGHT EUSTACHIAN TUBE DILATION performed by Fabby Ash DO at [address] [place] [address] Left 03/15/2018    Nasal endoscopy with eustachian tube balloon dilation, left.  UT OFFICE/OUTPT VISIT,PROCEDURE ONLY Left 3/15/2018    LEFT EUSTACHIAN TUBE BALLOON DILATION AND LEFT MYRINGOTOMY WITH T-TUBE INSERTION performed by Fabby Ash DO at [address]  2003    x2; hydrocelectomy    TONSILLECTOMY      TOTAL KNEE ARTHROPLASTY Right 01/07/2013    Right TKA  T.  Jose Antonio Crabtree MD     Family History Problem Relation Age of Onset    Stroke Mother     Osteoarthritis Mother     Coronary Art Dis Father     Pacemaker Father     Hypertension Father     No Known Problems Sister      Social History     Socioeconomic History    Marital status:      Spouse name: Not on file    Number of children: Not on file    Years of education: Not on file    Highest education level: Not on file   Occupational History    Occupation: retired , gasoline loads   Social Needs    Financial resource strain: Not on file    Food insecurity     Worry: Not on file     Inability: Not on file   [employer] needs     Medical: Not on file     Non-medical: Not on file   Tobacco Use    Smoking status: Never Smoker    Smokeless tobacco: Never Used   Substance and Sexual Activity    Alcohol use: No     Alcohol/week: 0.0 standard drinks    Drug use: No    Sexual activity: Yes     Partners: Female   Lifestyle    Physical activity     Days per week: Not on file     Minutes per session: Not on file    Stress: Not on file   Relationships    Social connections     Talks on phone: Not on file     Gets together: Not on file     Attends Muslim service: Not on file     Active member of club or organization: Not on file     Attends meetings of clubs or organizations: Not on file     Relationship status: Not on file    Intimate partner violence     Fear of current or ex partner: Not on file     Emotionally abused: Not on file     Physically abused: Not on file     Forced sexual activity: Not on file   Other Topics Concern    Not on file   Social History Narrative    Not on file       Vitals:    01/11/21 0802   BP: 138/82   Pulse: 79   Temp: 97.9 °F (36.6 °C)   SpO2: 96%   Weight: 254 lb (115.2 kg)   Height: 6' (1.829 m)       Physical Exam:  Physical Exam  Vitals signs and nursing note reviewed. Constitutional:       General: He is not in acute distress. Psychiatric:         Mood and Affect: Mood and affect normal.         Speech: Speech normal.         Behavior: Behavior normal.         Thought Content: Thought content normal.         Labs:  CBC with Differential:    Lab Results   Component Value Date    WBC 8.8 07/06/2020    RBC 4.55 07/06/2020    HGB 14.0 07/06/2020    HCT 42.7 07/06/2020     07/06/2020    MCV 93.8 07/06/2020    MCH 30.8 07/06/2020    MCHC 32.8 07/06/2020    RDW 12.9 07/06/2020    SEGSPCT 70 01/10/2013    LYMPHOPCT 17.8 07/06/2020    MONOPCT 5.6 07/06/2020    BASOPCT 1.1 07/06/2020    MONOSABS 0.49 07/06/2020    LYMPHSABS 1.56 07/06/2020    EOSABS 0.23 07/06/2020    BASOSABS 0.10 07/06/2020     CMP:    Lab Results   Component Value Date     07/06/2020    K 4.0 07/06/2020     07/06/2020    CO2 23 07/06/2020    BUN 16 07/06/2020    CREATININE 1.0 07/06/2020    GFRAA >60 07/06/2020    LABGLOM >60 07/06/2020    GLUCOSE 110 07/06/2020    PROT 6.7 07/06/2020    LABALBU 4.2 07/06/2020    CALCIUM 9.1 07/06/2020    BILITOT 0.6 07/06/2020    ALKPHOS 50 07/06/2020    AST 12 07/06/2020    ALT 10 07/06/2020     HgBA1c:    Lab Results   Component Value Date    LABA1C 5.6 07/06/2020     Microalbumen/Creatinine ratio:  No components found for: RUCREAT  FLP:    Lab Results   Component Value Date    TRIG 132 07/06/2020    HDL 34 07/06/2020    LDLCALC 94 07/06/2020    LABVLDL 26 07/06/2020     TSH:    Lab Results   Component Value Date    TSH 3.320 07/06/2020     PSA:   Lab Results   Component Value Date    PSA 4.54 11/19/2020        Assessment and Plan:  Patsy Forbes was seen today for hypertension. Diagnoses and all orders for this visit:    Essential hypertension  -     verapamil (VERELAN) 240 MG extended release capsule; Take 1 capsule by mouth nightly  -     lisinopril-hydroCHLOROthiazide (PRINZIDE;ZESTORETIC) 20-12.5 MG per tablet; Take 1 tablet by mouth daily  -     cloNIDine (CATAPRES) 0.1 MG tablet;  Take 2 tablets by mouth nightly -     CBC Auto Differential; Future  -     Comprehensive Metabolic Panel; Future  -     Lipid Panel; Future  -     TSH without Reflex; Future  -     Urinalysis; Future  Well controlled. Due for FBW and refills. Acquired hypothyroidism  Stable. Constipation resolved with Metamucil and nocturia resolved with Flomax. Postnasal drip  -     montelukast (SINGULAIR) 10 MG tablet; Take 1 tablet by mouth nightly  -     methylPREDNISolone (MEDROL DOSEPACK) 4 MG tablet; Take by mouth. Already using nasal steroid, astelin and anti-histamine. Will try Medrol dose pack to calm it down and then add singulair. Discussed Neti Pot and humidifier as well. Vitamin D insufficiency  -     Vitamin D 25 Hydroxy; Future    Impaired fasting glucose  -     Hemoglobin A1C; Future          Return in about 6 months (around 7/11/2021) for AWV.       Seen By:  Rakesh Hernandez, DO

## 2021-02-05 ENCOUNTER — PROCEDURE VISIT (OUTPATIENT)
Dept: AUDIOLOGY | Age: 71
End: 2021-02-05
Payer: MEDICARE

## 2021-02-05 ENCOUNTER — OFFICE VISIT (OUTPATIENT)
Dept: ENT CLINIC | Age: 71
End: 2021-02-05
Payer: MEDICARE

## 2021-02-05 VITALS — TEMPERATURE: 97.9 F | BODY MASS INDEX: 34.4 KG/M2 | HEIGHT: 72 IN | WEIGHT: 254 LBS

## 2021-02-05 DIAGNOSIS — H69.83 ETD (EUSTACHIAN TUBE DYSFUNCTION), BILATERAL: Primary | ICD-10-CM

## 2021-02-05 DIAGNOSIS — H69.83 CHRONIC DYSFUNCTION OF BOTH EUSTACHIAN TUBES: ICD-10-CM

## 2021-02-05 PROCEDURE — G8417 CALC BMI ABV UP PARAM F/U: HCPCS | Performed by: OTOLARYNGOLOGY

## 2021-02-05 PROCEDURE — 1036F TOBACCO NON-USER: CPT | Performed by: OTOLARYNGOLOGY

## 2021-02-05 PROCEDURE — G8482 FLU IMMUNIZE ORDER/ADMIN: HCPCS | Performed by: OTOLARYNGOLOGY

## 2021-02-05 PROCEDURE — G8427 DOCREV CUR MEDS BY ELIG CLIN: HCPCS | Performed by: OTOLARYNGOLOGY

## 2021-02-05 PROCEDURE — 92567 TYMPANOMETRY: CPT | Performed by: AUDIOLOGIST

## 2021-02-05 PROCEDURE — 4040F PNEUMOC VAC/ADMIN/RCVD: CPT | Performed by: OTOLARYNGOLOGY

## 2021-02-05 PROCEDURE — 99214 OFFICE O/P EST MOD 30 MIN: CPT | Performed by: OTOLARYNGOLOGY

## 2021-02-05 PROCEDURE — 1123F ACP DISCUSS/DSCN MKR DOCD: CPT | Performed by: OTOLARYNGOLOGY

## 2021-02-05 PROCEDURE — 3017F COLORECTAL CA SCREEN DOC REV: CPT | Performed by: OTOLARYNGOLOGY

## 2021-02-05 RX ORDER — OXYMETAZOLINE HYDROCHLORIDE 5 G/100ML
2 SPRAY NASAL 2 TIMES DAILY
Qty: 1 BOTTLE | Refills: 3 | Status: SHIPPED | OUTPATIENT
Start: 2021-02-05 | End: 2021-02-19

## 2021-02-05 NOTE — PROGRESS NOTES
[id number] [facility] Otolaryngology  Dr. Jatin Gardner. Zoie Kraft. Ms.Ed        Patient Name:  Jose Manuel Jason  :  1950     CHIEF C/O:    Chief Complaint   Patient presents with    Ear Problem     4 month tubes         HISTORY OBTAINED FROM:  patient    HISTORY OF PRESENT ILLNESS:       Gilda Alberto is a 72y.o. year old male, here today for follow up of bilateral stage II dysfunction with ear tubes in place, states his hearing is okay his left ear feels little bit muffled but otherwise no other complaints today of ear pain or drainage. Patient has a chronic history of nasal congestion postnasal drainage, he has been on singular, with minimal response. He states he has clear rhinorrhea without any focal mucoid drainage, no history of recent sinus infection requiring antibiotic therapy. No facial pain no maxillary sinus tenderness. No other complaints today vision changes hoarseness difficulty swallowing shortness of breath stridor neck mass. Past Medical History:   Diagnosis Date    BPH (benign prostatic hyperplasia)     Colon polyp 2018    Dr. Oumou WEINER (degenerative joint disease) of knee     Hypertension     Spinal stenosis, lumbar      Past Surgical History:   Procedure Laterality Date    CERVICAL LAMINECTOMY      LUMBAR LAMINECTOMY  2010    MYRINGOTOMY Right 2016    Nasal endoscopy with biopsy,biopsy post nasal pharynx    MYRINGOTOMY Right 2019    with right eustachian tube balloon dilation    MYRINGOTOMY AND TYMPANOSTOMY TUBE PLACEMENT Left 2016    Left myringotomy with T-tube insertion    MYRINGOTOMY AND TYMPANOSTOMY TUBE PLACEMENT Left 03/15/2018    Left myringotomy with tympanostomy tube placement     MYRINGOTOMY AND TYMPANOSTOMY TUBE PLACEMENT Right 2019    RIGHT MYRINGOTOMY TUBE REPLACEMENT.  RIGHT EUSTACHIAN TUBE DILATION performed by Sybil Sullivan DO at [address] [facility] Left 03/15/2018    Nasal endoscopy with eustachian tube balloon dilation, left.    [place] OFFICE/OUTPT VISIT,PROCEDURE ONLY Left 3/15/2018    LEFT EUSTACHIAN TUBE BALLOON DILATION AND LEFT MYRINGOTOMY WITH T-TUBE INSERTION performed by Rodo Cardoza DO at [address]  2003    x2; hydrocelectomy    TONSILLECTOMY      TOTAL KNEE ARTHROPLASTY Right 01/07/2013    Right TKA  JEWELL Padgett MD       Current Outpatient Medications:     verapamil (VERELAN) 240 MG extended release capsule, Take 1 capsule by mouth nightly, Disp: 90 capsule, Rfl: 1    lisinopril-hydroCHLOROthiazide (PRINZIDE;ZESTORETIC) 20-12.5 MG per tablet, Take 1 tablet by mouth daily, Disp: 90 tablet, Rfl: 1    cloNIDine (CATAPRES) 0.1 MG tablet, Take 2 tablets by mouth nightly, Disp: 180 tablet, Rfl: 1    montelukast (SINGULAIR) 10 MG tablet, Take 1 tablet by mouth nightly, Disp: 90 tablet, Rfl: 1    tamsulosin (FLOMAX) 0.4 MG capsule, TAKE 1 CAPSULE BY MOUTH DAILY, Disp: 90 capsule, Rfl: 1    levothyroxine (SYNTHROID) 25 MCG tablet, TAKE 1 TABLET BY MOUTH DAILY, Disp: 90 tablet, Rfl: 1    azelastine (ASTELIN) 0.1 % nasal spray, 2 sprays by Nasal route 2 times daily Use in each nostril as directed, Disp: 1 Bottle, Rfl: 1    fluticasone (FLONASE) 50 MCG/ACT nasal spray, SHAKE LIQUID AND USE 2 SPRAYS IN EACH NOSTRIL EVERY DAY, Disp: 48 g, Rfl: 3    sulfamethoxazole-trimethoprim (BACTRIM DS;SEPTRA DS) 800-160 MG per tablet, Take 1 tablet by mouth 2 times daily for 7 days, Disp: 14 tablet, Rfl: 0  Other  Social History     Tobacco Use    Smoking status: Never Smoker    Smokeless tobacco: Never Used   Substance Use Topics    Alcohol use: No     Alcohol/week: 0.0 standard drinks    Drug use: No     Family History   Problem Relation Age of Onset    Stroke Mother     Osteoarthritis Mother     Coronary Art Dis Father     Pacemaker Father     Hypertension Father     No Known Problems Sister        Review of Systems   Constitutional: Negative for chills and fever.    HENT: Positive for congestion, postnasal drip and rhinorrhea. Negative for ear discharge, hearing loss, sinus pressure, sneezing and voice change. Respiratory: Negative for cough and shortness of breath. Cardiovascular: Negative for chest pain and palpitations. Gastrointestinal: Negative for vomiting. Skin: Negative for rash. Allergic/Immunologic: Negative for environmental allergies. Neurological: Negative for dizziness and headaches. Hematological: Does not bruise/bleed easily. All other systems reviewed and are negative. Temp 97.9 °F (36.6 °C)   Ht 6' (1.829 m)   Wt 254 lb (115.2 kg)   BMI 34.45 kg/m²   Physical Exam  Vitals signs and nursing note reviewed. Constitutional:       Appearance: He is well-developed. HENT:      Head: Normocephalic and atraumatic. Right Ear: Tympanic membrane and ear canal normal. There is no impacted cerumen. Left Ear: Tympanic membrane and ear canal normal.      Nose: Congestion present. No rhinorrhea. Mouth/Throat:      Mouth: Mucous membranes are moist.      Pharynx: Oropharynx is clear. No oropharyngeal exudate. Eyes:      Pupils: Pupils are equal, round, and reactive to light. Neck:      Musculoskeletal: Normal range of motion. Thyroid: No thyromegaly. Trachea: No tracheal deviation. Cardiovascular:      Rate and Rhythm: Normal rate. Pulmonary:      Effort: Pulmonary effort is normal. No respiratory distress. Musculoskeletal: Normal range of motion. Lymphadenopathy:      Cervical: No cervical adenopathy. Skin:     General: Skin is warm. Findings: No erythema. Neurological:      Mental Status: He is alert. Cranial Nerves: No cranial nerve deficit.          IMPRESSION/PLAN:  Patient seen exam pending history of eustachian tube dysfunction, recommendations patient to use drops in the left ear for 3 days to allow for cleaning of any wax in the ear canal.    Patient has a history of chronic allergic rhinitis possibly vasomotor nature of refractory to Singulair, recommend the patient start on Astelin as well as intermittent Mucinex as needed. Patient follow-up in 4 to 6 weeks with progress with regards to his chronic rhinitis. Dr. Chikis Frost D.O.  Ms. Vi Castillo.  Otolaryngology Facial Plastic Surgery  :  [facility] Otolaryngology/Facial Plastic Surgery Residency  Associate Clinical Professor:  DOLLY Fox  [facility]

## 2021-02-17 ENCOUNTER — OFFICE VISIT (OUTPATIENT)
Dept: PRIMARY CARE CLINIC | Age: 71
End: 2021-02-17
Payer: MEDICARE

## 2021-02-17 VITALS
BODY MASS INDEX: 34.4 KG/M2 | OXYGEN SATURATION: 98 % | WEIGHT: 254 LBS | HEART RATE: 72 BPM | HEIGHT: 72 IN | DIASTOLIC BLOOD PRESSURE: 94 MMHG | TEMPERATURE: 97.7 F | SYSTOLIC BLOOD PRESSURE: 168 MMHG

## 2021-02-17 DIAGNOSIS — R97.20 ELEVATED PSA: ICD-10-CM

## 2021-02-17 DIAGNOSIS — R31.0 GROSS HEMATURIA: ICD-10-CM

## 2021-02-17 DIAGNOSIS — R31.0 GROSS HEMATURIA: Primary | ICD-10-CM

## 2021-02-17 LAB
BILIRUBIN, POC: NORMAL
BLOOD URINE, POC: NORMAL
CLARITY, POC: CLEAR
COLOR, POC: NORMAL
GLUCOSE URINE, POC: NEGATIVE
KETONES, POC: NEGATIVE
LEUKOCYTE EST, POC: NEGATIVE
NITRITE, POC: NEGATIVE
PH, POC: 7
PROTEIN, POC: NEGATIVE
SPECIFIC GRAVITY, POC: 1.01
UROBILINOGEN, POC: 0.2

## 2021-02-17 PROCEDURE — 81002 URINALYSIS NONAUTO W/O SCOPE: CPT | Performed by: FAMILY MEDICINE

## 2021-02-17 PROCEDURE — 3017F COLORECTAL CA SCREEN DOC REV: CPT | Performed by: FAMILY MEDICINE

## 2021-02-17 PROCEDURE — 99213 OFFICE O/P EST LOW 20 MIN: CPT | Performed by: FAMILY MEDICINE

## 2021-02-17 PROCEDURE — 1036F TOBACCO NON-USER: CPT | Performed by: FAMILY MEDICINE

## 2021-02-17 PROCEDURE — 1123F ACP DISCUSS/DSCN MKR DOCD: CPT | Performed by: FAMILY MEDICINE

## 2021-02-17 PROCEDURE — G8417 CALC BMI ABV UP PARAM F/U: HCPCS | Performed by: FAMILY MEDICINE

## 2021-02-17 PROCEDURE — G8427 DOCREV CUR MEDS BY ELIG CLIN: HCPCS | Performed by: FAMILY MEDICINE

## 2021-02-17 PROCEDURE — 4040F PNEUMOC VAC/ADMIN/RCVD: CPT | Performed by: FAMILY MEDICINE

## 2021-02-17 PROCEDURE — G8482 FLU IMMUNIZE ORDER/ADMIN: HCPCS | Performed by: FAMILY MEDICINE

## 2021-02-17 RX ORDER — SULFAMETHOXAZOLE AND TRIMETHOPRIM 800; 160 MG/1; MG/1
1 TABLET ORAL 2 TIMES DAILY
Qty: 14 TABLET | Refills: 0 | Status: SHIPPED | OUTPATIENT
Start: 2021-02-17 | End: 2021-02-24

## 2021-02-17 ASSESSMENT — PATIENT HEALTH QUESTIONNAIRE - PHQ9
SUM OF ALL RESPONSES TO PHQ9 QUESTIONS 1 & 2: 0
1. LITTLE INTEREST OR PLEASURE IN DOING THINGS: 0
SUM OF ALL RESPONSES TO PHQ QUESTIONS 1-9: 0
SUM OF ALL RESPONSES TO PHQ QUESTIONS 1-9: 0

## 2021-02-17 ASSESSMENT — ENCOUNTER SYMPTOMS
WHEEZING: 0
ABDOMINAL PAIN: 0
COUGH: 0
VOMITING: 0
BACK PAIN: 0
CONSTIPATION: 0
DIARRHEA: 0
NAUSEA: 0
SHORTNESS OF BREATH: 0

## 2021-02-17 NOTE — PROGRESS NOTES
21  Khloe Andrea : 1950 Sex: male  Age: 72 y.o. Chief Complaint   Patient presents with    Hematuria     no pain     HPI:  72 y.o. male presents for acute visit due to hematuria. Urinary Tract Infection  Patient complains of hematuria- states that his urine was \"cola colored\" yesterday, slightly improved today. He has had symptoms for 2 days. Patient denies back pain, fever, dysuria, frequency and stomach ache. Patient does not have a history of recurrent UTI. Patient does not have a history of pyelonephritis. ROS:  Review of Systems   Constitutional: Negative for chills, fatigue and fever. Respiratory: Negative for cough, shortness of breath and wheezing. Cardiovascular: Negative for chest pain and palpitations. Gastrointestinal: Negative for abdominal pain, constipation, diarrhea, nausea and vomiting. Genitourinary: Positive for hematuria. Negative for decreased urine volume, difficulty urinating, discharge, dysuria, flank pain, frequency and urgency. Musculoskeletal: Negative for arthralgias and back pain. Skin: Negative for rash. Neurological: Negative for dizziness and headaches. Psychiatric/Behavioral: Negative for dysphoric mood. The patient is not nervous/anxious. All other systems reviewed and are negative.        Current Outpatient Medications on File Prior to Visit   Medication Sig Dispense Refill    oxymetazoline (12 HOUR NASAL SPRAY) 0.05 % nasal spray 2 sprays by Nasal route 2 times daily for 14 days 1 Bottle 3    verapamil (VERELAN) 240 MG extended release capsule Take 1 capsule by mouth nightly 90 capsule 1    lisinopril-hydroCHLOROthiazide (PRINZIDE;ZESTORETIC) 20-12.5 MG per tablet Take 1 tablet by mouth daily 90 tablet 1    cloNIDine (CATAPRES) 0.1 MG tablet Take 2 tablets by mouth nightly 180 tablet 1    montelukast (SINGULAIR) 10 MG tablet Take 1 tablet by mouth nightly 90 tablet 1    tamsulosin (FLOMAX) 0.4 MG capsule TAKE 1 CAPSULE BY MOUTH DAILY 90 capsule 1    levothyroxine (SYNTHROID) 25 MCG tablet TAKE 1 TABLET BY MOUTH DAILY 90 tablet 1    azelastine (ASTELIN) 0.1 % nasal spray 2 sprays by Nasal route 2 times daily Use in each nostril as directed 1 Bottle 1    fluticasone (FLONASE) 50 MCG/ACT nasal spray SHAKE LIQUID AND USE 2 SPRAYS IN EACH NOSTRIL EVERY DAY 48 g 3    [DISCONTINUED] tamsulosin (FLOMAX) 0.4 MG capsule Take 1 capsule by mouth daily 90 capsule 1     No current facility-administered medications on file prior to visit. Allergies   Allergen Reactions    Other      SEASONAL       Past Medical History:   Diagnosis Date    BPH (benign prostatic hyperplasia)     Colon polyp 01/2018    Dr. Josh WEINER (degenerative joint disease) of knee     Hypertension     Spinal stenosis, lumbar      Past Surgical History:   Procedure Laterality Date    CERVICAL LAMINECTOMY  1989    LUMBAR LAMINECTOMY  2010    MYRINGOTOMY Right 01/14/2016    Nasal endoscopy with biopsy,biopsy post nasal pharynx    MYRINGOTOMY Right 01/24/2019    with right eustachian tube balloon dilation    MYRINGOTOMY AND TYMPANOSTOMY TUBE PLACEMENT Left 12/08/2016    Left myringotomy with T-tube insertion    MYRINGOTOMY AND TYMPANOSTOMY TUBE PLACEMENT Left 03/15/2018    Left myringotomy with tympanostomy tube placement     MYRINGOTOMY AND TYMPANOSTOMY TUBE PLACEMENT Right 1/24/2019    RIGHT MYRINGOTOMY TUBE REPLACEMENT. RIGHT EUSTACHIAN TUBE DILATION performed by Maru Pelaez DO at [address] [place] [address] Left 03/15/2018    Nasal endoscopy with eustachian tube balloon dilation, left.  DE OFFICE/OUTPT VISIT,PROCEDURE ONLY Left 3/15/2018    LEFT EUSTACHIAN TUBE BALLOON DILATION AND LEFT MYRINGOTOMY WITH T-TUBE INSERTION performed by Maru Pelaez DO at [facility]  2003    x2; hydrocelectomy    TONSILLECTOMY      TOTAL KNEE ARTHROPLASTY Right 01/07/2013    Right TKA  T.  Faviola Ayala MD     Family History   Problem Relation Age of Onset    Stroke Mother     Osteoarthritis Mother     Coronary Art Dis Father     Pacemaker Father     Hypertension Father     No Known Problems Sister      Social History     Tobacco History     Smoking Status  Never Smoker    Smokeless Tobacco Use  Never Used                 Vitals:    02/17/21 1148   BP: (!) 168/94   Pulse: 72   Temp: 97.7 °F (36.5 °C)   SpO2: 98%   Weight: 254 lb (115.2 kg)   Height: 6' (1.829 m)       Physical Exam:  Physical Exam  Vitals signs and nursing note reviewed. Constitutional:       General: He is not in acute distress. Appearance: Normal appearance. He is well-developed. He is obese. He is not ill-appearing. HENT:      Head: Normocephalic and atraumatic. Right Ear: Hearing and external ear normal.      Left Ear: Hearing and external ear normal.      Nose:      Comments: Patient wearing mask  Eyes:      General: Lids are normal. No scleral icterus. Right eye: No discharge. Left eye: No discharge. Extraocular Movements: Extraocular movements intact. Conjunctiva/sclera: Conjunctivae normal.   Neck:      Musculoskeletal: Normal range of motion and neck supple. Thyroid: No thyromegaly. Vascular: No carotid bruit. Cardiovascular:      Rate and Rhythm: Normal rate and regular rhythm. Heart sounds: Normal heart sounds. No murmur. Pulmonary:      Effort: Pulmonary effort is normal. No respiratory distress. Breath sounds: Normal breath sounds. No wheezing. Musculoskeletal: Normal range of motion. General: No tenderness or deformity. Right lower leg: No edema. Left lower leg: No edema. Lymphadenopathy:      Cervical: No cervical adenopathy. Skin:     General: Skin is warm and dry. Findings: No rash. Neurological:      General: No focal deficit present. Mental Status: He is alert and oriented to person, place, and time.       Gait: Gait normal.   Psychiatric: Mood and Affect: Mood and affect normal.         Speech: Speech normal.         Behavior: Behavior normal.         Thought Content: Thought content normal.       Results for orders placed or performed in visit on 02/17/21   POCT Urinalysis no Micro   Result Value Ref Range    Color, UA pink     Clarity, UA clear     Glucose, UA POC negative     Bilirubin, UA negataive     Ketones, UA negative     Spec Grav, UA 1.015     Blood, UA POC large     pH, UA 7.0     Protein, UA POC negative     Urobilinogen, UA 0.2     Leukocytes, UA negative     Nitrite, UA negative         Assessment and Plan:  Jony Abreu was seen today for hematuria. Diagnoses and all orders for this visit:    Gross hematuria  -     POCT Urinalysis no Micro  -     sulfamethoxazole-trimethoprim (BACTRIM DS;SEPTRA DS) 800-160 MG per tablet; Take 1 tablet by mouth 2 times daily for 7 days  -     Urinalysis; Future  -     Culture, Urine; Future    Elevated PSA    Patient not having any other signs of infection or stone. Does have a slightly higher PSA in November than the previous year. Follows with Erna. Will treat with antibiotics first and then recheck urine in 1 week. If not cleared, will have him call Er[facility] for eval.    Return in about 1 week (around 2/24/2021) for Recheck.       Seen By:  Lily Ratliff DO

## 2021-02-19 ENCOUNTER — APPOINTMENT (OUTPATIENT)
Dept: CT IMAGING | Age: 71
End: 2021-02-19
Payer: MEDICARE

## 2021-02-19 ENCOUNTER — HOSPITAL ENCOUNTER (EMERGENCY)
Age: 71
Discharge: HOME | End: 2021-02-19
Attending: EMERGENCY MEDICINE
Payer: MEDICARE

## 2021-02-19 VITALS
WEIGHT: 250 LBS | TEMPERATURE: 96.3 F | SYSTOLIC BLOOD PRESSURE: 172 MMHG | DIASTOLIC BLOOD PRESSURE: 81 MMHG | RESPIRATION RATE: 18 BRPM | BODY MASS INDEX: 32.08 KG/M2 | OXYGEN SATURATION: 98 % | HEIGHT: 74 IN | HEART RATE: 75 BPM

## 2021-02-19 DIAGNOSIS — R10.9 LEFT FLANK PAIN: Primary | ICD-10-CM

## 2021-02-19 DIAGNOSIS — N20.0 KIDNEY STONE: ICD-10-CM

## 2021-02-19 LAB
ANION GAP SERPL CALCULATED.3IONS-SCNC: 10 MMOL/L (ref 7–16)
BACTERIA: ABNORMAL /HPF
BASOPHILS ABSOLUTE: 0.1 E9/L (ref 0–0.2)
BASOPHILS RELATIVE PERCENT: 0.7 % (ref 0–2)
BILIRUBIN URINE: NEGATIVE
BLOOD, URINE: ABNORMAL
BUN BLDV-MCNC: 15 MG/DL (ref 8–23)
CALCIUM SERPL-MCNC: 9.3 MG/DL (ref 8.6–10.2)
CHLORIDE BLD-SCNC: 104 MMOL/L (ref 98–107)
CLARITY: CLEAR
CO2: 25 MMOL/L (ref 22–29)
COLOR: YELLOW
CREAT SERPL-MCNC: 1.6 MG/DL (ref 0.7–1.2)
EOSINOPHILS ABSOLUTE: 0.06 E9/L (ref 0.05–0.5)
EOSINOPHILS RELATIVE PERCENT: 0.4 % (ref 0–6)
GFR AFRICAN AMERICAN: 52
GFR NON-AFRICAN AMERICAN: 43 ML/MIN/1.73
GLUCOSE BLD-MCNC: 112 MG/DL (ref 74–99)
GLUCOSE URINE: NEGATIVE MG/DL
HCT VFR BLD CALC: 42.8 % (ref 37–54)
HEMOGLOBIN: 14.3 G/DL (ref 12.5–16.5)
IMMATURE GRANULOCYTES #: 0.08 E9/L
IMMATURE GRANULOCYTES %: 0.5 % (ref 0–5)
KETONES, URINE: NEGATIVE MG/DL
LEUKOCYTE ESTERASE, URINE: NEGATIVE
LIPASE: 21 U/L (ref 13–60)
LYMPHOCYTES ABSOLUTE: 1.22 E9/L (ref 1.5–4)
LYMPHOCYTES RELATIVE PERCENT: 8.2 % (ref 20–42)
MCH RBC QN AUTO: 30.4 PG (ref 26–35)
MCHC RBC AUTO-ENTMCNC: 33.4 % (ref 32–34.5)
MCV RBC AUTO: 90.9 FL (ref 80–99.9)
MONOCYTES ABSOLUTE: 1.06 E9/L (ref 0.1–0.95)
MONOCYTES RELATIVE PERCENT: 7.1 % (ref 2–12)
NEUTROPHILS ABSOLUTE: 12.38 E9/L (ref 1.8–7.3)
NEUTROPHILS RELATIVE PERCENT: 83.1 % (ref 43–80)
NITRITE, URINE: NEGATIVE
PDW BLD-RTO: 13.6 FL (ref 11.5–15)
PH UA: 6 (ref 5–9)
PLATELET # BLD: 237 E9/L (ref 130–450)
PMV BLD AUTO: 10.2 FL (ref 7–12)
POTASSIUM REFLEX MAGNESIUM: 4.1 MMOL/L (ref 3.5–5)
PROTEIN UA: NEGATIVE MG/DL
RBC # BLD: 4.71 E12/L (ref 3.8–5.8)
RBC UA: ABNORMAL /HPF (ref 0–2)
SODIUM BLD-SCNC: 139 MMOL/L (ref 132–146)
SPECIFIC GRAVITY UA: 1.02 (ref 1–1.03)
UROBILINOGEN, URINE: 0.2 E.U./DL
WBC # BLD: 14.9 E9/L (ref 4.5–11.5)
WBC UA: ABNORMAL /HPF (ref 0–5)

## 2021-02-19 PROCEDURE — 2580000003 HC RX 258: Performed by: FAMILY MEDICINE

## 2021-02-19 PROCEDURE — 74176 CT ABD & PELVIS W/O CONTRAST: CPT

## 2021-02-19 PROCEDURE — 6360000002 HC RX W HCPCS: Performed by: FAMILY MEDICINE

## 2021-02-19 PROCEDURE — 96375 TX/PRO/DX INJ NEW DRUG ADDON: CPT

## 2021-02-19 PROCEDURE — 81001 URINALYSIS AUTO W/SCOPE: CPT

## 2021-02-19 PROCEDURE — 85025 COMPLETE CBC W/AUTO DIFF WBC: CPT

## 2021-02-19 PROCEDURE — 87088 URINE BACTERIA CULTURE: CPT

## 2021-02-19 PROCEDURE — 80048 BASIC METABOLIC PNL TOTAL CA: CPT

## 2021-02-19 PROCEDURE — 96374 THER/PROPH/DIAG INJ IV PUSH: CPT

## 2021-02-19 PROCEDURE — 83690 ASSAY OF LIPASE: CPT

## 2021-02-19 PROCEDURE — 99281 EMR DPT VST MAYX REQ PHY/QHP: CPT

## 2021-02-19 PROCEDURE — 99282 EMERGENCY DEPT VISIT SF MDM: CPT

## 2021-02-19 RX ORDER — ONDANSETRON 2 MG/ML
4 INJECTION INTRAMUSCULAR; INTRAVENOUS EVERY 6 HOURS PRN
Status: DISCONTINUED | OUTPATIENT
Start: 2021-02-19 | End: 2021-02-19 | Stop reason: HOSPADM

## 2021-02-19 RX ORDER — KETOROLAC TROMETHAMINE 30 MG/ML
15 INJECTION, SOLUTION INTRAMUSCULAR; INTRAVENOUS ONCE
Status: COMPLETED | OUTPATIENT
Start: 2021-02-19 | End: 2021-02-19

## 2021-02-19 RX ORDER — OXYCODONE HYDROCHLORIDE AND ACETAMINOPHEN 5; 325 MG/1; MG/1
1 TABLET ORAL EVERY 6 HOURS PRN
Qty: 12 TABLET | Refills: 0 | Status: SHIPPED | OUTPATIENT
Start: 2021-02-19 | End: 2021-02-22

## 2021-02-19 RX ORDER — MORPHINE SULFATE 4 MG/ML
4 INJECTION, SOLUTION INTRAMUSCULAR; INTRAVENOUS ONCE
Status: DISCONTINUED | OUTPATIENT
Start: 2021-02-19 | End: 2021-02-19

## 2021-02-19 RX ORDER — 0.9 % SODIUM CHLORIDE 0.9 %
1000 INTRAVENOUS SOLUTION INTRAVENOUS ONCE
Status: COMPLETED | OUTPATIENT
Start: 2021-02-19 | End: 2021-02-19

## 2021-02-19 RX ORDER — MORPHINE SULFATE 4 MG/ML
4 INJECTION, SOLUTION INTRAMUSCULAR; INTRAVENOUS ONCE
Status: COMPLETED | OUTPATIENT
Start: 2021-02-19 | End: 2021-02-19

## 2021-02-19 RX ADMIN — KETOROLAC TROMETHAMINE 15 MG: 30 INJECTION, SOLUTION INTRAMUSCULAR at 17:50

## 2021-02-19 RX ADMIN — ONDANSETRON 4 MG: 2 INJECTION INTRAMUSCULAR; INTRAVENOUS at 17:05

## 2021-02-19 RX ADMIN — MORPHINE SULFATE 4 MG: 4 INJECTION, SOLUTION INTRAMUSCULAR; INTRAVENOUS at 17:05

## 2021-02-19 RX ADMIN — SODIUM CHLORIDE 1000 ML: 9 INJECTION, SOLUTION INTRAVENOUS at 17:05

## 2021-02-19 ASSESSMENT — ENCOUNTER SYMPTOMS
DIARRHEA: 0
NAUSEA: 1
ABDOMINAL PAIN: 1
RHINORRHEA: 0
SORE THROAT: 0
SHORTNESS OF BREATH: 0
BACK PAIN: 1
COUGH: 0
VOMITING: 0

## 2021-02-19 NOTE — ED PROVIDER NOTES
ATTENDING PROVIDER ATTESTATION:     Mainor Hinojosa presented to the emergency department for evaluation of Flank Pain (sharp pain sudden onset, hx stones. ), Hematuria (seen PCP on monday and started on ABX for uti), and Dysuria   and was initially evaluated by the Medical Resident. See Original ED Note for H&P and ED course above. I have reviewed and discussed the case, including pertinent history (medical, surgical, family and social) and exam findings with the Medical Resident assigned to Mainor Hinojosa. I have personally performed and/or participated in the history, exam, medical decision making, and procedures and agree with all pertinent clinical information. Patient is a 70-year-old male presenting with flank pain. Patient pain free on my exam with no abdominal tenderness. No fevers or evidence of sepsis. Leukocytosis likely reactive. Mild creatinine elevation consistent with patient kidney stone. UA does not appear infected. Numerous incidental findings discussed with the patient and his wife. Advised outpatient PCP follow up for further monitoring. Supportive care instructions and strict ED return precautions discussed. Given urology referral.     I, Dr. Boni Nieto MD, am the primary provider of this record. I have reviewed my findings and recommendations with the assigned Medical Resident, Mainor Hinojosa and members of family present at the time of disposition. My findings/plan: The primary encounter diagnosis was Left flank pain. A diagnosis of Kidney stone was also pertinent to this visit. Discharge Medication List as of 2/19/2021  6:54 PM      START taking these medications    Details   oxyCODONE-acetaminophen (PERCOCET) 5-325 MG per tablet Take 1 tablet by mouth every 6 hours as needed for Pain for up to 3 days. Intended supply: 3 days.  Take lowest dose possible to manage pain, Disp-12 tablet, R-0Normal           Boni Nieto MD      ED Provider Note  02/19/21 Patient: Víctor Elliott  Age: 72 y.o. Sex: male    Subjective:  Chief Complaint   Patient presents with    Flank Pain     sharp pain sudden onset, hx stones.  Hematuria     seen PCP on monday and started on ABX for uti    Dysuria     HPI  Patient states that since Wednesday he has been having visible hematuria. He saw his primary care physician who diagnosed him with a UTI and sent him home with an antibiotic. Today at 11 AM he began developing left-sided flank pain. Patient has a history of kidney stones, with 3 prior episodes. Patient states the pain started suddenly and has been unchanged since it came on. He rates it as a 9 out of 10. This pain feels similar to his prior episodes of kidney stones. The history is provided by the patient and the spouse. Abdominal Pain  Pain location:  LLQ  Pain quality: stabbing    Pain radiates to:  Back  Pain severity:  Severe  Onset quality:  Sudden  Timing:  Constant  Progression:  Unchanged  Chronicity:  Recurrent  Relieved by:  Nothing  Worsened by:  Vomiting  Associated symptoms: hematuria and nausea    Associated symptoms: no chest pain, no chills, no cough, no diarrhea, no dysuria, no fever, no shortness of breath, no sore throat and no vomiting    Risk factors: being elderly and obesity       Review of Systems   Constitutional: Negative for chills and fever. HENT: Negative for congestion, rhinorrhea and sore throat. Respiratory: Negative for cough and shortness of breath. Cardiovascular: Negative for chest pain and palpitations. Gastrointestinal: Positive for abdominal pain and nausea. Negative for diarrhea and vomiting. Genitourinary: Positive for decreased urine volume, difficulty urinating (dribbling), flank pain and hematuria. Negative for dysuria. Musculoskeletal: Positive for back pain (low back, left). Negative for arthralgias and myalgias. Neurological: Negative for dizziness and headaches. All other systems reviewed and are negative. Objective:  Physical Exam  Vitals signs (appears uncomfortable) and nursing note reviewed. Constitutional:       General: He is not in acute distress. Appearance: He is obese. HENT:      Head: Normocephalic and atraumatic. Nose: Nose normal.      Mouth/Throat:      Mouth: Mucous membranes are moist.   Eyes:      General:         Right eye: No discharge. Left eye: No discharge. Extraocular Movements: Extraocular movements intact. Pupils: Pupils are equal, round, and reactive to light. Neck:      Musculoskeletal: Normal range of motion and neck supple. Cardiovascular:      Rate and Rhythm: Normal rate and regular rhythm. Pulmonary:      Effort: Pulmonary effort is normal.      Breath sounds: Normal breath sounds. Abdominal:      General: Bowel sounds are normal.      Palpations: Abdomen is soft. Tenderness: There is abdominal tenderness (mild, RLQ). There is no right CVA tenderness or left CVA tenderness. Musculoskeletal:         General: No tenderness. Right lower leg: No edema. Left lower leg: No edema. Skin:     General: Skin is warm and dry. Neurological:      Mental Status: He is alert and oriented to person, place, and time. Mental status is at baseline.    Psychiatric:         Mood and Affect: Mood normal.         Behavior: Behavior normal.        Procedures     MDM  Number of Diagnoses or Management Options  Kidney stone  Left flank pain Diagnosis management comments: Patient is a 76-year-old male who presented with left flank pain. Blood work significant for elevated creatinine, and elevated WBC count. UA remarkable for large blood with rare bacteria. Imaging remarkable for a 5 mm obstructing stone in the left ureteropelvic junction. There was also a 2 mm stone in the right kidney which was nonobstructing. CT also showed possible diverticulitis, several hernias, and an enlarged prostate. Patient's pain was controlled. Patient was deemed safe for discharge and was sent home with Percocet for a few days and a referral to urology. Patient was already on Flomax. Amount and/or Complexity of Data Reviewed  Clinical lab tests: reviewed  Tests in the radiology section of CPT®: reviewed  Decide to obtain previous medical records or to obtain history from someone other than the patient: yes       ED Course as of Feb 19 1833 Fri Feb 19, 2021 1720 Spoke with patient; states his pain is not controlled on current dose of morphine. This was given just a few minutes ago. Will reassess in a few minutes to allow medication time to work.    [SB]   1722 WBC(!): 14.9 [SB]   1723 Blood, Urine(!): LARGE [SB]   1724 Two kidney stones, as well as several additional findings. CT ABDOMEN PELVIS WO CONTRAST Additional Contrast? None [SB]   1741 Patient's pain remains uncontrolled. Toradol ordered. [SB]   1754 Creatinine(!): 1.6 [SB]   1809 Reevaluated patient. States his pain is much better. Now a 3/10. [SB]   1810 Hemoglobin Quant: 14.3 [SB]   5478 Patient has no tenderness on my exam. Pain resolved with toradol. Diverticulitis not suspected. No evidence of sepsis.     [JA]      ED Course User Index  [JA] Mark Green MD  [SB] Cecy Corbett, DO       --------------------------------------------- PAST HISTORY --------------------------------------------- Past Medical History:  has a past medical history of BPH (benign prostatic hyperplasia), Colon polyp, DJD (degenerative joint disease) of knee, Hypertension, and Spinal stenosis, lumbar. Past Surgical History:  has a past surgical history that includes cervical laminectomy (1989); lumbar laminectomy (2010); Testicle surgery (2003); Tonsillectomy; Total knee arthroplasty (Right, 01/07/2013); myringotomy (Right, 01/14/2016); Myringotomy Tympanostomy Tube Placement (Left, 12/08/2016); pr office/outpt visit,procedure only (Left, 3/15/2018); Myringotomy Tympanostomy Tube Placement (Left, 03/15/2018); nasal endoscopy (Left, 03/15/2018); myringotomy (Right, 01/24/2019); and Myringotomy Tympanostomy Tube Placement (Right, 1/24/2019). Social History:  reports that he has never smoked. He has never used smokeless tobacco. He reports that he does not drink alcohol or use drugs. Family History: family history includes Coronary Art Dis in his father; Hypertension in his father; No Known Problems in his sister; Osteoarthritis in his mother; Pacemaker in his father; Stroke in his mother. The patients home medications have been reviewed. Allergies:  Other    -------------------------------------------------- RESULTS -------------------------------------------------  Labs:  Results for orders placed or performed during the hospital encounter of 02/19/21   CBC Auto Differential   Result Value Ref Range    WBC 14.9 (H) 4.5 - 11.5 E9/L    RBC 4.71 3.80 - 5.80 E12/L    Hemoglobin 14.3 12.5 - 16.5 g/dL    Hematocrit 42.8 37.0 - 54.0 %    MCV 90.9 80.0 - 99.9 fL    MCH 30.4 26.0 - 35.0 pg    MCHC 33.4 32.0 - 34.5 %    RDW 13.6 11.5 - 15.0 fL    Platelets 346 526 - 131 E9/L    MPV 10.2 7.0 - 12.0 fL    Neutrophils % 83.1 (H) 43.0 - 80.0 %    Immature Granulocytes % 0.5 0.0 - 5.0 %    Lymphocytes % 8.2 (L) 20.0 - 42.0 %    Monocytes % 7.1 2.0 - 12.0 %    Eosinophils % 0.4 0.0 - 6.0 %    Basophils % 0.7 0.0 - 2.0 % Enlarged prostate gland, bilateral inguinal hernias and small fat containing   umbilical hernia.          ------------------------- NURSING NOTES AND VITALS REVIEWED ---------------------------  Date / Time Roomed:  2/19/2021  4:05 PM  ED Bed Assignment:  09/09    The nursing notes within the ED encounter and vital signs as below have been reviewed. BP (!) 172/81   Pulse 75   Temp 96.3 °F (35.7 °C)   Resp 18   Ht 6' 2\" (1.88 m)   Wt 250 lb (113.4 kg)   SpO2 98%   BMI 32.10 kg/m²   Oxygen Saturation Interpretation: Normal      ------------------------------------------ PROGRESS NOTES ------------------------------------------  6:15 PM EST  I have spoken with the their and discussed todays results, in addition to providing specific details for the plan of care and counseling regarding the diagnosis and prognosis. Their questions are answered at this time and they are agreeable with the plan. I discussed at length with them reasons for immediate return here for re evaluation. They will followup with their referred urologist and primary care physician by calling their office on Monday.      --------------------------------- ADDITIONAL PROVIDER NOTES ---------------------------------  At this time the patient is without objective evidence of an acute process requiring hospitalization or inpatient management. They have remained hemodynamically stable throughout their entire ED visit and are stable for discharge with outpatient follow-up. The plan has been discussed in detail and they are aware of the specific conditions for emergent return, as well as the importance of follow-up. New Prescriptions    No medications on file       Diagnosis:  1. Left flank pain    2. Kidney stone        Disposition:  Patient's disposition: Discharge to home  Patient's condition is stable.        Diana Moreira DO  Resident  02/19/21 2005       Al Hsu MD  02/21/21 1640

## 2021-02-20 LAB — URINE CULTURE, ROUTINE: NORMAL

## 2021-02-21 LAB — URINE CULTURE, ROUTINE: NORMAL

## 2021-02-23 ASSESSMENT — ENCOUNTER SYMPTOMS
SINUS PRESSURE: 0
RHINORRHEA: 1
VOMITING: 0
VOICE CHANGE: 0
SHORTNESS OF BREATH: 0
COUGH: 0

## 2021-03-10 ENCOUNTER — OFFICE VISIT (OUTPATIENT)
Dept: ENT CLINIC | Age: 71
End: 2021-03-10
Payer: MEDICARE

## 2021-03-10 VITALS
HEIGHT: 74 IN | WEIGHT: 246 LBS | SYSTOLIC BLOOD PRESSURE: 173 MMHG | BODY MASS INDEX: 31.57 KG/M2 | DIASTOLIC BLOOD PRESSURE: 86 MMHG | HEART RATE: 72 BPM

## 2021-03-10 DIAGNOSIS — H69.83 ETD (EUSTACHIAN TUBE DYSFUNCTION), BILATERAL: Primary | ICD-10-CM

## 2021-03-10 DIAGNOSIS — R09.82 PND (POST-NASAL DRIP): ICD-10-CM

## 2021-03-10 DIAGNOSIS — J30.0 VASOMOTOR RHINITIS: ICD-10-CM

## 2021-03-10 DIAGNOSIS — J30.89 SEASONAL ALLERGIC RHINITIS DUE TO OTHER ALLERGIC TRIGGER: ICD-10-CM

## 2021-03-10 PROCEDURE — 3017F COLORECTAL CA SCREEN DOC REV: CPT | Performed by: OTOLARYNGOLOGY

## 2021-03-10 PROCEDURE — 99213 OFFICE O/P EST LOW 20 MIN: CPT | Performed by: OTOLARYNGOLOGY

## 2021-03-10 PROCEDURE — 4040F PNEUMOC VAC/ADMIN/RCVD: CPT | Performed by: OTOLARYNGOLOGY

## 2021-03-10 PROCEDURE — G8417 CALC BMI ABV UP PARAM F/U: HCPCS | Performed by: OTOLARYNGOLOGY

## 2021-03-10 PROCEDURE — G8427 DOCREV CUR MEDS BY ELIG CLIN: HCPCS | Performed by: OTOLARYNGOLOGY

## 2021-03-10 PROCEDURE — 1123F ACP DISCUSS/DSCN MKR DOCD: CPT | Performed by: OTOLARYNGOLOGY

## 2021-03-10 PROCEDURE — 1036F TOBACCO NON-USER: CPT | Performed by: OTOLARYNGOLOGY

## 2021-03-10 PROCEDURE — G8482 FLU IMMUNIZE ORDER/ADMIN: HCPCS | Performed by: OTOLARYNGOLOGY

## 2021-03-10 RX ORDER — IPRATROPIUM BROMIDE 42 UG/1
2 SPRAY, METERED NASAL 4 TIMES DAILY
Qty: 1 BOTTLE | Refills: 3 | Status: SHIPPED
Start: 2021-03-10 | End: 2021-07-20 | Stop reason: ALTCHOICE

## 2021-03-10 ASSESSMENT — ENCOUNTER SYMPTOMS
VOMITING: 0
SHORTNESS OF BREATH: 0
RHINORRHEA: 1
VOICE CHANGE: 0
COUGH: 0
SINUS PRESSURE: 0

## 2021-03-10 NOTE — PROGRESS NOTES
[facility] Otolaryngology  Dr. Moris Smith. Hans Oreilly. Ms.Ed        Patient Name:  Khloe Andrea  :  1950     CHIEF C/O:    Chief Complaint   Patient presents with    Follow-up     excess mucus in throat        HISTORY OBTAINED FROM:  patient    HISTORY OF PRESENT ILLNESS:       Reyna Fabian is a 72y.o. year old male, here today for follow up of post nasal drip and bilateral eustachian dysfunction with ear tubes in place. No ear complaints today. Denies ear drainage or ear infection. Patient is currently on Flonase, Astelin, Singulair. Patient doesn't know whether or not he is on Mucenix. Still complaining of thick drainage in his throat when eating and sleeping. He states he has clear rhinorrhea without any focal mucoid drainage, no history of recent sinus infection requiring antibiotic therapy. No facial pain no maxillary sinus tenderness. No other complaints today vision changes hoarseness difficulty swallowing shortness of breath stridor neck mass. Past Medical History:   Diagnosis Date    BPH (benign prostatic hyperplasia)     Colon polyp 2018    Dr. Kenya VALLED (degenerative joint disease) of knee     Hypertension     Spinal stenosis, lumbar      Past Surgical History:   Procedure Laterality Date    CERVICAL LAMINECTOMY      LUMBAR LAMINECTOMY  2010    MYRINGOTOMY Right 2016    Nasal endoscopy with biopsy,biopsy post nasal pharynx    MYRINGOTOMY Right 2019    with right eustachian tube balloon dilation    MYRINGOTOMY AND TYMPANOSTOMY TUBE PLACEMENT Left 2016    Left myringotomy with T-tube insertion    MYRINGOTOMY AND TYMPANOSTOMY TUBE PLACEMENT Left 03/15/2018    Left myringotomy with tympanostomy tube placement     MYRINGOTOMY AND TYMPANOSTOMY TUBE PLACEMENT Right 2019    RIGHT MYRINGOTOMY TUBE REPLACEMENT.  RIGHT EUSTACHIAN TUBE DILATION performed by Katia Sparrow DO at [address] Left 03/15/2018    Nasal endoscopy with eustachian tube balloon dilation, left.  [place] OFFICE/OUTPT VISIT,PROCEDURE ONLY Left 3/15/2018    LEFT EUSTACHIAN TUBE BALLOON DILATION AND LEFT MYRINGOTOMY WITH T-TUBE INSERTION performed by Rodo Cardoza DO at [address]  2003    x2; hydrocelectomy    TONSILLECTOMY      TOTAL KNEE ARTHROPLASTY Right 01/07/2013    Right TKA  JEWELL Padgett MD       Current Outpatient Medications:     verapamil (VERELAN) 240 MG extended release capsule, Take 1 capsule by mouth nightly, Disp: 90 capsule, Rfl: 1    lisinopril-hydroCHLOROthiazide (PRINZIDE;ZESTORETIC) 20-12.5 MG per tablet, Take 1 tablet by mouth daily, Disp: 90 tablet, Rfl: 1    cloNIDine (CATAPRES) 0.1 MG tablet, Take 2 tablets by mouth nightly, Disp: 180 tablet, Rfl: 1    montelukast (SINGULAIR) 10 MG tablet, Take 1 tablet by mouth nightly, Disp: 90 tablet, Rfl: 1    tamsulosin (FLOMAX) 0.4 MG capsule, TAKE 1 CAPSULE BY MOUTH DAILY, Disp: 90 capsule, Rfl: 1    levothyroxine (SYNTHROID) 25 MCG tablet, TAKE 1 TABLET BY MOUTH DAILY, Disp: 90 tablet, Rfl: 1    azelastine (ASTELIN) 0.1 % nasal spray, 2 sprays by Nasal route 2 times daily Use in each nostril as directed, Disp: 1 Bottle, Rfl: 1    fluticasone (FLONASE) 50 MCG/ACT nasal spray, SHAKE LIQUID AND USE 2 SPRAYS IN EACH NOSTRIL EVERY DAY, Disp: 48 g, Rfl: 3  Other  Social History     Tobacco Use    Smoking status: Never Smoker    Smokeless tobacco: Never Used   Substance Use Topics    Alcohol use: No     Alcohol/week: 0.0 standard drinks    Drug use: No     Family History   Problem Relation Age of Onset    Stroke Mother     Osteoarthritis Mother     Coronary Art Dis Father     Pacemaker Father     Hypertension Father     No Known Problems Sister        Review of Systems   Constitutional: Negative for chills and fever. HENT: Positive for congestion, postnasal drip and rhinorrhea. Negative for ear discharge, hearing loss, sinus pressure, sneezing and voice change. Respiratory: Negative for cough and shortness of breath. Cardiovascular: Negative for chest pain and palpitations. Gastrointestinal: Negative for vomiting. Skin: Negative for rash. Allergic/Immunologic: Negative for environmental allergies. Neurological: Negative for dizziness and headaches. Hematological: Does not bruise/bleed easily. All other systems reviewed and are negative. BP (!) 173/86   Pulse 72   Ht 6' 2\" (1.88 m)   Wt 246 lb (111.6 kg)   BMI 31.58 kg/m²   Physical Exam  Vitals signs and nursing note reviewed. Constitutional:       Appearance: He is well-developed. HENT:      Head: Normocephalic and atraumatic. Right Ear: Tympanic membrane and ear canal normal. There is no impacted cerumen. Left Ear: Tympanic membrane and ear canal normal.      Nose: Congestion present. No rhinorrhea. Mouth/Throat:      Mouth: Mucous membranes are moist.      Pharynx: Oropharynx is clear. No oropharyngeal exudate. Eyes:      Pupils: Pupils are equal, round, and reactive to light. Neck:      Musculoskeletal: Normal range of motion. Thyroid: No thyromegaly. Trachea: No tracheal deviation. Cardiovascular:      Rate and Rhythm: Normal rate. Pulmonary:      Effort: Pulmonary effort is normal. No respiratory distress. Musculoskeletal: Normal range of motion. Lymphadenopathy:      Cervical: No cervical adenopathy. Skin:     General: Skin is warm. Findings: No erythema. Neurological:      Mental Status: He is alert. Cranial Nerves: No cranial nerve deficit. IMPRESSION/PLAN:  Encounter Diagnoses   Name Primary?     ETD (Eustachian tube dysfunction), bilateral Yes    Seasonal allergic rhinitis due to other allergic trigger     PND (post-nasal drip)     Vasomotor rhinitis      - Atrovent, ordered, for persistent PND and vasomotor rhinitis  - Continue Flonase, Astelin, Singular and Mucenix  - Consider Clarifix if patient is refractory to medical therapy  - Patient follow-up in 3 months    Case, assessment and plan were discussed attending physician. Adonay Rhodes DO  Resident Physician  Otolaryngology  Blanquita Marks 1942   3/10/2021  10:41 AM            Ivelisse Anabel Rudd  1950      I have discussed the case, including pertinent history and exam findings with the resident. I have seen and examined the patient and the key elements of the encounter have been performed by me. I agree with the assessment, plan and orders as documented by the resident. Patient here for follow up of medical problems. Remainder of medical problems as per resident note.       [address]   3/23/21

## 2021-03-25 ENCOUNTER — TELEPHONE (OUTPATIENT)
Dept: PRIMARY CARE CLINIC | Age: 71
End: 2021-03-25

## 2021-03-29 ENCOUNTER — OFFICE VISIT (OUTPATIENT)
Dept: PRIMARY CARE CLINIC | Age: 71
End: 2021-03-29
Payer: MEDICARE

## 2021-03-29 VITALS
TEMPERATURE: 97.6 F | SYSTOLIC BLOOD PRESSURE: 138 MMHG | OXYGEN SATURATION: 96 % | HEART RATE: 65 BPM | HEIGHT: 74 IN | DIASTOLIC BLOOD PRESSURE: 78 MMHG | WEIGHT: 248 LBS | BODY MASS INDEX: 31.83 KG/M2

## 2021-03-29 DIAGNOSIS — K42.9 UMBILICAL HERNIA WITHOUT OBSTRUCTION AND WITHOUT GANGRENE: Primary | ICD-10-CM

## 2021-03-29 PROCEDURE — 4040F PNEUMOC VAC/ADMIN/RCVD: CPT | Performed by: FAMILY MEDICINE

## 2021-03-29 PROCEDURE — 3017F COLORECTAL CA SCREEN DOC REV: CPT | Performed by: FAMILY MEDICINE

## 2021-03-29 PROCEDURE — 1036F TOBACCO NON-USER: CPT | Performed by: FAMILY MEDICINE

## 2021-03-29 PROCEDURE — G8417 CALC BMI ABV UP PARAM F/U: HCPCS | Performed by: FAMILY MEDICINE

## 2021-03-29 PROCEDURE — 99213 OFFICE O/P EST LOW 20 MIN: CPT | Performed by: FAMILY MEDICINE

## 2021-03-29 PROCEDURE — G8482 FLU IMMUNIZE ORDER/ADMIN: HCPCS | Performed by: FAMILY MEDICINE

## 2021-03-29 PROCEDURE — G8427 DOCREV CUR MEDS BY ELIG CLIN: HCPCS | Performed by: FAMILY MEDICINE

## 2021-03-29 PROCEDURE — 1123F ACP DISCUSS/DSCN MKR DOCD: CPT | Performed by: FAMILY MEDICINE

## 2021-03-29 ASSESSMENT — ENCOUNTER SYMPTOMS
DIARRHEA: 0
CONSTIPATION: 0
ABDOMINAL PAIN: 1
WHEEZING: 0
VOMITING: 0
SHORTNESS OF BREATH: 0
NAUSEA: 0
BACK PAIN: 0
COUGH: 0

## 2021-03-29 NOTE — PROGRESS NOTES
sprays by Nasal route 2 times daily Use in each nostril as directed 1 Bottle 1    fluticasone (FLONASE) 50 MCG/ACT nasal spray SHAKE LIQUID AND USE 2 SPRAYS IN EACH NOSTRIL EVERY DAY 48 g 3    [DISCONTINUED] tamsulosin (FLOMAX) 0.4 MG capsule Take 1 capsule by mouth daily 90 capsule 1     No current facility-administered medications on file prior to visit. Allergies   Allergen Reactions    Other      SEASONAL       Past Medical History:   Diagnosis Date    BPH (benign prostatic hyperplasia)     Colon polyp 01/2018    Dr. Maude WEINER (degenerative joint disease) of knee     Hypertension     Spinal stenosis, lumbar      Past Surgical History:   Procedure Laterality Date    CERVICAL LAMINECTOMY  1989    LUMBAR LAMINECTOMY  2010    MYRINGOTOMY Right 01/14/2016    Nasal endoscopy with biopsy,biopsy post nasal pharynx    MYRINGOTOMY Right 01/24/2019    with right eustachian tube balloon dilation    MYRINGOTOMY AND TYMPANOSTOMY TUBE PLACEMENT Left 12/08/2016    Left myringotomy with T-tube insertion    MYRINGOTOMY AND TYMPANOSTOMY TUBE PLACEMENT Left 03/15/2018    Left myringotomy with tympanostomy tube placement     MYRINGOTOMY AND TYMPANOSTOMY TUBE PLACEMENT Right 1/24/2019    RIGHT MYRINGOTOMY TUBE REPLACEMENT. RIGHT EUSTACHIAN TUBE DILATION performed by Jennifer Hartley DO at [address] [facility] Left 03/15/2018    Nasal endoscopy with eustachian tube balloon dilation, left.  [place] OFFICE/OUTPT VISIT,PROCEDURE ONLY Left 3/15/2018    LEFT EUSTACHIAN TUBE BALLOON DILATION AND LEFT MYRINGOTOMY WITH T-TUBE INSERTION performed by Jennifer Hartley DO at [address]  2003    x2; hydrocelectomy    TONSILLECTOMY      TOTAL KNEE ARTHROPLASTY Right 01/07/2013    Right TKA  JOCELYNN.  Lakshmi Yanes MD     Family History   Problem Relation Age of Onset    Stroke Mother     Osteoarthritis Mother     Coronary Art Dis Father     Pacemaker Father     Hypertension Father    Shayla Garland No Known Problems Sister      Social History     Tobacco History     Smoking Status  Never Smoker    Smokeless Tobacco Use  Never Used                 Vitals:    03/29/21 1040 03/29/21 1255   BP: (!) 162/84 138/78   Pulse: 65    Temp: 97.6 °F (36.4 °C)    SpO2: 96%    Weight: 248 lb (112.5 kg)    Height: 6' 2\" (1.88 m)        Physical Exam:  Physical Exam  Vitals signs and nursing note reviewed. Constitutional:       General: He is not in acute distress. Appearance: Normal appearance. He is well-developed. He is obese. He is not ill-appearing. HENT:      Head: Normocephalic and atraumatic. Right Ear: Hearing and external ear normal.      Left Ear: Hearing and external ear normal.      Nose:      Comments: Patient wearing mask  Eyes:      General: Lids are normal. No scleral icterus. Right eye: No discharge. Left eye: No discharge. Extraocular Movements: Extraocular movements intact. Conjunctiva/sclera: Conjunctivae normal.   Neck:      Musculoskeletal: Normal range of motion and neck supple. Thyroid: No thyromegaly. Vascular: No carotid bruit. Cardiovascular:      Rate and Rhythm: Normal rate and regular rhythm. Heart sounds: Normal heart sounds. No murmur. Pulmonary:      Effort: Pulmonary effort is normal. No respiratory distress. Breath sounds: Normal breath sounds. No wheezing. Abdominal:      Hernia: A hernia is present. Hernia is present in the umbilical area (reducible). Musculoskeletal: Normal range of motion. General: No tenderness or deformity. Right lower leg: No edema. Left lower leg: No edema. Lymphadenopathy:      Cervical: No cervical adenopathy. Skin:     General: Skin is warm and dry. Findings: No rash. Neurological:      General: No focal deficit present. Mental Status: He is alert and oriented to person, place, and time.       Gait: Gait normal.   Psychiatric:         Mood and Affect: Mood and affect normal.         Speech: Speech normal.         Behavior: Behavior normal.         Thought Content: Thought content normal.          Assessment and Plan:  Mira Aguero was seen today for hernia. Diagnoses and all orders for this visit:    Umbilical hernia without obstruction and without gangrene    Discussed with patient that given the lack of pain and that hernia is reducible, no need for surgical intervention at this time. CT scan from episode of kidney stone showed that hernia was fat containing. Discussed that if pain significantly worsens or he cannot reduce it himself, then we will send for surgical intervention. Return if symptoms worsen or fail to improve.       Seen By:  Farhana Juarez, DO

## 2021-04-20 ENCOUNTER — TELEPHONE (OUTPATIENT)
Dept: PRIMARY CARE CLINIC | Age: 71
End: 2021-04-20

## 2021-04-20 NOTE — TELEPHONE ENCOUNTER
The pt is calling because you told him \"if he ever had any issues for him to give us a call. \" So he is calling to let you know that he has \"pressure pains in his rectum, he isn't constipated and he uses the restroom once daily, it just seems like it closes up and feels like he has to use the restroom all day and isn't able to go. \"

## 2021-04-20 NOTE — TELEPHONE ENCOUNTER
Does he have hemorrhoids that he notices? I would recommend starting with Sitz baths first or try Tucks pads and see if that helps decrease the pressure. Otherwise, he may have to see Dr. Donn Mclean again.

## 2021-05-05 RX ORDER — LEVOTHYROXINE SODIUM 0.03 MG/1
25 TABLET ORAL DAILY
Qty: 90 TABLET | Refills: 1 | Status: SHIPPED
Start: 2021-05-05 | End: 2021-09-14

## 2021-06-10 ENCOUNTER — TELEPHONE (OUTPATIENT)
Dept: PRIMARY CARE CLINIC | Age: 71
End: 2021-06-10

## 2021-06-10 DIAGNOSIS — K42.9 UMBILICAL HERNIA WITHOUT OBSTRUCTION AND WITHOUT GANGRENE: Primary | ICD-10-CM

## 2021-06-10 NOTE — TELEPHONE ENCOUNTER
The pt was here to see you 3/29 because he discovered a lump in his navel and you diagnosed him with an umbilical hernia and told him that if it ever started to bother him again to let you know immediately.  He is calling because yesterday it started to bother him again to the point that it is tender to touch it that if he does it sends a shock through him

## 2021-06-10 NOTE — TELEPHONE ENCOUNTER
We need to get him to see a surgeon at this point as it has occurred in such close timing from the last episode. Has he seen anyone that he prefers?

## 2021-06-10 NOTE — TELEPHONE ENCOUNTER
Spoke to him and pain is subsiding - Patient is willing to see surgeon of your choice as he doesn't know any

## 2021-06-22 ENCOUNTER — OFFICE VISIT (OUTPATIENT)
Dept: SURGERY | Age: 71
End: 2021-06-22
Payer: MEDICARE

## 2021-06-22 VITALS
RESPIRATION RATE: 16 BRPM | SYSTOLIC BLOOD PRESSURE: 139 MMHG | DIASTOLIC BLOOD PRESSURE: 80 MMHG | BODY MASS INDEX: 31.57 KG/M2 | HEART RATE: 75 BPM | WEIGHT: 246 LBS | HEIGHT: 74 IN | TEMPERATURE: 98.4 F | OXYGEN SATURATION: 99 %

## 2021-06-22 DIAGNOSIS — K40.20 NON-RECURRENT BILATERAL INGUINAL HERNIA WITHOUT OBSTRUCTION OR GANGRENE: Primary | ICD-10-CM

## 2021-06-22 PROCEDURE — 3017F COLORECTAL CA SCREEN DOC REV: CPT | Performed by: SURGERY

## 2021-06-22 PROCEDURE — 4040F PNEUMOC VAC/ADMIN/RCVD: CPT | Performed by: SURGERY

## 2021-06-22 PROCEDURE — 99203 OFFICE O/P NEW LOW 30 MIN: CPT | Performed by: SURGERY

## 2021-06-22 PROCEDURE — G8427 DOCREV CUR MEDS BY ELIG CLIN: HCPCS | Performed by: SURGERY

## 2021-06-22 PROCEDURE — 1123F ACP DISCUSS/DSCN MKR DOCD: CPT | Performed by: SURGERY

## 2021-06-22 PROCEDURE — G8417 CALC BMI ABV UP PARAM F/U: HCPCS | Performed by: SURGERY

## 2021-06-22 PROCEDURE — 1036F TOBACCO NON-USER: CPT | Performed by: SURGERY

## 2021-06-22 NOTE — PROGRESS NOTES
[address] [facility] Surgery Clinic Note    Assessment/Plan:      Diagnosis Orders   1. Non-recurrent bilateral inguinal hernia and umbilical hernia      He will let us know when he is ready to schedule. Chief Complaint   Patient presents with   [facility] New Patient     here for umbilical hernia. PCP: Jaya Hawthorne DO    HPI: Mavis Fontaine is a 80 y.o. male who presents in consultation for umbilical hernia. He said it for a few months. He said 2 episodes of some discomfort were was tender to touch. Does resolve after short period of time. He also says that he had a CAT scan that showed bilateral inguinal hernias. He has no bulging. He has an occasional slight discomfort. 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 He is undergoing a colonoscopy next week with Dr. Mercedez Constantino. Past Medical History:   Diagnosis Date    BPH (benign prostatic hyperplasia)     Colon polyp 01/2018    Dr. Alicia Proctor DJD (degenerative joint disease) of knee     Hypertension     Spinal stenosis, lumbar        Past Surgical History:   Procedure Laterality Date    CERVICAL LAMINECTOMY  1989    LUMBAR LAMINECTOMY  2010    MYRINGOTOMY Right 01/14/2016    Nasal endoscopy with biopsy,biopsy post nasal pharynx    MYRINGOTOMY Right 01/24/2019    with right eustachian tube balloon dilation    MYRINGOTOMY AND TYMPANOSTOMY TUBE PLACEMENT Left 12/08/2016    Left myringotomy with T-tube insertion    MYRINGOTOMY AND TYMPANOSTOMY TUBE PLACEMENT Left 03/15/2018    Left myringotomy with tympanostomy tube placement     MYRINGOTOMY AND TYMPANOSTOMY TUBE PLACEMENT Right 1/24/2019    RIGHT MYRINGOTOMY TUBE REPLACEMENT. RIGHT EUSTACHIAN TUBE DILATION performed by Cesar Lainez DO at [address] Left 03/15/2018    Nasal endoscopy with eustachian tube balloon dilation, left.     HI OFFICE/OUTPT VISIT,PROCEDURE ONLY Left 3/15/2018    LEFT EUSTACHIAN TUBE BALLOON DILATION AND LEFT MYRINGOTOMY WITH T-TUBE INSERTION performed by Don Malik DO at [address]  2003    x2; hydrocelectomy    TONSILLECTOMY      TOTAL KNEE ARTHROPLASTY Right 01/07/2013    Right TKA  T. Tamara Cummings MD       Prior to Admission medications    Medication Sig Start Date End Date Taking?  Authorizing Provider   levothyroxine (SYNTHROID) 25 MCG tablet TAKE 1 TABLET BY MOUTH DAILY 5/5/21  Yes Brenda Mohr,    ipratropium (ATROVENT) 0.06 % nasal spray 2 sprays by Each Nostril route 4 times daily 3/10/21  Yes Rubén Atkins DO   verapamil (VERELAN) 240 MG extended release capsule Take 1 capsule by mouth nightly 1/11/21 10/8/21 Yes Lisha Juarez DO   lisinopril-hydroCHLOROthiazide (PRINZIDE;ZESTORETIC) 20-12.5 MG per tablet Take 1 tablet by mouth daily 1/11/21  Yes Lisha Juarez DO   cloNIDine (CATAPRES) 0.1 MG tablet Take 2 tablets by mouth nightly 1/11/21 6/22/21 Yes Lisha Juarez DO   montelukast (SINGULAIR) 10 MG tablet Take 1 tablet by mouth nightly 1/11/21  Yes Lisha Juarez DO   tamsulosin (FLOMAX) 0.4 MG capsule TAKE 1 CAPSULE BY MOUTH DAILY 12/28/20  Yes Lisha Juarez DO   azelastine (ASTELIN) 0.1 % nasal spray 2 sprays by Nasal route 2 times daily Use in each nostril as directed 9/29/20  Yes Glen Frost DO   fluticasone (FLONASE) 50 MCG/ACT nasal spray SHAKE LIQUID AND USE 2 SPRAYS IN EACH NOSTRIL EVERY DAY 7/30/20  Yes Rolan Frost DO   tamsulosin (FLOMAX) 0.4 MG capsule Take 1 capsule by mouth daily 7/6/20   Lisha Juarez DO       Allergies   Allergen Reactions    Other      SEASONAL       Social History     Socioeconomic History    Marital status:      Spouse name: None    Number of children: None    Years of education: None    Highest education level: None   Occupational History    Occupation: retired , gasoline loads   Tobacco Use    Smoking status: Never Smoker    Smokeless tobacco: Never Used   Vaping Use    Vaping Use: Never used Substance and Sexual Activity    Alcohol use: No     Alcohol/week: 0.0 standard drinks    Drug use: No    Sexual activity: Yes     Partners: Female   Other Topics Concern    None   Social History Narrative    None     Social Determinants of Health     Financial Resource Strain:     Difficulty of Paying Living Expenses:    Food Insecurity:     Worried About Running Out of Food in the Last Year:     Ran Out of Food in the Last Year:    Transportation Needs:     Lack of Transportation (Medical):  Lack of Transportation (Non-Medical):    Physical Activity:     Days of Exercise per Week:     Minutes of Exercise per Session:    Stress:     Feeling of Stress :    Social Connections:     Frequency of Communication with Friends and Family:     Frequency of Social Gatherings with Friends and Family:     Attends [employer] Services:     Active Member of Clubs or Organizations:     Attends Club or Organization Meetings:     Marital Status:    Intimate Partner Violence:     Fear of Current or Ex-Partner:     Emotionally Abused:     Physically Abused:     Sexually Abused:        Family History   Problem Relation Age of Onset    Stroke Mother     Osteoarthritis Mother     Coronary Art Dis Father     Pacemaker Father     Hypertension Father     No Known Problems Sister        Review of Systems   All other systems reviewed and are negative. Objective:  Vitals:    06/22/21 1406   BP: 139/80   Pulse: 75   Resp: 16   Temp: 98.4 °F (36.9 °C)   TempSrc: Temporal   SpO2: 99%   Weight: 246 lb (111.6 kg)   Height: 6' 2\" (1.88 m)          Physical Exam  Constitutional:       General: He is not in acute distress. Appearance: He is not diaphoretic. HENT:      Head: Normocephalic and atraumatic. Eyes:      General:         Right eye: No discharge. Left eye: No discharge. Neck:      Trachea: No tracheal deviation. Cardiovascular:      Rate and Rhythm: Normal rate.    Pulmonary: Effort: Pulmonary effort is normal. No respiratory distress. Abdominal:      General: There is no distension. Palpations: Abdomen is soft. Tenderness: There is no abdominal tenderness. There is no guarding or rebound. Comments: Small umbilical hernia   Skin:     General: Skin is warm and dry. Neurological:      Mental Status: He is alert and oriented to person, place, and time. Raphael Vilchis MD  6/23/2021    NOTE: This report, in part or full,may have been transcribed using voice recognition software. Every effort was made to ensure accuracy; however, inadvertent computerized transcription errors may be present. Please excuse any transcriptional grammatical or spelling errors that may have escaped my editorial review.     CC: Elder Pruett, DO

## 2021-06-23 DIAGNOSIS — R35.1 NOCTURIA: ICD-10-CM

## 2021-06-24 RX ORDER — TAMSULOSIN HYDROCHLORIDE 0.4 MG/1
0.4 CAPSULE ORAL DAILY
Qty: 90 CAPSULE | Refills: 1 | Status: SHIPPED
Start: 2021-06-24 | End: 2021-07-20 | Stop reason: SDUPTHER

## 2021-06-25 ENCOUNTER — OFFICE VISIT (OUTPATIENT)
Dept: ENT CLINIC | Age: 71
End: 2021-06-25
Payer: MEDICARE

## 2021-06-25 ENCOUNTER — PROCEDURE VISIT (OUTPATIENT)
Dept: AUDIOLOGY | Age: 71
End: 2021-06-25
Payer: MEDICARE

## 2021-06-25 VITALS
HEIGHT: 72 IN | WEIGHT: 247 LBS | DIASTOLIC BLOOD PRESSURE: 84 MMHG | BODY MASS INDEX: 33.46 KG/M2 | SYSTOLIC BLOOD PRESSURE: 142 MMHG

## 2021-06-25 DIAGNOSIS — R09.82 PND (POST-NASAL DRIP): ICD-10-CM

## 2021-06-25 DIAGNOSIS — H69.83 ETD (EUSTACHIAN TUBE DYSFUNCTION), BILATERAL: Primary | ICD-10-CM

## 2021-06-25 DIAGNOSIS — J30.89 SEASONAL ALLERGIC RHINITIS DUE TO OTHER ALLERGIC TRIGGER: ICD-10-CM

## 2021-06-25 DIAGNOSIS — H69.82 ETD (EUSTACHIAN TUBE DYSFUNCTION), LEFT: ICD-10-CM

## 2021-06-25 DIAGNOSIS — J30.0 VASOMOTOR RHINITIS: ICD-10-CM

## 2021-06-25 PROCEDURE — 4040F PNEUMOC VAC/ADMIN/RCVD: CPT | Performed by: OTOLARYNGOLOGY

## 2021-06-25 PROCEDURE — 3017F COLORECTAL CA SCREEN DOC REV: CPT | Performed by: OTOLARYNGOLOGY

## 2021-06-25 PROCEDURE — 1036F TOBACCO NON-USER: CPT | Performed by: OTOLARYNGOLOGY

## 2021-06-25 PROCEDURE — G8427 DOCREV CUR MEDS BY ELIG CLIN: HCPCS | Performed by: OTOLARYNGOLOGY

## 2021-06-25 PROCEDURE — 99213 OFFICE O/P EST LOW 20 MIN: CPT | Performed by: OTOLARYNGOLOGY

## 2021-06-25 PROCEDURE — G8417 CALC BMI ABV UP PARAM F/U: HCPCS | Performed by: OTOLARYNGOLOGY

## 2021-06-25 PROCEDURE — 92567 TYMPANOMETRY: CPT | Performed by: AUDIOLOGIST

## 2021-06-25 PROCEDURE — 1123F ACP DISCUSS/DSCN MKR DOCD: CPT | Performed by: OTOLARYNGOLOGY

## 2021-06-25 ASSESSMENT — ENCOUNTER SYMPTOMS
SINUS PRESSURE: 0
VOMITING: 0
SHORTNESS OF BREATH: 0
VOICE CHANGE: 0
COUGH: 0
RHINORRHEA: 1

## 2021-06-25 NOTE — PROGRESS NOTES
This patient was referred for tympanometric testing by Dr. Lazara Cantu. Patient being seen for a 4-month PE tube check. Tympanometry revealed a flat tympanogram, with no middle ear peak pressure, left ear. A seal could not be obtained, right ear. Ipsilateral acoustic reflexes were absent, left ear and could not be tested, right ear at 1000Hz. The results were reviewed with the patient. Recommendations for follow up will be made pending physician consult.     Max Perez CCC/[facility]  Audiologist  [id number]  NPI#:  [id number]

## 2021-07-14 DIAGNOSIS — R09.82 POSTNASAL DRIP: ICD-10-CM

## 2021-07-16 RX ORDER — MONTELUKAST SODIUM 10 MG/1
TABLET ORAL
Qty: 90 TABLET | Refills: 1 | Status: SHIPPED
Start: 2021-07-16 | End: 2022-01-07

## 2021-07-20 ENCOUNTER — OFFICE VISIT (OUTPATIENT)
Dept: PRIMARY CARE CLINIC | Age: 71
End: 2021-07-20
Payer: MEDICARE

## 2021-07-20 VITALS
HEIGHT: 72 IN | TEMPERATURE: 97.6 F | SYSTOLIC BLOOD PRESSURE: 136 MMHG | DIASTOLIC BLOOD PRESSURE: 78 MMHG | WEIGHT: 242 LBS | HEART RATE: 68 BPM | BODY MASS INDEX: 32.78 KG/M2 | OXYGEN SATURATION: 96 %

## 2021-07-20 DIAGNOSIS — R35.1 NOCTURIA: ICD-10-CM

## 2021-07-20 DIAGNOSIS — E55.9 VITAMIN D INSUFFICIENCY: ICD-10-CM

## 2021-07-20 DIAGNOSIS — R73.01 IMPAIRED FASTING GLUCOSE: ICD-10-CM

## 2021-07-20 DIAGNOSIS — I10 ESSENTIAL HYPERTENSION: ICD-10-CM

## 2021-07-20 DIAGNOSIS — Z00.00 ROUTINE GENERAL MEDICAL EXAMINATION AT A HEALTH CARE FACILITY: Primary | ICD-10-CM

## 2021-07-20 LAB
ALBUMIN SERPL-MCNC: 4.5 G/DL (ref 3.5–5.2)
ALP BLD-CCNC: 49 U/L (ref 40–129)
ALT SERPL-CCNC: 10 U/L (ref 0–40)
ANION GAP SERPL CALCULATED.3IONS-SCNC: 14 MMOL/L (ref 7–16)
AST SERPL-CCNC: 15 U/L (ref 0–39)
BASOPHILS ABSOLUTE: 0.08 E9/L (ref 0–0.2)
BASOPHILS RELATIVE PERCENT: 1 % (ref 0–2)
BILIRUB SERPL-MCNC: 0.7 MG/DL (ref 0–1.2)
BILIRUBIN URINE: NEGATIVE
BLOOD, URINE: NEGATIVE
BUN BLDV-MCNC: 15 MG/DL (ref 6–23)
CALCIUM SERPL-MCNC: 9 MG/DL (ref 8.6–10.2)
CHLORIDE BLD-SCNC: 103 MMOL/L (ref 98–107)
CHOLESTEROL, TOTAL: 144 MG/DL (ref 0–199)
CLARITY: CLEAR
CO2: 22 MMOL/L (ref 22–29)
COLOR: YELLOW
CREAT SERPL-MCNC: 1.1 MG/DL (ref 0.7–1.2)
EOSINOPHILS ABSOLUTE: 0.17 E9/L (ref 0.05–0.5)
EOSINOPHILS RELATIVE PERCENT: 2.1 % (ref 0–6)
FOLATE: 6 NG/ML (ref 4.8–24.2)
GFR AFRICAN AMERICAN: >60
GFR NON-AFRICAN AMERICAN: >60 ML/MIN/1.73
GLUCOSE BLD-MCNC: 110 MG/DL (ref 74–99)
GLUCOSE URINE: NEGATIVE MG/DL
HBA1C MFR BLD: 5.5 % (ref 4–5.6)
HCT VFR BLD CALC: 43 % (ref 37–54)
HDLC SERPL-MCNC: 39 MG/DL
HEMOGLOBIN: 14.1 G/DL (ref 12.5–16.5)
IMMATURE GRANULOCYTES #: 0.04 E9/L
IMMATURE GRANULOCYTES %: 0.5 % (ref 0–5)
KETONES, URINE: NEGATIVE MG/DL
LDL CHOLESTEROL CALCULATED: 91 MG/DL (ref 0–99)
LEUKOCYTE ESTERASE, URINE: NEGATIVE
LYMPHOCYTES ABSOLUTE: 1.36 E9/L (ref 1.5–4)
LYMPHOCYTES RELATIVE PERCENT: 17.1 % (ref 20–42)
MCH RBC QN AUTO: 30.2 PG (ref 26–35)
MCHC RBC AUTO-ENTMCNC: 32.8 % (ref 32–34.5)
MCV RBC AUTO: 92.1 FL (ref 80–99.9)
MONOCYTES ABSOLUTE: 0.49 E9/L (ref 0.1–0.95)
MONOCYTES RELATIVE PERCENT: 6.2 % (ref 2–12)
NEUTROPHILS ABSOLUTE: 5.81 E9/L (ref 1.8–7.3)
NEUTROPHILS RELATIVE PERCENT: 73.1 % (ref 43–80)
NITRITE, URINE: NEGATIVE
PDW BLD-RTO: 13.2 FL (ref 11.5–15)
PH UA: 6.5 (ref 5–9)
PLATELET # BLD: 244 E9/L (ref 130–450)
PMV BLD AUTO: 10.6 FL (ref 7–12)
POTASSIUM SERPL-SCNC: 4.2 MMOL/L (ref 3.5–5)
PROTEIN UA: NEGATIVE MG/DL
RBC # BLD: 4.67 E12/L (ref 3.8–5.8)
SODIUM BLD-SCNC: 139 MMOL/L (ref 132–146)
SPECIFIC GRAVITY UA: 1.02 (ref 1–1.03)
TOTAL PROTEIN: 6.8 G/DL (ref 6.4–8.3)
TRIGL SERPL-MCNC: 72 MG/DL (ref 0–149)
TSH SERPL DL<=0.05 MIU/L-ACNC: 3.18 UIU/ML (ref 0.27–4.2)
URIC ACID, SERUM: 5.4 MG/DL (ref 3.4–7)
UROBILINOGEN, URINE: 0.2 E.U./DL
VITAMIN B-12: 496 PG/ML (ref 211–946)
VITAMIN D 25-HYDROXY: 29 NG/ML (ref 30–100)
VLDLC SERPL CALC-MCNC: 14 MG/DL
WBC # BLD: 8 E9/L (ref 4.5–11.5)

## 2021-07-20 PROCEDURE — 4040F PNEUMOC VAC/ADMIN/RCVD: CPT | Performed by: FAMILY MEDICINE

## 2021-07-20 PROCEDURE — G0439 PPPS, SUBSEQ VISIT: HCPCS | Performed by: FAMILY MEDICINE

## 2021-07-20 PROCEDURE — 1123F ACP DISCUSS/DSCN MKR DOCD: CPT | Performed by: FAMILY MEDICINE

## 2021-07-20 PROCEDURE — 3017F COLORECTAL CA SCREEN DOC REV: CPT | Performed by: FAMILY MEDICINE

## 2021-07-20 RX ORDER — CLONIDINE HYDROCHLORIDE 0.1 MG/1
0.2 TABLET ORAL NIGHTLY
Qty: 180 TABLET | Refills: 1 | Status: SHIPPED
Start: 2021-07-20 | End: 2022-02-11

## 2021-07-20 RX ORDER — TAMSULOSIN HYDROCHLORIDE 0.4 MG/1
0.8 CAPSULE ORAL DAILY
Qty: 180 CAPSULE | Refills: 1 | Status: SHIPPED
Start: 2021-07-20 | End: 2022-01-18

## 2021-07-20 RX ORDER — CLONIDINE HYDROCHLORIDE 0.1 MG/1
0.1 TABLET ORAL 2 TIMES DAILY
COMMUNITY
End: 2022-07-19

## 2021-07-20 RX ORDER — VERAPAMIL HYDROCHLORIDE 240 MG/1
240 CAPSULE, EXTENDED RELEASE ORAL NIGHTLY
Qty: 90 CAPSULE | Refills: 1 | Status: SHIPPED
Start: 2021-07-20 | End: 2022-01-18 | Stop reason: SDUPTHER

## 2021-07-20 RX ORDER — LISINOPRIL AND HYDROCHLOROTHIAZIDE 20; 12.5 MG/1; MG/1
1 TABLET ORAL DAILY
Qty: 90 TABLET | Refills: 1 | Status: SHIPPED
Start: 2021-07-20 | End: 2022-01-18 | Stop reason: SDUPTHER

## 2021-07-20 ASSESSMENT — PATIENT HEALTH QUESTIONNAIRE - PHQ9
1. LITTLE INTEREST OR PLEASURE IN DOING THINGS: 0
SUM OF ALL RESPONSES TO PHQ9 QUESTIONS 1 & 2: 0
SUM OF ALL RESPONSES TO PHQ QUESTIONS 1-9: 0
2. FEELING DOWN, DEPRESSED OR HOPELESS: 0
SUM OF ALL RESPONSES TO PHQ QUESTIONS 1-9: 0
SUM OF ALL RESPONSES TO PHQ QUESTIONS 1-9: 0

## 2021-07-20 ASSESSMENT — LIFESTYLE VARIABLES
AUDIT-C TOTAL SCORE: INCOMPLETE
HOW OFTEN DO YOU HAVE A DRINK CONTAINING ALCOHOL: 0
AUDIT TOTAL SCORE: INCOMPLETE
HOW OFTEN DO YOU HAVE A DRINK CONTAINING ALCOHOL: NEVER

## 2021-07-20 NOTE — PATIENT INSTRUCTIONS
Personalized Preventive Plan for Itz Zuleta - 7/20/2021  Medicare offers a range of preventive health benefits. Some of the tests and screenings are paid in full while other may be subject to a deductible, co-insurance, and/or copay. Some of these benefits include a comprehensive review of your medical history including lifestyle, illnesses that may run in your family, and various assessments and screenings as appropriate. After reviewing your medical record and screening and assessments performed today your provider may have ordered immunizations, labs, imaging, and/or referrals for you. A list of these orders (if applicable) as well as your Preventive Care list are included within your After Visit Summary for your review. Other Preventive Recommendations:    · A preventive eye exam performed by an eye specialist is recommended every 1-2 years to screen for glaucoma; cataracts, macular degeneration, and other eye disorders. · A preventive dental visit is recommended every 6 months. · Try to get at least 150 minutes of exercise per week or 10,000 steps per day on a pedometer . · Order or download the FREE \"Exercise & Physical Activity: Your Everyday Guide\" from [employer]. Call 6-526.766.7543 or search [employer] online. · You need 2963-9311 mg of calcium and 0797-9689 IU of vitamin D per day. It is possible to meet your calcium requirement with diet alone, but a vitamin D supplement is usually necessary to meet this goal.  · When exposed to the sun, use a sunscreen that protects against both UVA and UVB radiation with an SPF of 30 or greater. Reapply every 2 to 3 hours or after sweating, drying off with a towel, or swimming. · Always wear a seat belt when traveling in a car. Always wear a helmet when riding a bicycle or motorcycle.

## 2021-07-20 NOTE — PROGRESS NOTES
Medicare Annual Wellness Visit  Name: Obed Ibanez Date: 2021   MRN: [id number] Sex: Male   Age: 80 y.o. Ethnicity: Non-/Non    : 1950 Race: [place] Rankin is here for Medicare AWV and Hypertension    Screenings for behavioral, psychosocial and functional/safety risks, and cognitive dysfunction are all negative except as indicated below. These results, as well as other patient data from the [address] form, are documented in Flowsheets linked to this Encounter. Allergies   Allergen Reactions    Other      SEASONAL         Prior to Visit Medications    Medication Sig Taking?  Authorizing Provider   cloNIDine (CATAPRES) 0.1 MG tablet Take 0.1 mg by mouth 2 times daily Yes Historical Provider, MD   verapamil (VERELAN) 240 MG extended release capsule Take 1 capsule by mouth nightly Yes Lisha Juarez DO   lisinopril-hydroCHLOROthiazide (PRINZIDE;ZESTORETIC) 20-12.5 MG per tablet Take 1 tablet by mouth daily Yes Lisha Juarez DO   cloNIDine (CATAPRES) 0.1 MG tablet Take 2 tablets by mouth nightly Yes Lisha Juarez DO   tamsulosin (FLOMAX) 0.4 MG capsule Take 2 capsules by mouth daily Yes Lisha Juarze DO   montelukast (SINGULAIR) 10 MG tablet TAKE 1 TABLET BY MOUTH EVERY NIGHT Yes Harish Pritchett,    levothyroxine (SYNTHROID) 25 MCG tablet TAKE 1 TABLET BY MOUTH DAILY Yes Lisha Juarez DO   fluticasone (FLONASE) 50 MCG/ACT nasal spray SHAKE LIQUID AND USE 2 SPRAYS IN EACH NOSTRIL EVERY DAY Yes Glen Frost,    tamsulosin (FLOMAX) 0.4 MG capsule TAKE 1 CAPSULE BY MOUTH DAILY  Lisha Juarez DO   tamsulosin (FLOMAX) 0.4 MG capsule Take 1 capsule by mouth daily  Lisha Juarez DO         Past Medical History:   Diagnosis Date    BPH (benign prostatic hyperplasia)     Colon polyp 2018    Dr. Carmela Martinez DJD (degenerative joint disease) of knee     Hypertension     Spinal stenosis, lumbar        Past Surgical History: Procedure Laterality Date    CERVICAL LAMINECTOMY  1989    LUMBAR LAMINECTOMY  2010    MYRINGOTOMY Right 01/14/2016    Nasal endoscopy with biopsy,biopsy post nasal pharynx    MYRINGOTOMY Right 01/24/2019    with right eustachian tube balloon dilation    MYRINGOTOMY AND TYMPANOSTOMY TUBE PLACEMENT Left 12/08/2016    Left myringotomy with T-tube insertion    MYRINGOTOMY AND TYMPANOSTOMY TUBE PLACEMENT Left 03/15/2018    Left myringotomy with tympanostomy tube placement     MYRINGOTOMY AND TYMPANOSTOMY TUBE PLACEMENT Right 1/24/2019    RIGHT MYRINGOTOMY TUBE REPLACEMENT. RIGHT EUSTACHIAN TUBE DILATION performed by Fabiola Adams DO at [address] Left 03/15/2018    Nasal endoscopy with eustachian tube balloon dilation, left.  AL OFFICE/OUTPT VISIT,PROCEDURE ONLY Left 3/15/2018    LEFT EUSTACHIAN TUBE BALLOON DILATION AND LEFT MYRINGOTOMY WITH T-TUBE INSERTION performed by Fabiola Adams DO at [address]  2003    x2; hydrocelectomy    TONSILLECTOMY      TOTAL KNEE ARTHROPLASTY Right 01/07/2013    Right TKA  T.  Aram Mendoza MD         Family History   Problem Relation Age of Onset    Stroke Mother     Osteoarthritis Mother     Coronary Art Dis Father     Pacemaker Father     Hypertension Father     No Known Problems Sister        CareTeam (Including outside providers/suppliers regularly involved in providing care):   Patient Care Team:  Munira Manley DO as PCP - General (Family Medicine)  Munira Manley DO as PCP - [facility] Empaneled Provider  Fabiola Adams DO as Consulting Physician (Otolaryngology)    Wt Readings from Last 3 Encounters:   07/20/21 242 lb (109.8 kg)   06/25/21 247 lb (112 kg)   06/22/21 246 lb (111.6 kg)     Vitals:    07/20/21 0801 07/20/21 0809 07/20/21 0829   BP: (!) 146/92 (!) 140/90 136/78   Pulse: 68     Temp: 97.6 °F (36.4 °C)     SpO2: 96%     Weight: 242 lb (109.8 kg)     Height: 6' (1.829 m)       Body mass index is 32.82 kg/m². Based upon direct observation of the patient, evaluation of cognition reveals recent and remote memory intact. Physical Exam  Vitals and nursing note reviewed. Constitutional:       General: He is not in acute distress. Appearance: Normal appearance. He is well-developed. He is obese. He is not ill-appearing. HENT:      Head: Normocephalic and atraumatic. Right Ear: Hearing and external ear normal.      Left Ear: Hearing and external ear normal.      Nose:      Comments: Patient wearing mask  Eyes:      General: Lids are normal. No scleral icterus. Right eye: No discharge. Left eye: No discharge. Extraocular Movements: Extraocular movements intact. Conjunctiva/sclera: Conjunctivae normal.   Neck:      Thyroid: No thyromegaly. Vascular: No carotid bruit. Cardiovascular:      Rate and Rhythm: Normal rate and regular rhythm. Heart sounds: Normal heart sounds. No murmur heard. Pulmonary:      Effort: Pulmonary effort is normal. No respiratory distress. Breath sounds: Normal breath sounds. No wheezing. Musculoskeletal:         General: No tenderness or deformity. Normal range of motion. Cervical back: Normal range of motion and neck supple. Right lower leg: No edema. Left lower leg: No edema. Lymphadenopathy:      Cervical: No cervical adenopathy. Skin:     General: Skin is warm and dry. Findings: No rash. Neurological:      General: No focal deficit present. Mental Status: He is alert and oriented to person, place, and time. Gait: Gait normal.   Psychiatric:         Mood and Affect: Mood and affect normal.         Speech: Speech normal.         Behavior: Behavior normal.         Thought Content: Thought content normal.       Patient's complete Health Risk Assessment and screening values have been reviewed and are found in Flowsheets.  The following problems were reviewed today and where indicated follow up appointments were made and/or referrals ordered. Positive Risk Factor Screenings with Interventions:            General Health and ACP:  General  In general, how would you say your health is?: Good  In the past 7 days, have you experienced any of the following?  New or Increased Pain, New or Increased Fatigue, Loneliness, Social Isolation, Stress or Anger?: None of These  Do you get the social and emotional support that you need?: Yes  Do you have a Living Will?: (!) No  Advance Directives     Power of  Living Will ACP-Advance Directive ACP-Power of     Not on File Not on File Not on File Not on File      General Health Risk Interventions:  · No Living Will: Advance Care Planning addressed with patient today    Health Habits/Nutrition:  Health Habits/Nutrition  Do you exercise for at least 20 minutes 2-3 times per week?: (!) No  Have you lost any weight without trying in the past 3 months?: (!) Yes  Do you eat only one meal per day?: No  Have you seen the dentist within the past year?: Yes  Body mass index: (!) 32.82  Health Habits/Nutrition Interventions:  · Inadequate physical activity:  patient is not ready to increase his/her physical activity level at this time  · Nutritional issues:  patient is not ready to address his/her nutritional/weight issues at this time            Personalized Preventive Plan   Current Health Maintenance Status  Immunization History   Administered Date(s) Administered    COVID-19, Gilbert Peter, PF, 30mcg/0.3mL 02/10/2021, 03/03/2021    Influenza Virus Vaccine 10/14/2011, 09/06/2012, 11/13/2016, 11/06/2017    Influenza, Triv, inactivated, subunit, adjuvanted, IM (Fluad 65 yrs and older) 11/08/2019, 09/24/2020    Pneumococcal Conjugate 13-valent ([id number]) 06/09/2016, 11/08/2019    Pneumococcal Polysaccharide ([id number]) 11/06/2017    Tdap (Boostrix, Adacel) 08/24/2018        Health Maintenance   Topic Date Due    Annual Wellness Visit (AWV)  Never done    Shingles Vaccine (1 of 2) 07/20/2022 (Originally 5/21/2001)    Flu vaccine (1) 09/01/2021    PSA counseling  11/19/2021    A1C test (Diabetic or Prediabetic)  01/11/2022    TSH testing  01/11/2022    Potassium monitoring  02/19/2022    Creatinine monitoring  02/19/2022    Lipid screen  01/11/2026    Colon cancer screen colonoscopy  01/11/2028    DTaP/Tdap/Td vaccine (2 - Td or Tdap) 08/24/2028    Pneumococcal 65+ years Vaccine  Completed    COVID-19 Vaccine  Completed    Hepatitis A vaccine  Aged Out    Hepatitis B vaccine  Aged Out    Hib vaccine  Aged Out    Meningococcal (ACWY) vaccine  Aged Out    Hepatitis C screen  Discontinued     Recommendations for [employer] Due: see orders and patient instructions/AVS.    Recommended screening schedule for the next 5-10 years is provided to the patient in written form: see Patient Instructions/AVS.      Assessment and Plan  Garber was seen today for medicare awv and hypertension. Diagnoses and all orders for this visit:    Routine general medical examination at a health care facility  HRA reviewed and addressed. UTD on HM. Just completed colonoscopy with Dr. Stuart Handler. Due for fasting labs at this time. Essential hypertension  -     verapamil (VERELAN) 240 MG extended release capsule; Take 1 capsule by mouth nightly  -     lisinopril-hydroCHLOROthiazide (PRINZIDE;ZESTORETIC) 20-12.5 MG per tablet; Take 1 tablet by mouth daily  -     cloNIDine (CATAPRES) 0.1 MG tablet; Take 2 tablets by mouth nightly  -     CBC Auto Differential; Future  -     Comprehensive Metabolic Panel; Future  -     Lipid Panel; Future  -     TSH without Reflex; Future  -     Vitamin B12 & Folate; Future  -     Urinalysis; Future  -     Uric Acid; Future  Improved on recheck. Continue current medications. Due for fasting labs. Nocturia  -     tamsulosin (FLOMAX) 0.4 MG capsule; Take 2 capsules by mouth daily  Still having issues with nocturia and difficulty urinating. Improved, but not fully. Increase dose of Flomax. Impaired fasting glucose  -     Hemoglobin A1C; Future    Vitamin D insufficiency  -     Vitamin D 25 Hydroxy; Future        Return in about 6 months (around 1/20/2022) for Chronic medical conditions.       Seen By:  Berto Moses DO

## 2021-08-02 ENCOUNTER — TELEPHONE (OUTPATIENT)
Dept: ENT CLINIC | Age: 71
End: 2021-08-02

## 2021-08-02 RX ORDER — FLUTICASONE PROPIONATE 50 MCG
SPRAY, SUSPENSION (ML) NASAL
Qty: 48 G | Refills: 3 | Status: SHIPPED
Start: 2021-08-02 | End: 2022-07-19 | Stop reason: SDUPTHER

## 2021-09-14 RX ORDER — LEVOTHYROXINE SODIUM 0.03 MG/1
25 TABLET ORAL DAILY
Qty: 90 TABLET | Refills: 1 | Status: SHIPPED
Start: 2021-09-14 | End: 2022-01-18 | Stop reason: SDUPTHER

## 2021-10-05 ENCOUNTER — OFFICE VISIT (OUTPATIENT)
Dept: ENT CLINIC | Age: 71
End: 2021-10-05
Payer: MEDICARE

## 2021-10-05 VITALS — HEIGHT: 72 IN | BODY MASS INDEX: 33.05 KG/M2 | WEIGHT: 244 LBS

## 2021-10-05 DIAGNOSIS — J30.0 VASOMOTOR RHINITIS: ICD-10-CM

## 2021-10-05 DIAGNOSIS — H69.83 ETD (EUSTACHIAN TUBE DYSFUNCTION), BILATERAL: ICD-10-CM

## 2021-10-05 DIAGNOSIS — J30.89 SEASONAL ALLERGIC RHINITIS DUE TO OTHER ALLERGIC TRIGGER: Primary | ICD-10-CM

## 2021-10-05 PROCEDURE — 99214 OFFICE O/P EST MOD 30 MIN: CPT | Performed by: OTOLARYNGOLOGY

## 2021-10-05 PROCEDURE — G8484 FLU IMMUNIZE NO ADMIN: HCPCS | Performed by: OTOLARYNGOLOGY

## 2021-10-05 PROCEDURE — 3017F COLORECTAL CA SCREEN DOC REV: CPT | Performed by: OTOLARYNGOLOGY

## 2021-10-05 PROCEDURE — 4040F PNEUMOC VAC/ADMIN/RCVD: CPT | Performed by: OTOLARYNGOLOGY

## 2021-10-05 PROCEDURE — G8427 DOCREV CUR MEDS BY ELIG CLIN: HCPCS | Performed by: OTOLARYNGOLOGY

## 2021-10-05 PROCEDURE — 1123F ACP DISCUSS/DSCN MKR DOCD: CPT | Performed by: OTOLARYNGOLOGY

## 2021-10-05 PROCEDURE — G8417 CALC BMI ABV UP PARAM F/U: HCPCS | Performed by: OTOLARYNGOLOGY

## 2021-10-05 PROCEDURE — 1036F TOBACCO NON-USER: CPT | Performed by: OTOLARYNGOLOGY

## 2021-10-05 RX ORDER — METHYLPREDNISOLONE 4 MG/1
4 TABLET ORAL SEE ADMIN INSTRUCTIONS
Qty: 1 KIT | Refills: 0 | Status: SHIPPED | OUTPATIENT
Start: 2021-10-05 | End: 2021-10-11

## 2021-10-05 ASSESSMENT — ENCOUNTER SYMPTOMS
RHINORRHEA: 1
COUGH: 0
VOMITING: 0
SHORTNESS OF BREATH: 0

## 2021-10-05 NOTE — PROGRESS NOTES
[facility] Otolaryngology  Dr. Boston Irizarry. Andre Goldsmith. Ms.Ed        Patient Name:  Garret Mansfield  :  1950     CHIEF C/O:    Chief Complaint   Patient presents with    Sinus Problem     patient c/o sinus drainage for the last 6 weeks       HISTORY OBTAINED FROM:  patient    HISTORY OF PRESENT ILLNESS:       Maximino Rodgers is a 80y.o. year old male, here today for follow up of sig increase of sinus drainage causing vomitting of drainage greater than6 weeks no improvement of current chronic regimine inquirting on clarifix      Past Medical History:   Diagnosis Date    BPH (benign prostatic hyperplasia)     Colon polyp 2018    Dr. Micha VALLED (degenerative joint disease) of knee     Hypertension     Spinal stenosis, lumbar      Past Surgical History:   Procedure Laterality Date    CERVICAL LAMINECTOMY      LUMBAR LAMINECTOMY  2010    MYRINGOTOMY Right 2016    Nasal endoscopy with biopsy,biopsy post nasal pharynx    MYRINGOTOMY Right 2019    with right eustachian tube balloon dilation    MYRINGOTOMY AND TYMPANOSTOMY TUBE PLACEMENT Left 2016    Left myringotomy with T-tube insertion    MYRINGOTOMY AND TYMPANOSTOMY TUBE PLACEMENT Left 03/15/2018    Left myringotomy with tympanostomy tube placement     MYRINGOTOMY AND TYMPANOSTOMY TUBE PLACEMENT Right 2019    RIGHT MYRINGOTOMY TUBE REPLACEMENT. RIGHT EUSTACHIAN TUBE DILATION performed by Keny Mcguire DO at [address] Left 03/15/2018    Nasal endoscopy with eustachian tube balloon dilation, left.  [place] OFFICE/OUTPT VISIT,PROCEDURE ONLY Left 3/15/2018    LEFT EUSTACHIAN TUBE BALLOON DILATION AND LEFT MYRINGOTOMY WITH T-TUBE INSERTION performed by Keny Mcguire DO at [facility]  2003    x2; hydrocelectomy    TONSILLECTOMY      TOTAL KNEE ARTHROPLASTY Right 2013    Right [facility]  T.  Steffany Hanks MD       Current Outpatient Medications:     methylPREDNISolone (MEDROL DOSEPACK) 4 MG tablet, Take 1 tablet by mouth See Admin Instructions for 6 days Take by mouth., Disp: 1 kit, Rfl: 0    levothyroxine (SYNTHROID) 25 MCG tablet, TAKE 1 TABLET BY MOUTH DAILY, Disp: 90 tablet, Rfl: 1    fluticasone (FLONASE) 50 MCG/ACT nasal spray, SHAKE LIQUID AND USE 2 SPRAYS IN EACH NOSTRIL EVERY DAY, Disp: 48 g, Rfl: 3    cloNIDine (CATAPRES) 0.1 MG tablet, Take 0.1 mg by mouth 2 times daily, Disp: , Rfl:     verapamil (VERELAN) 240 MG extended release capsule, Take 1 capsule by mouth nightly, Disp: 90 capsule, Rfl: 1    lisinopril-hydroCHLOROthiazide (PRINZIDE;ZESTORETIC) 20-12.5 MG per tablet, Take 1 tablet by mouth daily, Disp: 90 tablet, Rfl: 1    cloNIDine (CATAPRES) 0.1 MG tablet, Take 2 tablets by mouth nightly, Disp: 180 tablet, Rfl: 1    tamsulosin (FLOMAX) 0.4 MG capsule, Take 2 capsules by mouth daily, Disp: 180 capsule, Rfl: 1    montelukast (SINGULAIR) 10 MG tablet, TAKE 1 TABLET BY MOUTH EVERY NIGHT, Disp: 90 tablet, Rfl: 1  Other  Social History     Tobacco Use    Smoking status: Never Smoker    Smokeless tobacco: Never Used   Vaping Use    Vaping Use: Never used   Substance Use Topics    Alcohol use: No     Alcohol/week: 0.0 standard drinks    Drug use: No     Family History   Problem Relation Age of Onset    Stroke Mother     Osteoarthritis Mother     Coronary Art Dis Father     Pacemaker Father     Hypertension Father     No Known Problems Sister        Review of Systems   Constitutional: Negative for chills and fever. HENT: Positive for congestion and rhinorrhea. Negative for ear discharge and hearing loss. Respiratory: Negative for cough and shortness of breath. Cardiovascular: Negative for chest pain and palpitations. Gastrointestinal: Negative for vomiting. Skin: Negative for rash. Allergic/Immunologic: Negative for environmental allergies. Neurological: Negative for dizziness and headaches. Hematological: Does not bruise/bleed easily.    All other systems reviewed and are negative. Ht 6' (1.829 m)   Wt 244 lb (110.7 kg)   BMI 33.09 kg/m²   Physical Exam  Vitals and nursing note reviewed. Constitutional:       Appearance: He is well-developed. HENT:      Head: Normocephalic. No contusion, masses or laceration. Jaw: No tenderness or swelling. Salivary Glands: Right salivary gland is not diffusely enlarged. Left salivary gland is not diffusely enlarged. Right Ear: Tympanic membrane, ear canal and external ear normal. No drainage. There is no impacted cerumen. Left Ear: Tympanic membrane, ear canal and external ear normal. No drainage. There is no impacted cerumen. Nose: Septal deviation, congestion and rhinorrhea present. Right Nostril: No epistaxis. Left Nostril: No epistaxis. Right Turbinates: Enlarged and swollen. Left Turbinates: Enlarged and swollen. Mouth/Throat:      Mouth: Mucous membranes are moist.      Dentition: No gum lesions. Palate: No mass. Pharynx: No oropharyngeal exudate or posterior oropharyngeal erythema. Eyes:      Pupils: Pupils are equal, round, and reactive to light. Neck:      Thyroid: No thyromegaly. Trachea: No tracheal deviation. Cardiovascular:      Rate and Rhythm: Normal rate. Pulmonary:      Effort: Pulmonary effort is normal. No respiratory distress. Musculoskeletal:         General: Normal range of motion. Cervical back: Normal range of motion. Lymphadenopathy:      Cervical: No cervical adenopathy. Skin:     General: Skin is warm. Findings: No erythema. Neurological:      Mental Status: He is alert. Cranial Nerves: No cranial nerve deficit. IMPRESSION/PLAN:  clarifix in office to have reviewed today including bleeding and infection, and lack of significant improvement overall.   In addition, patient provided with a Medrol Dosepak for his chronic allergic rhinitis and congestion in 6 but exacerbated most

## 2021-10-07 ENCOUNTER — TELEPHONE (OUTPATIENT)
Dept: ENT CLINIC | Age: 71
End: 2021-10-07

## 2021-10-08 NOTE — TELEPHONE ENCOUNTER
Patient returned call about setting up surgery date he can be reached at [phone] or his cell number is [phone].

## 2021-10-12 NOTE — TELEPHONE ENCOUNTER
ENDOCRINOLOGY DIABETES CONSULT NOTE    ADMISSION DATE:  2/26/2021  DATE:  3/1/2021  CURRENT HOSPITAL DAY:  Hospital Day: 4  CONSULTING PHYSICIAN:  Danilo Dumont MD  ATTENDING PHYSICIAN:  Destini Gilliam MD  CODE STATUS:  Selective Treatment/DNR      SUBJECTIVE:    History of Present Illness:   Chart reviewed.  Talked to RN taking care of patient.  Patient interviewed.  Attempted to contact patient's sister but got only voice mail despite multiple calls.  I was asked to see Josette Nova at the request of  admitting for endocrine consultation.   Josette Nova is a 68 year old female patient admitted with Weakness [R53.1]. She  Current history is provided by the patient  Patient Has been hospitalized or utilized emergency services for blood sugars in the recent past.  Patient sees Dr Dumont for diabetes management as an outpatient.    Hospital Regimen:  Current hospital regimen for diabetes is basal bolus    Current diet status: Consistent Carb Swengel (60-90 Gm/meal) Diet  Daily W Dinner; Ensure High Protein/high Protein Low Carbohydrate Supplement, Chocolate Oral Nutrition Supplement.  Blood sugars while hospitalized   Glucose (mg/dL)   Date Value   03/01/2021 281 (H)   02/28/2021 209 (H)   02/27/2021 252 (H)   02/26/2021 288 (H)   02/01/2021 204 (H)   01/31/2021 161 (H)   01/30/2021 270 (H)   01/29/2021 282 (H)   01/20/2021 265 (H)   12/26/2020 249 (H)   12/25/2020 168 (H)   12/24/2020 122 (H)   12/01/2020 253 (H)   11/30/2020 250 (H)   11/29/2020 222 (H)   11/28/2020 205 (H)   11/27/2020 177 (H)   11/27/2020 188 (H)   11/13/2020 281 (H)   11/12/2020 284 (H)   11/11/2020 210 (H)   11/10/2020 127 (H)   10/22/2020 127 (H)   10/19/2020 131 (H)   .     Home Diabetic Regimen:  Current regimen is basal bolus.   Blood sugar checks 3-4 times per day.    Hypoglycemic events prior to admission?  []  YES    [x]  NO    Complications:       Current Symptoms: sepsis and pending neuropsychiatric consult.        REVIEW  Called and spoke with patient's wife Sarah Colin and scheduled procedure. OF SYSTEMS:      Please refer to rest of history.  Other systems noncontributory.    OBJECTIVE:    PROBLEM LIST:    Patient Active Problem List   Diagnosis   • Adenoma of ascending colon   • Depression with anxiety   • Dilated cardiomyopathy (CMS/HCC)   • Diverticulosis   • Gastroesophageal reflux disease   • Essential hypertension   • Hypothyroidism   • Obesity, morbid (CMS/[facility])   • JOSE ELIAS (obstructive sleep apnea)   • Restless leg syndrome   • Small cell lung cancer (CMS/[facility])   • Vitamin D deficiency   • Body mass index (BMI) of 40.0-44.9 in adult (CMS/[facility])   • Chronic obstructive pulmonary disease, unspecified (CMS/[facility])   • Major depressive disorder, recurrent, moderate (CMS/[facility])   • Peripheral vascular disease, unspecified (CMS/[facility])   • Nonrheumatic aortic valve stenosis   • Tobacco abuse   • Hyperlipemia, mixed   • Type 2 diabetes mellitus, with long-term current use of insulin (CMS/[facility])   • Severe nonproliferative diabetic retinopathy of both eyes with macular edema associated with type 2 diabetes mellitus (CMS/[facility])   • Encounter for palliative care   • Advance care planning   • Recurrent UTI       HISTORIES:    Past Medical History:    Gastroesophageal reflux disease                 10/20/2014    Lesion of ulnar nerve                           7/9/2020      JOSE ELIAS (obstructive sleep apnea)                                 Coronary artery disease                                       High cholesterol                                              Urinary incontinence                                          Small cell lung cancer (CMS/[facility])                                Comment: with mets to groin  Past Surgical History:   Procedure Laterality Date   • Colonoscopy     • Fracture surgery Left     shoulder     Family History   Problem Relation Age of Onset   • Cancer Mother    • Cancer Father    • Hypertension Sister    • Diabetes Sister    • Cancer Brother        Social History:  Social History     Socioeconomic History    • Marital status: Single     Spouse name: Not on file   • Number of children: Not on file   • Years of education: Not on file   • Highest education level: Not on file   Occupational History   • Not on file   Social Needs   • Financial resource strain: Not on file   • Food insecurity     Worry: Never true     Inability: Never true   • Transportation needs     Medical: No     Non-medical: No   Tobacco Use   • Smoking status: Former Smoker     Packs/day: 3.00     Types: Cigarettes     Quit date:      Years since quittin.1   • Smokeless tobacco: Never Used   Substance and Sexual Activity   • Alcohol use: No     Frequency: Never   • Drug use: Never   • Sexual activity: Not on file   Lifestyle   • Physical activity     Days per week: Patient refused     Minutes per session: Patient refused   • Stress: Rather much   Relationships   • Social connections     Talks on phone: Not on file     Gets together: Not on file     Attends Yazdanism service: Not on file     Active member of club or organization: Not on file     Attends meetings of clubs or organizations: Not on file     Relationship status: Not on file   • Intimate partner violence     Fear of current or ex partner: Not on file     Emotionally abused: Not on file     Physically abused: Not on file     Forced sexual activity: Not on file   Other Topics Concern   • Not on file   Social History Narrative   • Not on file     ALLERGIES:   Allergen Reactions   • Codeine GI UPSET and NAUSEA       Home medications:  Medications Prior to Admission   Medication Sig Dispense Refill   • furosemide (LASIX) 20 MG tablet Take 60 mg by mouth daily.     • omeprazole (PriLOSEC) 40 MG capsule Take 1 capsule by mouth daily. 90 capsule 3   • magnesium oxide (MAG-OX) 400 (240 Mg) MG tablet Take 400 mg by mouth daily.     • bimatoprost (Lumigan) 0.01 % ophthalmic solution Place 1 drop into left eye every evening.      • methenamine (MANDELAMINE) 1 g tablet Take 1 g by mouth 2 times  daily.     • escitalopram (LEXAPRO) 10 MG tablet Take 1 tablet by mouth daily. 90 tablet 1   • atorvastatin (LIPITOR) 40 MG tablet TAKE 1 TABLET BY MOUTH EVERY DAY (Patient taking differently: Take 40 mg by mouth daily. ) 90 tablet 0   • insulin NPH (HumuLIN N, NovoLIN N) 100 UNIT/ML injectable suspension Inject into the skin as directed. 40 units every morning and 50 units every evening     • insulin lispro (HumaLOG) 100 UNIT/ML injectable solution Inject into the skin as directed. 30 units every morning, 20 units at lunch, 30 units every evening     • potassium chloride (K-TAB) 20 MEQ ER tablet Take 20 mEq by mouth daily.  60 tablet    • buPROPion (WELLBUTRIN SR) 150 MG 12 hr tablet Take 1 tablet by mouth 2 times daily. 180 tablet 3   • TOVIAZ 8 MG TABLET SR 24 HR Take 8 mg by mouth daily.     • losartan (COZAAR) 50 MG tablet Take 50 mg by mouth 2 times daily.      • mirabegron ER (MYRBETRIQ) 50 MG 24 hr tablet Take 50 mg by mouth daily.     • brimonidine-timolol (COMBIGAN) 0.2-0.5 % ophthalmic solution Place 1 drop into both eyes 2 times daily.     • fluticasone-vilanterol (BREO ELLIPTA) 200-25 MCG/INH inhaler Inhale 1 puff into the lungs daily.      • aspirin 81 MG tablet Take 81 mg by mouth daily.      • tiotropium (SPIRIVA RESPIMAT) 2.5 MCG/ACT inhaler Inhale 2 puffs into the lungs daily.      • metformin (GLUCOPHAGE) 1000 MG tablet Take 1,000 mg by mouth 2 times daily (with meals).      • levothyroxine (SYNTHROID, LEVOTHROID) 50 MCG tablet Take 50 mcg by mouth daily.      • carvedilol (COREG) 25 MG tablet Take 25 mg by mouth 2 times daily (with meals).      • estradiol (ESTRACE) 0.1 MG/GM vaginal cream Place vaginally 2 days a week. Has not started taking it since did not know how much to apply     • [DISCONTINUED] lactobacillus acidophilus (BACID) Take 1 tablet by mouth daily. Do not start before February 3, 2021.  0   • [DISCONTINUED] clotrimazole (LOTRIMIN) 1 % cream Apply topically every 12 hours. 30 g 0    • [DISCONTINUED] NIFEdipine XL (PROCARDIA XL) 60 MG 24 hr tablet Take 60 mg by mouth daily.   6   • albuterol 108 (90 Base) MCG/ACT inhaler Inhale 2 puffs into the lungs every 4 hours as needed for Shortness of Breath or Wheezing. 1 Inhaler 1       Hospital medications:  • [START ON 3/2/2021] meropenem (MERREM) IVPB  1 g Intravenous 3 times per day   • insulin NPH  45 Units Subcutaneous BID AC   • enoxaparin  40 mg Subcutaneous Q24H   • lactobacillus acidophilus  1 tablet Oral Daily   • sodium chloride (PF)  2 mL Intracatheter 2 times per day   • aspirin  81 mg Oral Daily   • atorvastatin  40 mg Oral Daily   • latanoprost  1 drop Left Eye Nightly   • buPROPion  150 mg Oral BID   • carvedilol  25 mg Oral BID WC   • escitalopram  10 mg Oral Daily   • fluticasone-vilanterol  1 puff Inhalation Daily Resp   • furosemide  60 mg Oral Daily   • levothyroxine  50 mcg Oral QAM AC   • losartan  50 mg Oral BID   • magnesium oxide  400 mg Oral Daily   • mirabegron ER  50 mg Oral Daily   • pantoprazole  40 mg Oral Daily   • tolterodine  2 mg Oral Daily   • insulin lispro   Subcutaneous 4x Daily AC & HS   • insulin lispro  5 Units Subcutaneous TID AC   • brimonidine  1 drop Both Eyes BID    And   • timolol  1 drop Both Eyes BID       VITAL SIGNS:    Vital Last Value 24 Hour Range   Temperature 97.3 °F (36.3 °C) (03/01/21 1935) Temp  Min: 96.4 °F (35.8 °C)  Max: 97.5 °F (36.4 °C)   Pulse 62 (03/01/21 1935) Pulse  Min: 60  Max: 72   Respiratory 18 (03/01/21 1935) Resp  Min: 15  Max: 20   Non-Invasive  Blood Pressure (!) 158/71 (03/01/21 1935) BP  Min: 116/89  Max: 178/79   Pulse Oximetry 97 % (03/01/21 1935) SpO2  Min: 93 %  Max: 98 %     Vital Today Admitted   Weight 117.8 kg (259 lb 11.2 oz) (03/01/21 0409) Weight: 122.7 kg (270 lb 8.1 oz) (02/26/21 0926)   Height N/A Height: 5' 8\" (172.7 cm) (02/26/21 0926)   BMI N/A BMI (Calculated): 41.13 (02/26/21 0926)     INTAKE/OUTPUT:      Intake/Output Summary (Last 24 hours) at  3/1/2021 2016  Last data filed at 3/1/2021 1830  Gross per 24 hour   Intake 1163.54 ml   Output 1000 ml   Net 163.54 ml        PHYSICAL EXAM:    VITAL SIGNS:    Visit Vitals  BP (!) 158/71 (BP Location: RUE - Right upper extremity)   Pulse 62   Temp 97.3 °F (36.3 °C) (Temporal)   Resp 18   Ht 5' 8\" (1.727 m)   Wt 117.8 kg (259 lb 11.2 oz)   SpO2 97%   BMI 39.49 kg/m²     Body mass index is 39.49 kg/m².    WEIGHTS:   Wt Readings from Last 4 Encounters:   03/01/21 117.8 kg (259 lb 11.2 oz)   02/24/21 122 kg (269 lb)   02/18/21 108.9 kg (240 lb)   02/11/21 121.5 kg (267 lb 12.8 oz)       LABORATORY DATA:    Hemoglobin A1C (%)   Date Value   01/20/2021 8.0 (H)   10/19/2020 10.0 (H)     No components found for: CHOLHDL  TRIGLYCERIDE (mg/dL)   Date Value   04/20/2019 187 (H)     Triglycerides (mg/dL)   Date Value   01/20/2021 288 (H)     HDL (mg/dL)   Date Value   01/20/2021 38 (L)   04/20/2019 33 (L)     CALCULATED LDL (mg/dL)   Date Value   04/20/2019 76     LDL (mg/dL)   Date Value   01/20/2021 71     No components found for: NONHDLCALC  Sodium (mmol/L)   Date Value   03/01/2021 143   12/01/2020 135     Potassium (mmol/L)   Date Value   03/01/2021 3.7   12/01/2020 3.9     Chloride (mmol/L)   Date Value   03/01/2021 104   12/01/2020 100     Glucose (mg/dL)   Date Value   03/01/2021 281 (H)   12/01/2020 253 (H)     CALCIUM (mg/dL)   Date Value   12/01/2020 9.1     Calcium (mg/dL)   Date Value   03/01/2021 9.1     Carbon Dioxide (mmol/L)   Date Value   03/01/2021 28   12/01/2020 24     BUN (mg/dL)   Date Value   03/01/2021 20   12/01/2020 12     Creatinine (mg/dL)   Date Value   03/01/2021 0.82   12/01/2020 0.64     TSH (mcUnits/mL)   Date Value   01/20/2021 4.221   07/14/2020 2.159     WBC (K/mcL)   Date Value   03/01/2021 5.5   11/30/2020 6.8     RBC (mil/mcL)   Date Value   03/01/2021 3.91 (L)   11/30/2020 3.90 (L)     HCT (%)   Date Value   03/01/2021 34.0 (L)   11/30/2020 34.4 (L)     HGB (g/dL)   Date Value    03/01/2021 10.7 (L)   11/30/2020 10.7 (L)     PLT (K/mcL)   Date Value   03/01/2021 282   11/30/2020 312     AST/SGOT (Units/L)   Date Value   11/27/2020 17     GOT/AST (Units/L)   Date Value   03/01/2021 16     ALT/SGPT (Units/L)   Date Value   11/27/2020 23     GPT/ALT (Units/L)   Date Value   03/01/2021 28     No results found for: GGTP  ALK PHOSPHATASE (Units/L)   Date Value   11/27/2020 74     Alkaline Phosphatase (Units/L)   Date Value   03/01/2021 81     TOTAL BILIRUBIN (mg/dL)   Date Value   11/27/2020 0.4     Bilirubin, Total (mg/dL)   Date Value   03/01/2021 0.2           ASSESSMENT:  TYPE 2 diabetes mellitus, uncontrolled    PLAN:  Adjustments to current insulin include increasing both basal & bolus.  Monitor bgs and titrate insulin accordingly.      Danilo Salgado MD

## 2021-10-13 ENCOUNTER — TELEPHONE (OUTPATIENT)
Dept: ENT CLINIC | Age: 71
End: 2021-10-13

## 2021-10-13 NOTE — TELEPHONE ENCOUNTER
Prior Authorization Form:      DEMOGRAPHICS:                     Patient Name:  Eula Vazquez  Patient :  1950            Insurance:  Payor: MEDICARE / Plan: MEDICARE PART A AND B / Product Type: *No Product type* /   Insurance ID Number:    Payor/Plan Subscr  Sex Relation Sub. Ins. ID Effective Group Num   1. MEDICARE - [employer] 1950 Male Self [id number]                                    [address] [address]   2.  [address] 1950 Male Self [id number] PLAN [id number]                                   [address]         DIAGNOSIS & PROCEDURE:                       Procedure/Operation: clarifix           CPT Code: 08205, 78154    Diagnosis:  Vasomotor rhinitis    ICD10 Code: j30.0    Location:  office    Surgeon:  Nyla Gudino INFORMATION:                          Date: 21    Time: 1115              Anesthesia:  local                                                       Status:  Outpatient        Special Comments:  n/a       Electronically signed by Waleska Berry MA on 10/13/2021 at 2:40 PM

## 2021-11-05 ENCOUNTER — PROCEDURE VISIT (OUTPATIENT)
Dept: ENT CLINIC | Age: 71
End: 2021-11-05
Payer: MEDICARE

## 2021-11-05 VITALS
DIASTOLIC BLOOD PRESSURE: 79 MMHG | HEART RATE: 66 BPM | BODY MASS INDEX: 33.35 KG/M2 | HEIGHT: 72 IN | WEIGHT: 246.2 LBS | SYSTOLIC BLOOD PRESSURE: 134 MMHG

## 2021-11-05 DIAGNOSIS — J30.0 VASOMOTOR RHINITIS: Primary | ICD-10-CM

## 2021-11-05 PROCEDURE — 30117 REMOVAL OF INTRANASAL LESION: CPT | Performed by: OTOLARYNGOLOGY

## 2021-11-05 RX ORDER — LIDOCAINE HYDROCHLORIDE 40 MG/ML
SOLUTION TOPICAL ONCE
Status: COMPLETED | OUTPATIENT
Start: 2021-11-05 | End: 2021-11-05

## 2021-11-05 RX ORDER — LIDOCAINE HYDROCHLORIDE AND EPINEPHRINE 10; 10 MG/ML; UG/ML
20 INJECTION, SOLUTION INFILTRATION; PERINEURAL ONCE
Status: COMPLETED | OUTPATIENT
Start: 2021-11-05 | End: 2021-11-05

## 2021-11-06 NOTE — PROGRESS NOTES
Op Note    Pre op diagnosis: Vasomotor rhinitis    Post Op: Same    Procedure: Bilateral intranasal visual destruction using Shalini fix device    Anesthesia: Topical 4% lidocaine, 4 cc 1% lidocaine with epinephrine    Surgeon: Arie Baugh    Procedure Note:*Bilateral nares were numbed with afrin and lidocaine sprays and pledgets. The area in between the inferior and middle turbinates as well as the turbinates themselves were infiltrated with 1% lidocaine with 1:100,000 epinephrine with assistant of 0-degree endoscope. Time was allowed for mediation to take effect. The Clarifix was the introduced into the right naris at the area of previous injection. It was turned on for 30 sec and turned off for another 30 seconds. The left naris was also done in the same manner. Complications: none    Blood Loss: Min.     Disposition: home

## 2021-11-12 ENCOUNTER — TELEPHONE (OUTPATIENT)
Dept: ENT CLINIC | Age: 71
End: 2021-11-12

## 2021-11-15 ENCOUNTER — OFFICE VISIT (OUTPATIENT)
Dept: ENT CLINIC | Age: 71
End: 2021-11-15
Payer: MEDICARE

## 2021-11-15 VITALS
BODY MASS INDEX: 33.05 KG/M2 | DIASTOLIC BLOOD PRESSURE: 80 MMHG | HEART RATE: 68 BPM | WEIGHT: 244 LBS | HEIGHT: 72 IN | SYSTOLIC BLOOD PRESSURE: 147 MMHG

## 2021-11-15 DIAGNOSIS — R09.82 PND (POST-NASAL DRIP): ICD-10-CM

## 2021-11-15 DIAGNOSIS — J30.0 VASOMOTOR RHINITIS: ICD-10-CM

## 2021-11-15 DIAGNOSIS — J30.0 VASOMOTOR RHINITIS: Primary | ICD-10-CM

## 2021-11-15 PROCEDURE — 3017F COLORECTAL CA SCREEN DOC REV: CPT | Performed by: OTOLARYNGOLOGY

## 2021-11-15 PROCEDURE — 1036F TOBACCO NON-USER: CPT | Performed by: OTOLARYNGOLOGY

## 2021-11-15 PROCEDURE — G8427 DOCREV CUR MEDS BY ELIG CLIN: HCPCS | Performed by: OTOLARYNGOLOGY

## 2021-11-15 PROCEDURE — 4040F PNEUMOC VAC/ADMIN/RCVD: CPT | Performed by: OTOLARYNGOLOGY

## 2021-11-15 PROCEDURE — 1123F ACP DISCUSS/DSCN MKR DOCD: CPT | Performed by: OTOLARYNGOLOGY

## 2021-11-15 PROCEDURE — 99024 POSTOP FOLLOW-UP VISIT: CPT | Performed by: OTOLARYNGOLOGY

## 2021-11-15 PROCEDURE — G8417 CALC BMI ABV UP PARAM F/U: HCPCS | Performed by: OTOLARYNGOLOGY

## 2021-11-15 PROCEDURE — G8484 FLU IMMUNIZE NO ADMIN: HCPCS | Performed by: OTOLARYNGOLOGY

## 2021-11-15 RX ORDER — METHYLPREDNISOLONE 4 MG/1
TABLET ORAL
Qty: 1 KIT | Refills: 0 | Status: SHIPPED | OUTPATIENT
Start: 2021-11-15 | End: 2021-11-21

## 2021-11-15 ASSESSMENT — ENCOUNTER SYMPTOMS
VOMITING: 0
COUGH: 0
RHINORRHEA: 1
SHORTNESS OF BREATH: 0

## 2021-11-15 NOTE — PROGRESS NOTES
[facility] Otolaryngology  Dr. Angelica Paula. Josefina Moffett. Ms.Ed        Patient Name:  Regulo Dunlap  :  1950     CHIEF C/O:    Chief Complaint   Patient presents with    Post-Op Check     1wk p/o clarifix       HISTORY OBTAINED FROM:  patient    HISTORY OF PRESENT ILLNESS:       Jacob Fleming is a 80y.o. year old male, here today for follow up of sig increase of sinus drainage causing vomitting of drainage greater than6 weeks no improvement of current chronic regimine inquirting on clarifix      Past Medical History:   Diagnosis Date    BPH (benign prostatic hyperplasia)     Colon polyp 2018    Dr. Delfino VALLED (degenerative joint disease) of knee     Hypertension     Spinal stenosis, lumbar      Past Surgical History:   Procedure Laterality Date    CERVICAL LAMINECTOMY      LUMBAR LAMINECTOMY      MYRINGOTOMY Right 2016    Nasal endoscopy with biopsy,biopsy post nasal pharynx    MYRINGOTOMY Right 2019    with right eustachian tube balloon dilation    MYRINGOTOMY AND TYMPANOSTOMY TUBE PLACEMENT Left 2016    Left myringotomy with T-tube insertion    MYRINGOTOMY AND TYMPANOSTOMY TUBE PLACEMENT Left 03/15/2018    Left myringotomy with tympanostomy tube placement     MYRINGOTOMY AND TYMPANOSTOMY TUBE PLACEMENT Right 2019    RIGHT MYRINGOTOMY TUBE REPLACEMENT. RIGHT EUSTACHIAN TUBE DILATION performed by Ruiz Ellsworth DO at [address] [place] [address] Left 03/15/2018    Nasal endoscopy with eustachian tube balloon dilation, left.  NM OFFICE/OUTPT VISIT,PROCEDURE ONLY Left 3/15/2018    LEFT EUSTACHIAN TUBE BALLOON DILATION AND LEFT MYRINGOTOMY WITH T-TUBE INSERTION performed by Ruiz Ellsworth DO at [address]  2003    x2; hydrocelectomy    TONSILLECTOMY      TOTAL KNEE ARTHROPLASTY Right 2013    Right TKA  T.  Atif Magallanes MD       Current Outpatient Medications:     methylPREDNISolone (MEDROL DOSEPACK) 4 MG tablet, Take by mouth., Disp: 1 kit, Rfl: 0    levothyroxine (SYNTHROID) 25 MCG tablet, TAKE 1 TABLET BY MOUTH DAILY, Disp: 90 tablet, Rfl: 1    fluticasone (FLONASE) 50 MCG/ACT nasal spray, SHAKE LIQUID AND USE 2 SPRAYS IN EACH NOSTRIL EVERY DAY, Disp: 48 g, Rfl: 3    cloNIDine (CATAPRES) 0.1 MG tablet, Take 0.1 mg by mouth 2 times daily, Disp: , Rfl:     verapamil (VERELAN) 240 MG extended release capsule, Take 1 capsule by mouth nightly, Disp: 90 capsule, Rfl: 1    lisinopril-hydroCHLOROthiazide (PRINZIDE;ZESTORETIC) 20-12.5 MG per tablet, Take 1 tablet by mouth daily, Disp: 90 tablet, Rfl: 1    montelukast (SINGULAIR) 10 MG tablet, TAKE 1 TABLET BY MOUTH EVERY NIGHT, Disp: 90 tablet, Rfl: 1    cloNIDine (CATAPRES) 0.1 MG tablet, Take 2 tablets by mouth nightly, Disp: 180 tablet, Rfl: 1    tamsulosin (FLOMAX) 0.4 MG capsule, Take 2 capsules by mouth daily, Disp: 180 capsule, Rfl: 1  Other  Social History     Tobacco Use    Smoking status: Never Smoker    Smokeless tobacco: Never Used   Vaping Use    Vaping Use: Never used   Substance Use Topics    Alcohol use: No     Alcohol/week: 0.0 standard drinks    Drug use: No     Family History   Problem Relation Age of Onset    Stroke Mother     Osteoarthritis Mother     Coronary Art Dis Father     Pacemaker Father     Hypertension Father     No Known Problems Sister        Review of Systems   Constitutional: Negative for chills and fever. HENT: Positive for congestion and rhinorrhea. Negative for ear discharge and hearing loss. Respiratory: Negative for cough and shortness of breath. Cardiovascular: Negative for chest pain and palpitations. Gastrointestinal: Negative for vomiting. Skin: Negative for rash. Allergic/Immunologic: Negative for environmental allergies. Neurological: Negative for dizziness and headaches. Hematological: Does not bruise/bleed easily. All other systems reviewed and are negative.       BP (!) 147/80 (Site: Right Upper Arm, Position: Sitting, Cuff Size: Large Adult)   Pulse 68   Ht 6' (1.829 m)   Wt 244 lb (110.7 kg)   BMI 33.09 kg/m²   Physical Exam  Vitals and nursing note reviewed. Constitutional:       Appearance: He is well-developed. HENT:      Head: Normocephalic. No contusion, masses or laceration. Jaw: No tenderness or swelling. Salivary Glands: Right salivary gland is not diffusely enlarged. Left salivary gland is not diffusely enlarged. Right Ear: Tympanic membrane, ear canal and external ear normal. No drainage. There is no impacted cerumen. Left Ear: Tympanic membrane, ear canal and external ear normal. No drainage. There is no impacted cerumen. Nose: Septal deviation, congestion and rhinorrhea present. Right Nostril: No epistaxis. Left Nostril: No epistaxis. Right Turbinates: Enlarged and swollen. Left Turbinates: Enlarged and swollen. Mouth/Throat:      Mouth: Mucous membranes are moist.      Dentition: No gum lesions. Palate: No mass. Pharynx: No oropharyngeal exudate or posterior oropharyngeal erythema. Eyes:      Pupils: Pupils are equal, round, and reactive to light. Neck:      Thyroid: No thyromegaly. Trachea: No tracheal deviation. Cardiovascular:      Rate and Rhythm: Normal rate. Pulmonary:      Effort: Pulmonary effort is normal. No respiratory distress. Musculoskeletal:         General: Normal range of motion. Cervical back: Normal range of motion. Lymphadenopathy:      Cervical: No cervical adenopathy. Skin:     General: Skin is warm. Findings: No erythema. Neurological:      Mental Status: He is alert. Cranial Nerves: No cranial nerve deficit. IMPRESSION/PLAN:        Postop Shalini fix interventional procedure. Patient is having some moderate response already, continue saline rinses and follow-up with ENT in 6 weeks.

## 2021-11-26 LAB
Lab: NORMAL
REPORT: NORMAL
THIS TEST SENT TO: NORMAL

## 2021-11-30 ENCOUNTER — OFFICE VISIT (OUTPATIENT)
Dept: ENT CLINIC | Age: 71
End: 2021-11-30
Payer: MEDICARE

## 2021-11-30 VITALS
HEIGHT: 72 IN | SYSTOLIC BLOOD PRESSURE: 147 MMHG | BODY MASS INDEX: 33.05 KG/M2 | DIASTOLIC BLOOD PRESSURE: 87 MMHG | HEART RATE: 84 BPM | WEIGHT: 244 LBS

## 2021-11-30 DIAGNOSIS — J30.0 VASOMOTOR RHINITIS: Primary | ICD-10-CM

## 2021-11-30 PROCEDURE — 1123F ACP DISCUSS/DSCN MKR DOCD: CPT | Performed by: OTOLARYNGOLOGY

## 2021-11-30 PROCEDURE — 4040F PNEUMOC VAC/ADMIN/RCVD: CPT | Performed by: OTOLARYNGOLOGY

## 2021-11-30 PROCEDURE — G8417 CALC BMI ABV UP PARAM F/U: HCPCS | Performed by: OTOLARYNGOLOGY

## 2021-11-30 PROCEDURE — 1036F TOBACCO NON-USER: CPT | Performed by: OTOLARYNGOLOGY

## 2021-11-30 PROCEDURE — 3017F COLORECTAL CA SCREEN DOC REV: CPT | Performed by: OTOLARYNGOLOGY

## 2021-11-30 PROCEDURE — G8427 DOCREV CUR MEDS BY ELIG CLIN: HCPCS | Performed by: OTOLARYNGOLOGY

## 2021-11-30 PROCEDURE — 99024 POSTOP FOLLOW-UP VISIT: CPT | Performed by: OTOLARYNGOLOGY

## 2021-11-30 PROCEDURE — G8484 FLU IMMUNIZE NO ADMIN: HCPCS | Performed by: OTOLARYNGOLOGY

## 2021-11-30 NOTE — PROGRESS NOTES
[id number] [facility] Otolaryngology  Dr. Oralee Comp. Arno Lesch. Ms.Ed        Patient Name:  Carlita Martinez  :  1950     CHIEF C/O:    Chief Complaint   Patient presents with    Follow-up     2 wk follow up allergy testing results,        HISTORY OBTAINED FROM:  patient    HISTORY OF PRESENT ILLNESS:       Angelo Bennett is a 80y.o. year old male, here today for follow up chronic allergic rhinitis congestion postnasal drainage, he has a history of allergy testing as well that was accomplished today and reviewed showing no significant environmental or food allergens. Patient also has a history of chronic allergic rhinitis, as well as a vasomotor component for which he underwent intranasal neural destruction with Shalini fix and has had overall improvement, he has stopped his Flonase, he still has some mild symptomatic days but globally overall has improved significantly. Past Medical History:   Diagnosis Date    BPH (benign prostatic hyperplasia)     Colon polyp 2018    Dr. Carbajal Oscar DJD (degenerative joint disease) of knee     Hypertension     Spinal stenosis, lumbar      Past Surgical History:   Procedure Laterality Date    CERVICAL LAMINECTOMY      LUMBAR LAMINECTOMY      MYRINGOTOMY Right 2016    Nasal endoscopy with biopsy,biopsy post nasal pharynx    MYRINGOTOMY Right 2019    with right eustachian tube balloon dilation    MYRINGOTOMY AND TYMPANOSTOMY TUBE PLACEMENT Left 2016    Left myringotomy with T-tube insertion    MYRINGOTOMY AND TYMPANOSTOMY TUBE PLACEMENT Left 03/15/2018    Left myringotomy with tympanostomy tube placement     MYRINGOTOMY AND TYMPANOSTOMY TUBE PLACEMENT Right 2019    RIGHT MYRINGOTOMY TUBE REPLACEMENT. RIGHT EUSTACHIAN TUBE DILATION performed by Janell Fan DO at [facility] Left 03/15/2018    Nasal endoscopy with eustachian tube balloon dilation, left.     DE OFFICE/OUTPT VISIT,PROCEDURE ONLY Left 3/15/2018    LEFT EUSTACHIAN TUBE BALLOON DILATION AND LEFT MYRINGOTOMY WITH T-TUBE INSERTION performed by Ruiz Ellsworth DO at [address]  2003    x2; hydrocelectomy    TONSILLECTOMY      TOTAL KNEE ARTHROPLASTY Right 01/07/2013    Right TKA  JEWELL Padgett MD       Current Outpatient Medications:     levothyroxine (SYNTHROID) 25 MCG tablet, TAKE 1 TABLET BY MOUTH DAILY, Disp: 90 tablet, Rfl: 1    fluticasone (FLONASE) 50 MCG/ACT nasal spray, SHAKE LIQUID AND USE 2 SPRAYS IN EACH NOSTRIL EVERY DAY, Disp: 48 g, Rfl: 3    cloNIDine (CATAPRES) 0.1 MG tablet, Take 0.1 mg by mouth 2 times daily, Disp: , Rfl:     verapamil (VERELAN) 240 MG extended release capsule, Take 1 capsule by mouth nightly, Disp: 90 capsule, Rfl: 1    lisinopril-hydroCHLOROthiazide (PRINZIDE;ZESTORETIC) 20-12.5 MG per tablet, Take 1 tablet by mouth daily, Disp: 90 tablet, Rfl: 1    montelukast (SINGULAIR) 10 MG tablet, TAKE 1 TABLET BY MOUTH EVERY NIGHT, Disp: 90 tablet, Rfl: 1    cloNIDine (CATAPRES) 0.1 MG tablet, Take 2 tablets by mouth nightly, Disp: 180 tablet, Rfl: 1    tamsulosin (FLOMAX) 0.4 MG capsule, Take 2 capsules by mouth daily, Disp: 180 capsule, Rfl: 1  Other  Social History     Tobacco Use    Smoking status: Never Smoker    Smokeless tobacco: Never Used   Vaping Use    Vaping Use: Never used   Substance Use Topics    Alcohol use: No     Alcohol/week: 0.0 standard drinks    Drug use: No     Family History   Problem Relation Age of Onset    Stroke Mother     Osteoarthritis Mother     Coronary Art Dis Father     Pacemaker Father     Hypertension Father     No Known Problems Sister        Review of Systems   Constitutional: Negative for chills and fever. HENT: Positive for congestion and rhinorrhea. Negative for ear discharge, hearing loss, nosebleeds, sore throat, tinnitus and trouble swallowing. Respiratory: Negative for cough and shortness of breath.     Cardiovascular: Negative for chest pain and palpitations. Gastrointestinal: Negative for vomiting. Skin: Negative for rash. Allergic/Immunologic: Negative for environmental allergies. Neurological: Negative for dizziness and headaches. Hematological: Does not bruise/bleed easily. All other systems reviewed and are negative. BP (!) 147/87   Pulse 84   Ht 6' (1.829 m)   Wt 244 lb (110.7 kg)   BMI 33.09 kg/m²   Physical Exam  Vitals and nursing note reviewed. Constitutional:       Appearance: He is well-developed. HENT:      Right Ear: Tympanic membrane and ear canal normal.      Left Ear: Tympanic membrane and ear canal normal.      Nose: Congestion and rhinorrhea present. Mouth/Throat:      Pharynx: Oropharynx is clear. No oropharyngeal exudate or posterior oropharyngeal erythema. Eyes:      Pupils: Pupils are equal, round, and reactive to light. Neck:      Thyroid: No thyromegaly. Trachea: No tracheal deviation. Cardiovascular:      Rate and Rhythm: Normal rate. Pulmonary:      Effort: Pulmonary effort is normal. No respiratory distress. Musculoskeletal:         General: Normal range of motion. Cervical back: Normal range of motion. Lymphadenopathy:      Cervical: No cervical adenopathy. Skin:     General: Skin is warm. Findings: No erythema. Neurological:      Mental Status: He is alert. Cranial Nerves: No cranial nerve deficit. IMPRESSION/PLAN:  Patient is seen and examined for history of chronic vasomotor rhinitis with allergic component, placed on Flonase daily as well as history of Shalini fix it for vasomotor rhinitis with overall significant significant improvement. Recommendations are for the patient to continue current medical therapy, including saline rinses and follow-up in 4 to 6 months. Dr. Anabella Cabrera.  Otolaryngology Facial Plastic Surgery  :  [facility]  Associate Clinical Professor:  Luke Sheridan, Glenys Perla, [facility]

## 2021-12-21 ASSESSMENT — ENCOUNTER SYMPTOMS
SORE THROAT: 0
TROUBLE SWALLOWING: 0
RHINORRHEA: 1
COUGH: 0
SHORTNESS OF BREATH: 0
VOMITING: 0

## 2022-01-07 DIAGNOSIS — R09.82 POSTNASAL DRIP: ICD-10-CM

## 2022-01-07 RX ORDER — MONTELUKAST SODIUM 10 MG/1
TABLET ORAL
Qty: 90 TABLET | Refills: 1 | Status: SHIPPED
Start: 2022-01-07 | End: 2022-01-18

## 2022-01-18 ENCOUNTER — OFFICE VISIT (OUTPATIENT)
Dept: PRIMARY CARE CLINIC | Age: 72
End: 2022-01-18
Payer: MEDICARE

## 2022-01-18 VITALS
BODY MASS INDEX: 33.05 KG/M2 | TEMPERATURE: 97.6 F | WEIGHT: 244 LBS | HEIGHT: 72 IN | OXYGEN SATURATION: 98 % | SYSTOLIC BLOOD PRESSURE: 126 MMHG | DIASTOLIC BLOOD PRESSURE: 78 MMHG | HEART RATE: 65 BPM

## 2022-01-18 DIAGNOSIS — E55.9 VITAMIN D INSUFFICIENCY: ICD-10-CM

## 2022-01-18 DIAGNOSIS — R73.01 IMPAIRED FASTING GLUCOSE: ICD-10-CM

## 2022-01-18 DIAGNOSIS — I10 ESSENTIAL HYPERTENSION: ICD-10-CM

## 2022-01-18 DIAGNOSIS — I10 ESSENTIAL HYPERTENSION: Primary | ICD-10-CM

## 2022-01-18 DIAGNOSIS — E03.9 ACQUIRED HYPOTHYROIDISM: ICD-10-CM

## 2022-01-18 LAB
ALBUMIN SERPL-MCNC: 4.3 G/DL (ref 3.5–5.2)
ALP BLD-CCNC: 49 U/L (ref 40–129)
ALT SERPL-CCNC: 8 U/L (ref 0–40)
ANION GAP SERPL CALCULATED.3IONS-SCNC: 13 MMOL/L (ref 7–16)
AST SERPL-CCNC: 17 U/L (ref 0–39)
BASOPHILS ABSOLUTE: 0.11 E9/L (ref 0–0.2)
BASOPHILS RELATIVE PERCENT: 1.5 % (ref 0–2)
BILIRUB SERPL-MCNC: 0.8 MG/DL (ref 0–1.2)
BILIRUBIN URINE: NEGATIVE
BLOOD, URINE: NEGATIVE
BUN BLDV-MCNC: 14 MG/DL (ref 6–23)
CALCIUM SERPL-MCNC: 9.7 MG/DL (ref 8.6–10.2)
CHLORIDE BLD-SCNC: 103 MMOL/L (ref 98–107)
CHOLESTEROL, TOTAL: 145 MG/DL (ref 0–199)
CLARITY: CLEAR
CO2: 24 MMOL/L (ref 22–29)
COLOR: YELLOW
CREAT SERPL-MCNC: 1 MG/DL (ref 0.7–1.2)
EOSINOPHILS ABSOLUTE: 0.16 E9/L (ref 0.05–0.5)
EOSINOPHILS RELATIVE PERCENT: 2.1 % (ref 0–6)
FOLATE: 8.4 NG/ML (ref 4.8–24.2)
GFR AFRICAN AMERICAN: >60
GFR NON-AFRICAN AMERICAN: >60 ML/MIN/1.73
GLUCOSE BLD-MCNC: 115 MG/DL (ref 74–99)
GLUCOSE URINE: NEGATIVE MG/DL
HBA1C MFR BLD: 5.8 % (ref 4–5.6)
HCT VFR BLD CALC: 43.3 % (ref 37–54)
HDLC SERPL-MCNC: 37 MG/DL
HEMOGLOBIN: 14.1 G/DL (ref 12.5–16.5)
IMMATURE GRANULOCYTES #: 0.04 E9/L
IMMATURE GRANULOCYTES %: 0.5 % (ref 0–5)
KETONES, URINE: NEGATIVE MG/DL
LDL CHOLESTEROL CALCULATED: 88 MG/DL (ref 0–99)
LEUKOCYTE ESTERASE, URINE: NEGATIVE
LYMPHOCYTES ABSOLUTE: 1.49 E9/L (ref 1.5–4)
LYMPHOCYTES RELATIVE PERCENT: 19.7 % (ref 20–42)
MCH RBC QN AUTO: 30.3 PG (ref 26–35)
MCHC RBC AUTO-ENTMCNC: 32.6 % (ref 32–34.5)
MCV RBC AUTO: 92.9 FL (ref 80–99.9)
MONOCYTES ABSOLUTE: 0.48 E9/L (ref 0.1–0.95)
MONOCYTES RELATIVE PERCENT: 6.3 % (ref 2–12)
NEUTROPHILS ABSOLUTE: 5.28 E9/L (ref 1.8–7.3)
NEUTROPHILS RELATIVE PERCENT: 69.9 % (ref 43–80)
NITRITE, URINE: NEGATIVE
PDW BLD-RTO: 13.9 FL (ref 11.5–15)
PH UA: 6.5 (ref 5–9)
PLATELET # BLD: 245 E9/L (ref 130–450)
PMV BLD AUTO: 10.7 FL (ref 7–12)
POTASSIUM SERPL-SCNC: 4.1 MMOL/L (ref 3.5–5)
PROSTATE SPECIFIC ANTIGEN: 4.93 NG/ML (ref 0–4)
PROTEIN UA: NEGATIVE MG/DL
RBC # BLD: 4.66 E12/L (ref 3.8–5.8)
SODIUM BLD-SCNC: 140 MMOL/L (ref 132–146)
SPECIFIC GRAVITY UA: 1.02 (ref 1–1.03)
T4 FREE: 1.23 NG/DL (ref 0.93–1.7)
TOTAL PROTEIN: 7 G/DL (ref 6.4–8.3)
TRIGL SERPL-MCNC: 101 MG/DL (ref 0–149)
TSH SERPL DL<=0.05 MIU/L-ACNC: 2.76 UIU/ML (ref 0.27–4.2)
UROBILINOGEN, URINE: 1 E.U./DL
VITAMIN B-12: 413 PG/ML (ref 211–946)
VITAMIN D 25-HYDROXY: 34 NG/ML (ref 30–100)
VLDLC SERPL CALC-MCNC: 20 MG/DL
WBC # BLD: 7.6 E9/L (ref 4.5–11.5)

## 2022-01-18 PROCEDURE — 4040F PNEUMOC VAC/ADMIN/RCVD: CPT | Performed by: FAMILY MEDICINE

## 2022-01-18 PROCEDURE — 99214 OFFICE O/P EST MOD 30 MIN: CPT | Performed by: FAMILY MEDICINE

## 2022-01-18 PROCEDURE — G8417 CALC BMI ABV UP PARAM F/U: HCPCS | Performed by: FAMILY MEDICINE

## 2022-01-18 PROCEDURE — 1123F ACP DISCUSS/DSCN MKR DOCD: CPT | Performed by: FAMILY MEDICINE

## 2022-01-18 PROCEDURE — 3017F COLORECTAL CA SCREEN DOC REV: CPT | Performed by: FAMILY MEDICINE

## 2022-01-18 PROCEDURE — G8427 DOCREV CUR MEDS BY ELIG CLIN: HCPCS | Performed by: FAMILY MEDICINE

## 2022-01-18 PROCEDURE — G8484 FLU IMMUNIZE NO ADMIN: HCPCS | Performed by: FAMILY MEDICINE

## 2022-01-18 PROCEDURE — 1036F TOBACCO NON-USER: CPT | Performed by: FAMILY MEDICINE

## 2022-01-18 RX ORDER — VERAPAMIL HYDROCHLORIDE 240 MG/1
240 CAPSULE, EXTENDED RELEASE ORAL NIGHTLY
Qty: 90 CAPSULE | Refills: 1 | Status: SHIPPED
Start: 2022-01-18 | End: 2022-07-19 | Stop reason: SDUPTHER

## 2022-01-18 RX ORDER — LISINOPRIL AND HYDROCHLOROTHIAZIDE 20; 12.5 MG/1; MG/1
1 TABLET ORAL DAILY
Qty: 90 TABLET | Refills: 1 | Status: SHIPPED
Start: 2022-01-18 | End: 2022-07-19 | Stop reason: SDUPTHER

## 2022-01-18 RX ORDER — TAMSULOSIN HYDROCHLORIDE 0.4 MG/1
0.4 CAPSULE ORAL 2 TIMES DAILY
COMMUNITY
End: 2022-06-22

## 2022-01-18 RX ORDER — LEVOTHYROXINE SODIUM 0.03 MG/1
25 TABLET ORAL DAILY
Qty: 90 TABLET | Refills: 1 | Status: SHIPPED
Start: 2022-01-18 | End: 2022-07-19 | Stop reason: SDUPTHER

## 2022-01-18 ASSESSMENT — ENCOUNTER SYMPTOMS
BACK PAIN: 0
COUGH: 0
VOMITING: 0
WHEEZING: 0
NAUSEA: 0
ABDOMINAL PAIN: 0
CONSTIPATION: 0
SHORTNESS OF BREATH: 0
DIARRHEA: 0

## 2022-01-18 NOTE — PROGRESS NOTES
22  Xavi Figueredo : 1950 Sex: male  Age: 80 y.o. Chief Complaint   Patient presents with    Medication Refill    Hypertension    Hypothyroidism     HPI:  80 y.o. male patient presents today for 6 month(s) follow up of chronic medical conditions, medication refills and FBW. Patient's chart, medical, surgical and medication history all reviewed. Hypertension   The patient presents today for follow up of HTN. The problem is well controlled. Risk factors for coronary artery disease include Age > 27, male, HTN and elevated cholesterol. Current treatments include clonidine (Catapres), hydrochlorothiazide (HCTZ), lisinopril (Prinivil) and Verapamil. The patient is compliant all of the time. Lifestyle changes the patient has made include none. Today the patient is complaining of none. Hypothyroidism  Patient presents for routine follow up of Hypothyroidism. Current symptoms: none. Patient denies change in energy level, diarrhea, heat / cold intolerance, nervousness, palpitations and weight changes. Symptoms have been well-controlled. No difficulty swallowing or masses felt. Last TSH was 3.180. Patient is currently taking levothryoxine 25 mcg. ROS:  Review of Systems   Constitutional: Negative for chills, fatigue and fever. HENT: Positive for postnasal drip. Respiratory: Negative for cough, shortness of breath and wheezing. Cardiovascular: Negative for chest pain and palpitations. Gastrointestinal: Negative for abdominal pain, constipation, diarrhea, nausea and vomiting. Musculoskeletal: Negative for arthralgias and back pain. Skin: Negative for rash. Neurological: Negative for dizziness and headaches. Psychiatric/Behavioral: Negative for dysphoric mood. The patient is not nervous/anxious. All other systems reviewed and are negative.        Current Outpatient Medications on File Prior to Visit   Medication Sig Dispense Refill    tamsulosin (FLOMAX) 0.4 MG capsule Take 0.4 mg by mouth 2 times daily      fluticasone (FLONASE) 50 MCG/ACT nasal spray SHAKE LIQUID AND USE 2 SPRAYS IN EACH NOSTRIL EVERY DAY 48 g 3    cloNIDine (CATAPRES) 0.1 MG tablet Take 0.1 mg by mouth 2 times daily      cloNIDine (CATAPRES) 0.1 MG tablet Take 2 tablets by mouth nightly 180 tablet 1    [DISCONTINUED] tamsulosin (FLOMAX) 0.4 MG capsule TAKE 1 CAPSULE BY MOUTH DAILY 90 capsule 1    [DISCONTINUED] tamsulosin (FLOMAX) 0.4 MG capsule Take 1 capsule by mouth daily 90 capsule 1     No current facility-administered medications on file prior to visit. Allergies   Allergen Reactions    Other      SEASONAL       Past Medical History:   Diagnosis Date    BPH (benign prostatic hyperplasia)     Colon polyp 01/2018    Dr. Todd WENIER (degenerative joint disease) of knee     Hypertension     Spinal stenosis, lumbar      Past Surgical History:   Procedure Laterality Date    CERVICAL LAMINECTOMY  1989    LUMBAR LAMINECTOMY  2010    MYRINGOTOMY Right 01/14/2016    Nasal endoscopy with biopsy,biopsy post nasal pharynx    MYRINGOTOMY Right 01/24/2019    with right eustachian tube balloon dilation    MYRINGOTOMY AND TYMPANOSTOMY TUBE PLACEMENT Left 12/08/2016    Left myringotomy with T-tube insertion    MYRINGOTOMY AND TYMPANOSTOMY TUBE PLACEMENT Left 03/15/2018    Left myringotomy with tympanostomy tube placement     MYRINGOTOMY AND TYMPANOSTOMY TUBE PLACEMENT Right 1/24/2019    RIGHT MYRINGOTOMY TUBE REPLACEMENT. RIGHT EUSTACHIAN TUBE DILATION performed by Darren Griffiths DO at [address] [place] [address] Left 03/15/2018    Nasal endoscopy with eustachian tube balloon dilation, left.     GA OFFICE/OUTPT VISIT,PROCEDURE ONLY Left 3/15/2018    LEFT EUSTACHIAN TUBE BALLOON DILATION AND LEFT MYRINGOTOMY WITH T-TUBE INSERTION performed by Darren Griffiths DO at [address]  2003    x2; hydrocelectomy    TONSILLECTOMY      TOTAL KNEE ARTHROPLASTY Right 01/07/2013    Right SHEREEN Pereira MD     Family History   Problem Relation Age of Onset   Madison Alonso Stroke Mother     Osteoarthritis Mother     Coronary Art Dis Father     Pacemaker Father     Hypertension Father     No Known Problems Sister      Social History     Socioeconomic History    Marital status:      Spouse name: Not on file    Number of children: Not on file    Years of education: Not on file    Highest education level: Not on file   Occupational History    Occupation: retired , gasoline loads   Tobacco Use    Smoking status: Never Smoker    Smokeless tobacco: Never Used   Vaping Use    Vaping Use: Never used   Substance and Sexual Activity    Alcohol use: No     Alcohol/week: 0.0 standard drinks    Drug use: No    Sexual activity: Yes     Partners: Female   Other Topics Concern    Not on file   Social History Narrative    Not on file     Social Determinants of Health     Financial Resource Strain:     Difficulty of Paying Living Expenses: Not on file   Food Insecurity:     Worried About [address] in the Last Year: Not on file    [address] in the Last Year: Not on file   Transportation Needs:     Lack of Transportation (Medical): Not on file    Lack of Transportation (Non-Medical):  Not on file   Physical Activity:     Days of Exercise per Week: Not on file    Minutes of Exercise per Session: Not on file   Stress:     Feeling of Stress : Not on file   Social Connections:     Frequency of Communication with Friends and Family: Not on file    Frequency of Social Gatherings with Friends and Family: Not on file    Attends Protestant Services: Not on file    Active Member of Clubs or Organizations: Not on file    Attends Club or Organization Meetings: Not on file    Marital Status: Not on file   Intimate Partner Violence:     Fear of Current or Ex-Partner: Not on file    Emotionally Abused: Not on file    Physically Abused: Not on file   Madison Alonso Sexually Abused: Not on file   Housing Stability:     Unable to Pay for Housing in the Last Year: Not on file    Number of Places Lived in the Last Year: Not on file    Unstable Housing in the Last Year: Not on file       Vitals:    01/18/22 0801   BP: 126/78   Pulse: 65   Temp: 97.6 °F (36.4 °C)   SpO2: 98%   Weight: 244 lb (110.7 kg)   Height: 6' (1.829 m)       Physical Exam:  Physical Exam  Vitals and nursing note reviewed. Constitutional:       General: He is not in acute distress. Appearance: Normal appearance. He is well-developed. He is obese. He is not ill-appearing. HENT:      Head: Normocephalic and atraumatic. Right Ear: Hearing and external ear normal.      Left Ear: Hearing and external ear normal.      Nose:      Comments: Wearing mask  Eyes:      General: Lids are normal. No scleral icterus. Extraocular Movements: Extraocular movements intact. Conjunctiva/sclera: Conjunctivae normal.   Neck:      Thyroid: No thyromegaly. Vascular: No carotid bruit. Cardiovascular:      Rate and Rhythm: Normal rate and regular rhythm. Heart sounds: Normal heart sounds. No murmur heard. Pulmonary:      Effort: Pulmonary effort is normal. No respiratory distress. Breath sounds: Normal breath sounds. No wheezing. Musculoskeletal:         General: No tenderness or deformity. Normal range of motion. Cervical back: Normal range of motion and neck supple. Right lower leg: No edema. Left lower leg: No edema. Lymphadenopathy:      Cervical: No cervical adenopathy. Skin:     General: Skin is warm and dry. Findings: No rash. Neurological:      General: No focal deficit present. Mental Status: He is alert and oriented to person, place, and time.       Gait: Gait normal.   Psychiatric:         Mood and Affect: Mood and affect normal.         Speech: Speech normal.         Behavior: Behavior normal.         Labs:  CBC with Differential:    Lab Results Component Value Date    WBC 8.0 07/20/2021    RBC 4.67 07/20/2021    HGB 14.1 07/20/2021    HCT 43.0 07/20/2021     07/20/2021    MCV 92.1 07/20/2021    MCH 30.2 07/20/2021    MCHC 32.8 07/20/2021    RDW 13.2 07/20/2021    SEGSPCT 70 01/10/2013    LYMPHOPCT 17.1 07/20/2021    MONOPCT 6.2 07/20/2021    BASOPCT 1.0 07/20/2021    MONOSABS 0.49 07/20/2021    LYMPHSABS 1.36 07/20/2021    EOSABS 0.17 07/20/2021    BASOSABS 0.08 07/20/2021     CMP:    Lab Results   Component Value Date     07/20/2021    K 4.2 07/20/2021    K 4.1 02/19/2021     07/20/2021    CO2 22 07/20/2021    BUN 15 07/20/2021    CREATININE 1.1 07/20/2021    GFRAA >60 07/20/2021    LABGLOM >60 07/20/2021    GLUCOSE 110 07/20/2021    PROT 6.8 07/20/2021    LABALBU 4.5 07/20/2021    CALCIUM 9.0 07/20/2021    BILITOT 0.7 07/20/2021    ALKPHOS 49 07/20/2021    AST 15 07/20/2021    ALT 10 07/20/2021     HgBA1c:    Lab Results   Component Value Date    LABA1C 5.5 07/20/2021     Microalbumen/Creatinine ratio:  No components found for: RUCREAT  FLP:    Lab Results   Component Value Date    TRIG 72 07/20/2021    HDL 39 07/20/2021    LDLCALC 91 07/20/2021    LABVLDL 14 07/20/2021     TSH:    Lab Results   Component Value Date    TSH 3.180 07/20/2021     PSA:   Lab Results   Component Value Date    PSA 4.54 11/19/2020        Assessment and Plan:  Medina Caraballo was seen today for medication refill, hypertension and hypothyroidism. Diagnoses and all orders for this visit:    Essential hypertension  -     lisinopril-hydroCHLOROthiazide (PRINZIDE;ZESTORETIC) 20-12.5 MG per tablet; Take 1 tablet by mouth daily  -     verapamil (VERELAN) 240 MG extended release capsule; Take 1 capsule by mouth nightly  -     CBC Auto Differential; Future  -     Comprehensive Metabolic Panel; Future  -     Lipid Panel; Future  -     Vitamin B12 & Folate; Future  -     Urinalysis; Future  Well controlled.   Due for refills and labs    Acquired hypothyroidism  - levothyroxine (SYNTHROID) 25 MCG tablet; Take 1 tablet by mouth Daily  -     TSH without Reflex; Future  -     T4, Free; Future    Impaired fasting glucose  -     Hemoglobin A1C; Future    Vitamin D insufficiency  -     Vitamin D 25 Hydroxy; Future          Return in about 6 months (around 7/25/2022), or if symptoms worsen or fail to improve, for AWV.       Seen By:  Glenys James, DO

## 2022-02-10 DIAGNOSIS — I10 ESSENTIAL HYPERTENSION: ICD-10-CM

## 2022-02-11 RX ORDER — CLONIDINE HYDROCHLORIDE 0.1 MG/1
TABLET ORAL
Qty: 180 TABLET | Refills: 1 | Status: SHIPPED
Start: 2022-02-11 | End: 2022-07-19 | Stop reason: SDUPTHER

## 2022-02-15 ENCOUNTER — OFFICE VISIT (OUTPATIENT)
Dept: ENT CLINIC | Age: 72
End: 2022-02-15
Payer: MEDICARE

## 2022-02-15 VITALS
WEIGHT: 247 LBS | HEART RATE: 67 BPM | DIASTOLIC BLOOD PRESSURE: 87 MMHG | BODY MASS INDEX: 33.46 KG/M2 | SYSTOLIC BLOOD PRESSURE: 156 MMHG | HEIGHT: 72 IN

## 2022-02-15 DIAGNOSIS — H69.83 ETD (EUSTACHIAN TUBE DYSFUNCTION), BILATERAL: Primary | ICD-10-CM

## 2022-02-15 DIAGNOSIS — H61.23 BILATERAL IMPACTED CERUMEN: ICD-10-CM

## 2022-02-15 PROCEDURE — 69210 REMOVE IMPACTED EAR WAX UNI: CPT | Performed by: OTOLARYNGOLOGY

## 2022-02-15 PROCEDURE — 1036F TOBACCO NON-USER: CPT | Performed by: OTOLARYNGOLOGY

## 2022-02-15 PROCEDURE — 1123F ACP DISCUSS/DSCN MKR DOCD: CPT | Performed by: OTOLARYNGOLOGY

## 2022-02-15 PROCEDURE — G8427 DOCREV CUR MEDS BY ELIG CLIN: HCPCS | Performed by: OTOLARYNGOLOGY

## 2022-02-15 PROCEDURE — 3017F COLORECTAL CA SCREEN DOC REV: CPT | Performed by: OTOLARYNGOLOGY

## 2022-02-15 PROCEDURE — 4040F PNEUMOC VAC/ADMIN/RCVD: CPT | Performed by: OTOLARYNGOLOGY

## 2022-02-15 PROCEDURE — G8484 FLU IMMUNIZE NO ADMIN: HCPCS | Performed by: OTOLARYNGOLOGY

## 2022-02-15 PROCEDURE — G8417 CALC BMI ABV UP PARAM F/U: HCPCS | Performed by: OTOLARYNGOLOGY

## 2022-02-15 PROCEDURE — 99213 OFFICE O/P EST LOW 20 MIN: CPT | Performed by: OTOLARYNGOLOGY

## 2022-02-15 ASSESSMENT — ENCOUNTER SYMPTOMS
COUGH: 0
VOMITING: 0
RHINORRHEA: 1
SORE THROAT: 0
SHORTNESS OF BREATH: 0
TROUBLE SWALLOWING: 0

## 2022-02-15 NOTE — PROGRESS NOTES
[facility] Otolaryngology  Dr. Clary Alberts. Vikash Vargas. Ms.Ed        Patient Name:  Phani Cates  :  1950     CHIEF C/O:    Chief Complaint   Patient presents with    Follow-up     6 mo cerumen,        HISTORY OBTAINED FROM:  patient    HISTORY OF PRESENT ILLNESS:       Laura Harrell is a 80y.o. year old male, here today for follow up cerumen impaction. He is doing well but ears feel clogged. Had Clarifix in Nov, notes some improvement but still some drainage. Past Medical History:   Diagnosis Date    BPH (benign prostatic hyperplasia)     Colon polyp 2018    Dr. Katey Armando DJD (degenerative joint disease) of knee     Hypertension     Spinal stenosis, lumbar      Past Surgical History:   Procedure Laterality Date    CERVICAL LAMINECTOMY      LUMBAR LAMINECTOMY      MYRINGOTOMY Right 2016    Nasal endoscopy with biopsy,biopsy post nasal pharynx    MYRINGOTOMY Right 2019    with right eustachian tube balloon dilation    MYRINGOTOMY AND TYMPANOSTOMY TUBE PLACEMENT Left 2016    Left myringotomy with T-tube insertion    MYRINGOTOMY AND TYMPANOSTOMY TUBE PLACEMENT Left 03/15/2018    Left myringotomy with tympanostomy tube placement     MYRINGOTOMY AND TYMPANOSTOMY TUBE PLACEMENT Right 2019    RIGHT MYRINGOTOMY TUBE REPLACEMENT. RIGHT EUSTACHIAN TUBE DILATION performed by Evelin Marvin DO at [address] [place] [address] Left 03/15/2018    Nasal endoscopy with eustachian tube balloon dilation, left.  CO OFFICE/OUTPT VISIT,PROCEDURE ONLY Left 3/15/2018    LEFT EUSTACHIAN TUBE BALLOON DILATION AND LEFT MYRINGOTOMY WITH T-TUBE INSERTION performed by Evelin Marvin DO at [facility] 7  2003    x2; hydrocelectomy    TONSILLECTOMY      TOTAL KNEE ARTHROPLASTY Right 2013    Right TKA  T.  Isidro De Paz MD       Current Outpatient Medications:     cloNIDine (CATAPRES) 0.1 MG tablet, TAKE 2 TABLETS BY MOUTH EVERY NIGHT, Disp: 180 tablet, Rfl: 1    tamsulosin (FLOMAX) 0.4 MG capsule, Take 0.4 mg by mouth 2 times daily, Disp: , Rfl:     lisinopril-hydroCHLOROthiazide (PRINZIDE;ZESTORETIC) 20-12.5 MG per tablet, Take 1 tablet by mouth daily, Disp: 90 tablet, Rfl: 1    verapamil (VERELAN) 240 MG extended release capsule, Take 1 capsule by mouth nightly, Disp: 90 capsule, Rfl: 1    levothyroxine (SYNTHROID) 25 MCG tablet, Take 1 tablet by mouth Daily, Disp: 90 tablet, Rfl: 1    fluticasone (FLONASE) 50 MCG/ACT nasal spray, SHAKE LIQUID AND USE 2 SPRAYS IN EACH NOSTRIL EVERY DAY, Disp: 48 g, Rfl: 3    cloNIDine (CATAPRES) 0.1 MG tablet, Take 0.1 mg by mouth 2 times daily, Disp: , Rfl:   Other  Social History     Tobacco Use    Smoking status: Never Smoker    Smokeless tobacco: Never Used   Vaping Use    Vaping Use: Never used   Substance Use Topics    Alcohol use: No     Alcohol/week: 0.0 standard drinks    Drug use: No     Family History   Problem Relation Age of Onset    Stroke Mother     Osteoarthritis Mother     Coronary Art Dis Father     Pacemaker Father     Hypertension Father     No Known Problems Sister        Review of Systems   Constitutional: Negative for chills and fever. HENT: Positive for congestion and rhinorrhea. Negative for ear discharge, hearing loss, nosebleeds, sore throat, tinnitus and trouble swallowing. Respiratory: Negative for cough and shortness of breath. Cardiovascular: Negative for chest pain and palpitations. Gastrointestinal: Negative for vomiting. Skin: Negative for rash. Allergic/Immunologic: Negative for environmental allergies. Neurological: Negative for dizziness and headaches. Hematological: Does not bruise/bleed easily. All other systems reviewed and are negative. BP (!) 156/87   Pulse 67   Ht 6' (1.829 m)   Wt 247 lb (112 kg)   BMI 33.50 kg/m²   Physical Exam  Vitals and nursing note reviewed. Constitutional:       Appearance: He is well-developed.    HENT:      Right Ear: Tympanic membrane and ear canal normal.      Left Ear: Tympanic membrane and ear canal normal.      Ears:        Nose: Congestion and rhinorrhea present. Mouth/Throat:      Pharynx: Oropharynx is clear. No oropharyngeal exudate or posterior oropharyngeal erythema. Eyes:      Pupils: Pupils are equal, round, and reactive to light. Neck:      Thyroid: No thyromegaly. Trachea: No tracheal deviation. Cardiovascular:      Rate and Rhythm: Normal rate. Pulmonary:      Effort: Pulmonary effort is normal. No respiratory distress. Musculoskeletal:         General: Normal range of motion. Cervical back: Normal range of motion. Lymphadenopathy:      Cervical: No cervical adenopathy. Skin:     General: Skin is warm. Findings: No erythema. Neurological:      Mental Status: He is alert. Cranial Nerves: No cranial nerve deficit. Cerumen Impaction Removal Procedure     Auditory canal(s) both ears partially obstructed with cerumen. A microscope was used due to deep impaction of the cerumen. Cerumen was gently removed using suction. Tympanic membranes are intact following the procedure. Auditory canals appear normal.            IMPRESSION/PLAN:  Bilateral cerumen impaction   Ears clogged, removed cerumen   Vasomotor rhinitis sp Clarifix with some improvement   Recommend Astelin PRN  Follow up in 4-6 months for repeat ear cleaning               Bekah Aldridge [id number]  1950      I have discussed the case, including pertinent history and exam findings with the resident. I have seen and examined the patient and the key elements of the encounter have been performed by me. I agree with the assessment, plan and orders as documented by the resident. Patient here for follow up of medical problems. Remainder of medical problems as per resident note.       [facility], DO  2/24/22

## 2022-03-26 ENCOUNTER — OFFICE VISIT (OUTPATIENT)
Dept: FAMILY MEDICINE CLINIC | Age: 72
End: 2022-03-26
Payer: MEDICARE

## 2022-03-26 VITALS
TEMPERATURE: 98.4 F | BODY MASS INDEX: 33.05 KG/M2 | HEART RATE: 75 BPM | SYSTOLIC BLOOD PRESSURE: 144 MMHG | HEIGHT: 72 IN | WEIGHT: 244 LBS | OXYGEN SATURATION: 95 % | DIASTOLIC BLOOD PRESSURE: 82 MMHG

## 2022-03-26 DIAGNOSIS — J01.90 ACUTE NON-RECURRENT SINUSITIS, UNSPECIFIED LOCATION: Primary | ICD-10-CM

## 2022-03-26 DIAGNOSIS — R09.82 POSTNASAL DRIP: ICD-10-CM

## 2022-03-26 DIAGNOSIS — R09.81 NASAL CONGESTION: ICD-10-CM

## 2022-03-26 PROCEDURE — 3017F COLORECTAL CA SCREEN DOC REV: CPT | Performed by: PHYSICIAN ASSISTANT

## 2022-03-26 PROCEDURE — 99214 OFFICE O/P EST MOD 30 MIN: CPT | Performed by: PHYSICIAN ASSISTANT

## 2022-03-26 PROCEDURE — 96372 THER/PROPH/DIAG INJ SC/IM: CPT | Performed by: PHYSICIAN ASSISTANT

## 2022-03-26 PROCEDURE — 1123F ACP DISCUSS/DSCN MKR DOCD: CPT | Performed by: PHYSICIAN ASSISTANT

## 2022-03-26 PROCEDURE — 4040F PNEUMOC VAC/ADMIN/RCVD: CPT | Performed by: PHYSICIAN ASSISTANT

## 2022-03-26 PROCEDURE — G8484 FLU IMMUNIZE NO ADMIN: HCPCS | Performed by: PHYSICIAN ASSISTANT

## 2022-03-26 PROCEDURE — G8427 DOCREV CUR MEDS BY ELIG CLIN: HCPCS | Performed by: PHYSICIAN ASSISTANT

## 2022-03-26 PROCEDURE — 1036F TOBACCO NON-USER: CPT | Performed by: PHYSICIAN ASSISTANT

## 2022-03-26 PROCEDURE — G8417 CALC BMI ABV UP PARAM F/U: HCPCS | Performed by: PHYSICIAN ASSISTANT

## 2022-03-26 RX ORDER — METHYLPREDNISOLONE ACETATE 40 MG/ML
40 INJECTION, SUSPENSION INTRA-ARTICULAR; INTRALESIONAL; INTRAMUSCULAR; SOFT TISSUE ONCE
Status: COMPLETED | OUTPATIENT
Start: 2022-03-26 | End: 2022-03-26

## 2022-03-26 RX ORDER — PREDNISONE 10 MG/1
TABLET ORAL
Qty: 18 TABLET | Refills: 0 | Status: SHIPPED
Start: 2022-03-26 | End: 2022-07-19

## 2022-03-26 RX ORDER — AMOXICILLIN AND CLAVULANATE POTASSIUM 875; 125 MG/1; MG/1
1 TABLET, FILM COATED ORAL 2 TIMES DAILY
Qty: 20 TABLET | Refills: 0 | Status: SHIPPED | OUTPATIENT
Start: 2022-03-26 | End: 2022-04-05

## 2022-03-26 RX ADMIN — METHYLPREDNISOLONE ACETATE 40 MG: 40 INJECTION, SUSPENSION INTRA-ARTICULAR; INTRALESIONAL; INTRAMUSCULAR; SOFT TISSUE at 09:33

## 2022-03-26 NOTE — PROGRESS NOTES
3/26/22  Latisha Garcia : 1950 Sex: male  Age 80 y.o. Subjective:  Chief Complaint   Patient presents with    Sinus Problem     Started 10 days ago. Otc medication. Nasal spray no relief          HPI:   Latisha Garcia , 80 y.o. male presents to [facility] care for evaluation of sinus congestion, drainage    HPI  21-year-old male presents to [facility] for evaluation of sinus congestion, drainage. The patient has had the symptoms ongoing for about a week and a half now. The patient has been using Symax at home. The patient states he has been using quite a bit of it as of late. The patient has not had any fever, chills. No lightheadedness or dizziness. The patient states that he is coughing and the increased drainage seems to make it worse at night. The patient is not any chest pain or shortness of breath. No back pain or flank pain. ROS:   Unless otherwise stated in this report the patient's positive and negative responses for review of systems for constitutional, eyes, ENT, cardiovascular, respiratory, gastrointestinal, neurological, , musculoskeletal, and integument systems and related systems to the presenting problem are either stated in the history of present illness or were not pertinent or were negative for the symptoms and/or complaints related to the presenting medical problem. Positives and pertinent negatives as per HPI. All others reviewed and are negative.       PMH:     Past Medical History:   Diagnosis Date    BPH (benign prostatic hyperplasia)     Colon polyp 2018    Dr. Matt Miranda DJD (degenerative joint disease) of knee     Hypertension     Spinal stenosis, lumbar        Past Surgical History:   Procedure Laterality Date    CERVICAL LAMINECTOMY      LUMBAR LAMINECTOMY  2010    MYRINGOTOMY Right 2016    Nasal endoscopy with biopsy,biopsy post nasal pharynx    MYRINGOTOMY Right 2019    with right eustachian tube balloon dilation    MYRINGOTOMY AND TYMPANOSTOMY TUBE PLACEMENT Left 12/08/2016    Left myringotomy with T-tube insertion    MYRINGOTOMY AND TYMPANOSTOMY TUBE PLACEMENT Left 03/15/2018    Left myringotomy with tympanostomy tube placement     MYRINGOTOMY AND TYMPANOSTOMY TUBE PLACEMENT Right 1/24/2019    RIGHT MYRINGOTOMY TUBE REPLACEMENT. RIGHT EUSTACHIAN TUBE DILATION performed by Emiliano Garcia DO at [address] Left 03/15/2018    Nasal endoscopy with eustachian tube balloon dilation, left.  RI OFFICE/OUTPT VISIT,PROCEDURE ONLY Left 3/15/2018    LEFT EUSTACHIAN TUBE BALLOON DILATION AND LEFT MYRINGOTOMY WITH T-TUBE INSERTION performed by Emiliano Garcia DO at [facility] 7  2003    x2; hydrocelectomy    TONSILLECTOMY      TOTAL KNEE ARTHROPLASTY Right 01/07/2013    Right TKA  JEWELL Goodwin MD       Family History   Problem Relation Age of Onset    Stroke Mother     Osteoarthritis Mother     Coronary Art Dis Father     Pacemaker Father     Hypertension Father     No Known Problems Sister        Medications:     Current Outpatient Medications:     amoxicillin-clavulanate (AUGMENTIN) 875-125 MG per tablet, Take 1 tablet by mouth 2 times daily for 10 days, Disp: 20 tablet, Rfl: 0    predniSONE (DELTASONE) 10 MG tablet, 3 tabs once daily for 3 days, 2 tabs once daily for 3 days, 1 tab once daily for 3 days, Disp: 18 tablet, Rfl: 0    cloNIDine (CATAPRES) 0.1 MG tablet, TAKE 2 TABLETS BY MOUTH EVERY NIGHT, Disp: 180 tablet, Rfl: 1    tamsulosin (FLOMAX) 0.4 MG capsule, Take 0.4 mg by mouth 2 times daily, Disp: , Rfl:     lisinopril-hydroCHLOROthiazide (PRINZIDE;ZESTORETIC) 20-12.5 MG per tablet, Take 1 tablet by mouth daily, Disp: 90 tablet, Rfl: 1    verapamil (VERELAN) 240 MG extended release capsule, Take 1 capsule by mouth nightly, Disp: 90 capsule, Rfl: 1    levothyroxine (SYNTHROID) 25 MCG tablet, Take 1 tablet by mouth Daily, Disp: 90 tablet, Rfl: 1   fluticasone (FLONASE) 50 MCG/ACT nasal spray, SHAKE LIQUID AND USE 2 SPRAYS IN EACH NOSTRIL EVERY DAY, Disp: 48 g, Rfl: 3    cloNIDine (CATAPRES) 0.1 MG tablet, Take 0.1 mg by mouth 2 times daily, Disp: , Rfl:     Allergies: Allergies   Allergen Reactions    Other      SEASONAL       Social History:     Social History     Tobacco Use    Smoking status: Never Smoker    Smokeless tobacco: Never Used   Vaping Use    Vaping Use: Never used   Substance Use Topics    Alcohol use: No     Alcohol/week: 0.0 standard drinks    Drug use: No       Patient lives at home. Physical Exam:     Vitals:    03/26/22 0923   BP: (!) 144/82   Pulse: 75   Temp: 98.4 °F (36.9 °C)   SpO2: 95%   Weight: 244 lb (110.7 kg)   Height: 6' (1.829 m)       Exam:  Physical Exam  Nurse's notes and vital signs reviewed. The patient is not hypoxic. ? General: Alert, no acute distress, patient resting comfortably Patient is not toxic or lethargic. Skin: Warm, intact, no pallor noted. There is no evidence of rash at this time. Head: Normocephalic, atraumatic  Eye: Normal conjunctiva  Ears, Nose, Throat: Right tympanic membrane clear, left tympanic membrane clear. No drainage or discharge noted. No pre- or post-auricular tenderness, erythema, or swelling noted. Nasal congestion, rhinorrhea, no epistaxis, no blood. No facial erythema  Posterior oropharynx shows erythema and cobblestoning but no evidence of tonsillar hypertrophy, or exudate. the uvula is midline. No trismus or drooling is noted. Moist mucous membranes. Neck: No anterior/posterior lymphadenopathy noted. No erythema, no masses, no fluctuance or induration noted. No meningeal signs. Cardiovascular: Regular Rate and Rhythm  Respiratory: No acute distress, no rhonchi, wheezing or crackles noted. No stridor or retractions are noted.   Neurological: A&O x4, normal speech  Psychiatric: Cooperative         Testing:           Medical Decision Making:     Vital signs reviewed    Past medical history reviewed. Allergies reviewed. Medications reviewed. Patient on arrival does not appear to be in any apparent distress or discomfort. The patient has been seen and evaluated. The patient does not appear to be toxic or lethargic. Patient will be treated with IM injection methylprednisone. The patient will be given Augmentin and prednisone for home. The patient was educated on the proper dosage of motrin and tylenol and the appropriate intervals of each. The patient is to increase fluid intake over the next several days. The patient is to use OTC decongestant as needed. The patient is to return to express care or go directly to the emergency department should any of the signs or symptoms worsen. The patient is to followup with primary care physician in 2-3 days for repeat evaluation. The patient has no other questions or concerns at this time the patient will be discharged home. Clinical Impression:   Bennett Ramirez was seen today for sinus problem. Diagnoses and all orders for this visit:    Acute non-recurrent sinusitis, unspecified location    Nasal congestion    Postnasal drip    Other orders  -     amoxicillin-clavulanate (AUGMENTIN) 875-125 MG per tablet; Take 1 tablet by mouth 2 times daily for 10 days  -     predniSONE (DELTASONE) 10 MG tablet; 3 tabs once daily for 3 days, 2 tabs once daily for 3 days, 1 tab once daily for 3 days  -     methylPREDNISolone acetate (DEPO-MEDROL) injection 40 mg        The patient is to call for any concerns or return if any of the signs or symptoms worsen. The patient is to follow-up with PCP in the next 2-3 days for repeat evaluation repeat assessment or go directly to the emergency department.      SIGNATURE: Zeeshan Bruce III, PA-C

## 2022-06-14 ENCOUNTER — OFFICE VISIT (OUTPATIENT)
Dept: ENT CLINIC | Age: 72
End: 2022-06-14
Payer: MEDICARE

## 2022-06-14 VITALS
SYSTOLIC BLOOD PRESSURE: 144 MMHG | HEART RATE: 66 BPM | WEIGHT: 241 LBS | HEIGHT: 72 IN | BODY MASS INDEX: 32.64 KG/M2 | DIASTOLIC BLOOD PRESSURE: 81 MMHG

## 2022-06-14 DIAGNOSIS — H65.491 COME (CHRONIC OTITIS MEDIA WITH EFFUSION), RIGHT: ICD-10-CM

## 2022-06-14 DIAGNOSIS — H61.23 BILATERAL IMPACTED CERUMEN: ICD-10-CM

## 2022-06-14 DIAGNOSIS — H69.83 ETD (EUSTACHIAN TUBE DYSFUNCTION), BILATERAL: Primary | ICD-10-CM

## 2022-06-14 PROCEDURE — G8427 DOCREV CUR MEDS BY ELIG CLIN: HCPCS | Performed by: OTOLARYNGOLOGY

## 2022-06-14 PROCEDURE — 99213 OFFICE O/P EST LOW 20 MIN: CPT | Performed by: OTOLARYNGOLOGY

## 2022-06-14 PROCEDURE — G8417 CALC BMI ABV UP PARAM F/U: HCPCS | Performed by: OTOLARYNGOLOGY

## 2022-06-14 PROCEDURE — 3017F COLORECTAL CA SCREEN DOC REV: CPT | Performed by: OTOLARYNGOLOGY

## 2022-06-14 PROCEDURE — 1123F ACP DISCUSS/DSCN MKR DOCD: CPT | Performed by: OTOLARYNGOLOGY

## 2022-06-14 PROCEDURE — 1036F TOBACCO NON-USER: CPT | Performed by: OTOLARYNGOLOGY

## 2022-06-14 RX ORDER — CIPROFLOXACIN HYDROCHLORIDE 3.5 MG/ML
4 SOLUTION/ DROPS TOPICAL 2 TIMES DAILY
Qty: 1 EACH | Refills: 0 | Status: SHIPPED | OUTPATIENT
Start: 2022-06-14 | End: 2022-06-19

## 2022-06-14 ASSESSMENT — ENCOUNTER SYMPTOMS
SHORTNESS OF BREATH: 0
VOICE CHANGE: 0
TROUBLE SWALLOWING: 0
RHINORRHEA: 0
COUGH: 0
VOMITING: 0

## 2022-06-14 NOTE — PROGRESS NOTES
tablet, Rfl: 0    cloNIDine (CATAPRES) 0.1 MG tablet, TAKE 2 TABLETS BY MOUTH EVERY NIGHT, Disp: 180 tablet, Rfl: 1    tamsulosin (FLOMAX) 0.4 MG capsule, Take 0.4 mg by mouth 2 times daily, Disp: , Rfl:     lisinopril-hydroCHLOROthiazide (PRINZIDE;ZESTORETIC) 20-12.5 MG per tablet, Take 1 tablet by mouth daily, Disp: 90 tablet, Rfl: 1    verapamil (VERELAN) 240 MG extended release capsule, Take 1 capsule by mouth nightly, Disp: 90 capsule, Rfl: 1    levothyroxine (SYNTHROID) 25 MCG tablet, Take 1 tablet by mouth Daily, Disp: 90 tablet, Rfl: 1    fluticasone (FLONASE) 50 MCG/ACT nasal spray, SHAKE LIQUID AND USE 2 SPRAYS IN EACH NOSTRIL EVERY DAY, Disp: 48 g, Rfl: 3    cloNIDine (CATAPRES) 0.1 MG tablet, Take 0.1 mg by mouth 2 times daily, Disp: , Rfl:   Other  Social History     Tobacco Use    Smoking status: Never Smoker    Smokeless tobacco: Never Used   Vaping Use    Vaping Use: Never used   Substance Use Topics    Alcohol use: No     Alcohol/week: 0.0 standard drinks    Drug use: No     Family History   Problem Relation Age of Onset    Stroke Mother     Osteoarthritis Mother     Coronary Art Dis Father     Pacemaker Father     Hypertension Father     No Known Problems Sister        Review of Systems   Constitutional: Negative for chills and fever. HENT: Negative for congestion, ear discharge, hearing loss, postnasal drip, rhinorrhea, trouble swallowing and voice change. Respiratory: Negative for cough and shortness of breath. Cardiovascular: Negative for chest pain and palpitations. Gastrointestinal: Negative for vomiting. Skin: Negative for rash. Allergic/Immunologic: Negative for environmental allergies. Neurological: Negative for dizziness and headaches. Hematological: Does not bruise/bleed easily. All other systems reviewed and are negative.       BP (!) 144/81   Pulse 66   Ht 6' (1.829 m)   Wt 241 lb (109.3 kg)   BMI 32.69 kg/m²   Physical Exam  Vitals and nursing note reviewed. Constitutional:       Appearance: He is well-developed. HENT:      Head: Normocephalic and atraumatic. No contusion, masses or laceration. Jaw: No tenderness or swelling. Right Ear: Tympanic membrane, ear canal and external ear normal. No drainage. There is impacted cerumen. A PE tube is present. Left Ear: Tympanic membrane, ear canal and external ear normal. No drainage. There is impacted cerumen. A PE tube is present. Nose: No congestion or rhinorrhea. Right Nostril: No epistaxis. Left Nostril: No epistaxis. Right Turbinates: Not enlarged or swollen. Left Turbinates: Not enlarged or swollen. Mouth/Throat:      Mouth: Mucous membranes are moist. No oral lesions. Dentition: No gum lesions. Pharynx: No oropharyngeal exudate or posterior oropharyngeal erythema. Eyes:      Pupils: Pupils are equal, round, and reactive to light. Neck:      Thyroid: No thyromegaly. Trachea: No tracheal deviation. Cardiovascular:      Rate and Rhythm: Normal rate. Pulmonary:      Effort: Pulmonary effort is normal. No respiratory distress. Musculoskeletal:         General: Normal range of motion. Cervical back: Normal range of motion. Lymphadenopathy:      Cervical: No cervical adenopathy. Skin:     General: Skin is warm. Findings: No erythema. Neurological:      Mental Status: He is alert. Cranial Nerves: No cranial nerve deficit. Procedure: Cerumen Impaction    Pre-op: Cerumen Impaction    Post Op: Same    Procedure: Cerumen Impaction removal    Surgeon: Anastasia Ashraf    Procedure: Under microscopic assistance large cerumen impaction was removed using gentle suction and curette.  TM intact B/L, Pt tolerated procedure well    Complications: None    Blood Loss Minimial        IMPRESSION/PLAN:  Bilateral cerumen impaction removed without complications  bilateral t tubes in place and functioning  Follow up in 6 months for repeat ear cleaning  cipro drops 4 drops left ear bid x 5 days      Dr. Heydi Quintanilla.  Otolaryngology Facial Plastic Surgery  :  [facility] Otolaryngology/Facial Plastic Surgery Residency  Associate Clinical Professor:  Jeanine Person, [facility]

## 2022-06-22 RX ORDER — TAMSULOSIN HYDROCHLORIDE 0.4 MG/1
CAPSULE ORAL
Qty: 180 CAPSULE | Refills: 1 | Status: SHIPPED
Start: 2022-06-22 | End: 2022-07-19 | Stop reason: SDUPTHER

## 2022-07-12 DIAGNOSIS — R09.82 POSTNASAL DRIP: ICD-10-CM

## 2022-07-12 RX ORDER — MONTELUKAST SODIUM 10 MG/1
TABLET ORAL
Qty: 90 TABLET | Refills: 1 | Status: SHIPPED
Start: 2022-07-12 | End: 2022-07-19

## 2022-07-19 ENCOUNTER — OFFICE VISIT (OUTPATIENT)
Dept: PRIMARY CARE CLINIC | Age: 72
End: 2022-07-19
Payer: MEDICARE

## 2022-07-19 ENCOUNTER — TELEPHONE (OUTPATIENT)
Dept: ENT CLINIC | Age: 72
End: 2022-07-19

## 2022-07-19 VITALS
HEIGHT: 72 IN | BODY MASS INDEX: 32.1 KG/M2 | TEMPERATURE: 98.5 F | OXYGEN SATURATION: 98 % | SYSTOLIC BLOOD PRESSURE: 118 MMHG | DIASTOLIC BLOOD PRESSURE: 68 MMHG | HEART RATE: 68 BPM | WEIGHT: 237 LBS

## 2022-07-19 DIAGNOSIS — Z00.00 MEDICARE ANNUAL WELLNESS VISIT, SUBSEQUENT: Primary | ICD-10-CM

## 2022-07-19 DIAGNOSIS — E03.9 ACQUIRED HYPOTHYROIDISM: ICD-10-CM

## 2022-07-19 DIAGNOSIS — E55.9 VITAMIN D INSUFFICIENCY: ICD-10-CM

## 2022-07-19 DIAGNOSIS — R73.01 IMPAIRED FASTING GLUCOSE: ICD-10-CM

## 2022-07-19 DIAGNOSIS — I10 ESSENTIAL HYPERTENSION: ICD-10-CM

## 2022-07-19 DIAGNOSIS — R35.1 NOCTURIA: ICD-10-CM

## 2022-07-19 DIAGNOSIS — R09.82 POSTNASAL DRIP: ICD-10-CM

## 2022-07-19 LAB
ALBUMIN SERPL-MCNC: 4.3 G/DL (ref 3.5–5.2)
ALP BLD-CCNC: 53 U/L (ref 40–129)
ALT SERPL-CCNC: 7 U/L (ref 0–40)
ANION GAP SERPL CALCULATED.3IONS-SCNC: 15 MMOL/L (ref 7–16)
AST SERPL-CCNC: 14 U/L (ref 0–39)
BASOPHILS ABSOLUTE: 0.14 E9/L (ref 0–0.2)
BASOPHILS RELATIVE PERCENT: 1.6 % (ref 0–2)
BILIRUB SERPL-MCNC: 0.7 MG/DL (ref 0–1.2)
BILIRUBIN URINE: NEGATIVE
BLOOD, URINE: NEGATIVE
BUN BLDV-MCNC: 13 MG/DL (ref 6–23)
CALCIUM SERPL-MCNC: 9.3 MG/DL (ref 8.6–10.2)
CHLORIDE BLD-SCNC: 103 MMOL/L (ref 98–107)
CHOLESTEROL, TOTAL: 150 MG/DL (ref 0–199)
CLARITY: CLEAR
CO2: 23 MMOL/L (ref 22–29)
COLOR: YELLOW
CREAT SERPL-MCNC: 1.2 MG/DL (ref 0.7–1.2)
EOSINOPHILS ABSOLUTE: 0.3 E9/L (ref 0.05–0.5)
EOSINOPHILS RELATIVE PERCENT: 3.5 % (ref 0–6)
GFR AFRICAN AMERICAN: >60
GFR NON-AFRICAN AMERICAN: 60 ML/MIN/1.73
GLUCOSE BLD-MCNC: 108 MG/DL (ref 74–99)
GLUCOSE URINE: NEGATIVE MG/DL
HBA1C MFR BLD: 5.3 % (ref 4–5.6)
HCT VFR BLD CALC: 41.3 % (ref 37–54)
HDLC SERPL-MCNC: 39 MG/DL
HEMOGLOBIN: 14 G/DL (ref 12.5–16.5)
IMMATURE GRANULOCYTES #: 0.07 E9/L
IMMATURE GRANULOCYTES %: 0.8 % (ref 0–5)
KETONES, URINE: NEGATIVE MG/DL
LDL CHOLESTEROL CALCULATED: 94 MG/DL (ref 0–99)
LEUKOCYTE ESTERASE, URINE: NEGATIVE
LYMPHOCYTES ABSOLUTE: 1.46 E9/L (ref 1.5–4)
LYMPHOCYTES RELATIVE PERCENT: 16.9 % (ref 20–42)
MCH RBC QN AUTO: 30.4 PG (ref 26–35)
MCHC RBC AUTO-ENTMCNC: 33.9 % (ref 32–34.5)
MCV RBC AUTO: 89.8 FL (ref 80–99.9)
MONOCYTES ABSOLUTE: 0.52 E9/L (ref 0.1–0.95)
MONOCYTES RELATIVE PERCENT: 6 % (ref 2–12)
NEUTROPHILS ABSOLUTE: 6.14 E9/L (ref 1.8–7.3)
NEUTROPHILS RELATIVE PERCENT: 71.2 % (ref 43–80)
NITRITE, URINE: NEGATIVE
PDW BLD-RTO: 13.2 FL (ref 11.5–15)
PH UA: 6 (ref 5–9)
PLATELET # BLD: 234 E9/L (ref 130–450)
PMV BLD AUTO: 10.3 FL (ref 7–12)
POTASSIUM SERPL-SCNC: 4.1 MMOL/L (ref 3.5–5)
PROTEIN UA: NEGATIVE MG/DL
RBC # BLD: 4.6 E12/L (ref 3.8–5.8)
SODIUM BLD-SCNC: 141 MMOL/L (ref 132–146)
SPECIFIC GRAVITY UA: 1.02 (ref 1–1.03)
TOTAL PROTEIN: 7.1 G/DL (ref 6.4–8.3)
TRIGL SERPL-MCNC: 85 MG/DL (ref 0–149)
TSH SERPL DL<=0.05 MIU/L-ACNC: 2.87 UIU/ML (ref 0.27–4.2)
UROBILINOGEN, URINE: 1 E.U./DL
VITAMIN D 25-HYDROXY: 55 NG/ML (ref 30–100)
VLDLC SERPL CALC-MCNC: 17 MG/DL
WBC # BLD: 8.6 E9/L (ref 4.5–11.5)

## 2022-07-19 PROCEDURE — 3017F COLORECTAL CA SCREEN DOC REV: CPT | Performed by: FAMILY MEDICINE

## 2022-07-19 PROCEDURE — 1123F ACP DISCUSS/DSCN MKR DOCD: CPT | Performed by: FAMILY MEDICINE

## 2022-07-19 PROCEDURE — G0439 PPPS, SUBSEQ VISIT: HCPCS | Performed by: FAMILY MEDICINE

## 2022-07-19 RX ORDER — CLONIDINE HYDROCHLORIDE 0.1 MG/1
0.2 TABLET ORAL EVERY EVENING
Qty: 180 TABLET | Refills: 1 | Status: SHIPPED | OUTPATIENT
Start: 2022-07-19

## 2022-07-19 RX ORDER — VERAPAMIL HYDROCHLORIDE 240 MG/1
240 CAPSULE, EXTENDED RELEASE ORAL NIGHTLY
Qty: 90 CAPSULE | Refills: 1 | Status: SHIPPED | OUTPATIENT
Start: 2022-07-19 | End: 2023-04-15

## 2022-07-19 RX ORDER — TAMSULOSIN HYDROCHLORIDE 0.4 MG/1
0.8 CAPSULE ORAL DAILY
Qty: 180 CAPSULE | Refills: 1 | Status: SHIPPED
Start: 2022-07-19 | End: 2022-09-20

## 2022-07-19 RX ORDER — LISINOPRIL AND HYDROCHLOROTHIAZIDE 20; 12.5 MG/1; MG/1
1 TABLET ORAL DAILY
Qty: 90 TABLET | Refills: 1 | Status: SHIPPED | OUTPATIENT
Start: 2022-07-19

## 2022-07-19 RX ORDER — LEVOTHYROXINE SODIUM 0.03 MG/1
25 TABLET ORAL DAILY
Qty: 90 TABLET | Refills: 1 | Status: SHIPPED | OUTPATIENT
Start: 2022-07-19

## 2022-07-19 RX ORDER — FLUTICASONE PROPIONATE 50 MCG
2 SPRAY, SUSPENSION (ML) NASAL DAILY
Qty: 48 G | Refills: 3 | Status: SHIPPED | OUTPATIENT
Start: 2022-07-19

## 2022-07-19 SDOH — ECONOMIC STABILITY: FOOD INSECURITY: WITHIN THE PAST 12 MONTHS, YOU WORRIED THAT YOUR FOOD WOULD RUN OUT BEFORE YOU GOT MONEY TO BUY MORE.: PATIENT DECLINED

## 2022-07-19 SDOH — ECONOMIC STABILITY: FOOD INSECURITY: WITHIN THE PAST 12 MONTHS, THE FOOD YOU BOUGHT JUST DIDN'T LAST AND YOU DIDN'T HAVE MONEY TO GET MORE.: PATIENT DECLINED

## 2022-07-19 ASSESSMENT — PATIENT HEALTH QUESTIONNAIRE - PHQ9
2. FEELING DOWN, DEPRESSED OR HOPELESS: 0
SUM OF ALL RESPONSES TO PHQ QUESTIONS 1-9: 0
1. LITTLE INTEREST OR PLEASURE IN DOING THINGS: 0
SUM OF ALL RESPONSES TO PHQ9 QUESTIONS 1 & 2: 0
SUM OF ALL RESPONSES TO PHQ QUESTIONS 1-9: 0

## 2022-07-19 ASSESSMENT — LIFESTYLE VARIABLES: HOW OFTEN DO YOU HAVE A DRINK CONTAINING ALCOHOL: NEVER

## 2022-07-19 ASSESSMENT — SOCIAL DETERMINANTS OF HEALTH (SDOH): HOW HARD IS IT FOR YOU TO PAY FOR THE VERY BASICS LIKE FOOD, HOUSING, MEDICAL CARE, AND HEATING?: PATIENT DECLINED

## 2022-07-19 NOTE — PATIENT INSTRUCTIONS
Personalized Preventive Plan for Radha Orozco - 7/19/2022  Medicare offers a range of preventive health benefits. Some of the tests and screenings are paid in full while other may be subject to a deductible, co-insurance, and/or copay. Some of these benefits include a comprehensive review of your medical history including lifestyle, illnesses that may run in your family, and various assessments and screenings as appropriate. After reviewing your medical record and screening and assessments performed today your provider may have ordered immunizations, labs, imaging, and/or referrals for you. A list of these orders (if applicable) as well as your Preventive Care list are included within your After Visit Summary for your review. Other Preventive Recommendations:    A preventive eye exam performed by an eye specialist is recommended every 1-2 years to screen for glaucoma; cataracts, macular degeneration, and other eye disorders. A preventive dental visit is recommended every 6 months. Try to get at least 150 minutes of exercise per week or 10,000 steps per day on a pedometer . Order or download the FREE \"Exercise & Physical Activity: Your Everyday Guide\" from [employer]. Call 2-916.523.8309 or search [employer] online. You need 4980-2997 mg of calcium and 8641-5779 IU of vitamin D per day. It is possible to meet your calcium requirement with diet alone, but a vitamin D supplement is usually necessary to meet this goal.  When exposed to the sun, use a sunscreen that protects against both UVA and UVB radiation with an SPF of 30 or greater. Reapply every 2 to 3 hours or after sweating, drying off with a towel, or swimming. Always wear a seat belt when traveling in a car. Always wear a helmet when riding a bicycle or motorcycle.

## 2022-07-19 NOTE — PROGRESS NOTES
Medicare Annual Wellness Visit    Jose Cassidy is here for Medicare AWV    Assessment & Plan   Medicare annual wellness visit, subsequent  HRA reviewed and addressed. UTD on HM. Due for fasting labs and refills. Essential hypertension  -     CBC with Auto Differential; Future  -     Comprehensive Metabolic Panel; Future  -     Lipid Panel; Future  -     TSH; Future  -     Urinalysis; Future  -     verapamil (VERELAN) 240 MG extended release capsule; Take 1 capsule by mouth nightly, Disp-90 capsule, R-1Normal  -     cloNIDine (CATAPRES) 0.1 MG tablet; Take 2 tablets by mouth every evening, Disp-180 tablet, R-1Normal  -     lisinopril-hydroCHLOROthiazide (PRINZIDE;ZESTORETIC) 20-12.5 MG per tablet; Take 1 tablet by mouth in the morning., Disp-90 tablet, R-1Normal  Well controlled now with some weight loss. Will continue to monitor. Acquired hypothyroidism  -     CBC with Auto Differential; Future  -     Comprehensive Metabolic Panel; Future  -     Lipid Panel; Future  -     TSH; Future  -     Urinalysis; Future  -     levothyroxine (SYNTHROID) 25 MCG tablet; Take 1 tablet by mouth in the morning., Disp-90 tablet, R-1Normal    Nocturia  -     tamsulosin (FLOMAX) 0.4 MG capsule; Take 2 capsules by mouth in the morning., Disp-180 capsule, R-1Normal    Postnasal drip  -     fluticasone (FLONASE) 50 MCG/ACT nasal spray; 2 sprays by Nasal route in the morning., Disp-48 g, R-3Normal    Impaired fasting glucose  -     Hemoglobin A1C; Future    Vitamin D insufficiency  -     Vitamin D 25 Hydroxy; Future        Recommendations for Preventive Services Due: see orders and patient instructions/AVS.  Recommended screening schedule for the next 5-10 years is provided to the patient in written form: see Patient Instructions/AVS.     Return in about 6 months (around 1/19/2023), or if symptoms worsen or fail to improve, for Chronic medical conditions.      Subjective   The following acute and/or chronic problems were also addressed today:  Only having difficulty with pressure/decreased hearing in L ear in the last week. Patient's complete Health Risk Assessment and screening values have been reviewed and are found in Flowsheets. The following problems were reviewed today and where indicated follow up appointments were made and/or referrals ordered.     Positive Risk Factor Screenings with Interventions:    Fall Risk:  Do you feel unsteady or are you worried about falling? : (!) yes  2 or more falls in past year?: no  Fall with injury in past year?: no   Fall Risk Interventions:    Home safety tips provided            General Health and ACP:  General  In general, how would you say your health is?: Good  In the past 7 days, have you experienced any of the following: New or Increased Pain, New or Increased Fatigue, Loneliness, Social Isolation, Stress or Anger?: No  Do you get the social and emotional support that you need?: Yes  Do you have a Living Will?: (!) No    Advance Directives       Power of  Living Will ACP-Advance Directive ACP-Power of     Not on File Not on File Not on File Not on File        General Health Risk Interventions:  No Living Will: [address] addressed with patient today    Health Habits/Nutrition:  Physical Activity: Inactive    Days of Exercise per Week: 0 days    Minutes of Exercise per Session: 0 min     Have you lost any weight without trying in the past 3 months?: (!) Yes  Body mass index: (!) 32.14  Have you seen the dentist within the past year?: Yes  Health Habits/Nutrition Interventions:  Nutritional issues:  he has been cutting back on snacking and watching excess intake    Hearing/Vision:  Do you or your family notice any trouble with your hearing that hasn't been managed with hearing aids?: (!) Yes  Do you have difficulty driving, watching TV, or doing any of your daily activities because of your eyesight?: No  Have you had an eye exam within the past year?: (!) Behavior: Behavior normal.            Allergies   Allergen Reactions    Other      SEASONAL     Prior to Visit Medications    Medication Sig Taking? Authorizing Provider   verapamil (VERELAN) 240 MG extended release capsule Take 1 capsule by mouth nightly Yes Lisha Juarez DO   cloNIDine (CATAPRES) 0.1 MG tablet Take 2 tablets by mouth every evening Yes Lisha Juarez DO   lisinopril-hydroCHLOROthiazide (PRINZIDE;ZESTORETIC) 20-12.5 MG per tablet Take 1 tablet by mouth in the morning. Yes Lisha Juarez DO   levothyroxine (SYNTHROID) 25 MCG tablet Take 1 tablet by mouth in the morning. Yes Lisha Juarez DO   fluticasone (FLONASE) 50 MCG/ACT nasal spray 2 sprays by Nasal route in the morning. Yes Lisha Juarez DO   tamsulosin (FLOMAX) 0.4 MG capsule Take 2 capsules by mouth in the morning.  Yes Lisha Juarez DO   tamsulosin (FLOMAX) 0.4 MG capsule TAKE 1 CAPSULE BY MOUTH DAILY  Lisha Juarez DO   tamsulosin (FLOMAX) 0.4 MG capsule Take 1 capsule by mouth daily  Lisha Juarez DO       CareTeam (Including outside providers/suppliers regularly involved in providing care):   Patient Care Team:  Shorty Castillo DO as PCP - General (Family Medicine)  Shorty Castillo DO as PCP - [facility] Empaneled Provider  Braydon Olsen DO as Consulting Physician (Otolaryngology)     Reviewed and updated this visit:  Tobacco  Allergies  Meds  Problems  Med Hx  Surg Hx  Soc Hx  Fam Hx

## 2022-07-19 NOTE — TELEPHONE ENCOUNTER
Saw fp today and told dried blood in tube left ear . Told to see Dr Jordy Santacruz. Asked pt if he had any drops left and he does.

## 2022-07-22 ENCOUNTER — TELEPHONE (OUTPATIENT)
Dept: ENT CLINIC | Age: 72
End: 2022-07-22

## 2022-07-22 NOTE — TELEPHONE ENCOUNTER
Pt called and stated that he has been using the drops for 5 days and now he can not hear. Denies any drainage with the use of the drops.      Spoke to Dr. Jose Manuel Kruse and continue the drops and we will see him next week      Appointment made

## 2022-07-26 ENCOUNTER — OFFICE VISIT (OUTPATIENT)
Dept: ENT CLINIC | Age: 72
End: 2022-07-26
Payer: MEDICARE

## 2022-07-26 VITALS
BODY MASS INDEX: 32.52 KG/M2 | SYSTOLIC BLOOD PRESSURE: 136 MMHG | DIASTOLIC BLOOD PRESSURE: 81 MMHG | WEIGHT: 240.1 LBS | HEIGHT: 72 IN | HEART RATE: 62 BPM

## 2022-07-26 DIAGNOSIS — J30.0 VASOMOTOR RHINITIS: ICD-10-CM

## 2022-07-26 DIAGNOSIS — H69.83 ETD (EUSTACHIAN TUBE DYSFUNCTION), BILATERAL: ICD-10-CM

## 2022-07-26 DIAGNOSIS — H61.23 BILATERAL IMPACTED CERUMEN: Primary | ICD-10-CM

## 2022-07-26 PROCEDURE — G8417 CALC BMI ABV UP PARAM F/U: HCPCS | Performed by: OTOLARYNGOLOGY

## 2022-07-26 PROCEDURE — 99213 OFFICE O/P EST LOW 20 MIN: CPT | Performed by: OTOLARYNGOLOGY

## 2022-07-26 PROCEDURE — 3017F COLORECTAL CA SCREEN DOC REV: CPT | Performed by: OTOLARYNGOLOGY

## 2022-07-26 PROCEDURE — 1123F ACP DISCUSS/DSCN MKR DOCD: CPT | Performed by: OTOLARYNGOLOGY

## 2022-07-26 PROCEDURE — G8427 DOCREV CUR MEDS BY ELIG CLIN: HCPCS | Performed by: OTOLARYNGOLOGY

## 2022-07-26 PROCEDURE — 1036F TOBACCO NON-USER: CPT | Performed by: OTOLARYNGOLOGY

## 2022-07-26 ASSESSMENT — ENCOUNTER SYMPTOMS
VOICE CHANGE: 0
COUGH: 0
VOMITING: 0
SHORTNESS OF BREATH: 0
TROUBLE SWALLOWING: 0
RHINORRHEA: 0

## 2022-07-26 NOTE — PROGRESS NOTES
[facility] Otolaryngology  Dr. Tom Craig. Preet Amaral. Ms.Ed        Patient Name:  Alexandra Gould  :  1950     CHIEF C/O:    Chief Complaint   Patient presents with    Hearing Problem     L ear feels \"blocked\"       HISTORY OBTAINED FROM:  patient    HISTORY OF PRESENT ILLNESS:       Ky Hoover is a 71y.o. year old male, here today for follow up of left ear been clogged for 1 week without improvement on ciprodex. Past Medical History:   Diagnosis Date    BPH (benign prostatic hyperplasia)     Colon polyp 2018    Dr. Pratibha VALLED (degenerative joint disease) of knee     Hypertension     Spinal stenosis, lumbar      Past Surgical History:   Procedure Laterality Date    CERVICAL LAMINECTOMY      LUMBAR LAMINECTOMY      MYRINGOTOMY Right 2016    Nasal endoscopy with biopsy,biopsy post nasal pharynx    MYRINGOTOMY Right 2019    with right eustachian tube balloon dilation    MYRINGOTOMY AND TYMPANOSTOMY TUBE PLACEMENT Left 2016    Left myringotomy with T-tube insertion    MYRINGOTOMY AND TYMPANOSTOMY TUBE PLACEMENT Left 03/15/2018    Left myringotomy with tympanostomy tube placement     MYRINGOTOMY AND TYMPANOSTOMY TUBE PLACEMENT Right 2019    RIGHT MYRINGOTOMY TUBE REPLACEMENT. RIGHT EUSTACHIAN TUBE DILATION performed by Alvarez Mendoza DO at [address] [place] [address] Left 03/15/2018    Nasal endoscopy with eustachian tube balloon dilation, left. [place] OFFICE/OUTPT VISIT,PROCEDURE ONLY Left 3/15/2018    LEFT EUSTACHIAN TUBE BALLOON DILATION AND LEFT MYRINGOTOMY WITH T-TUBE INSERTION performed by Alvarez Mendoza DO at [address] [place] [address]  2003    x2; hydrocelectomy    TONSILLECTOMY      TOTAL KNEE ARTHROPLASTY Right 2013    Right TKA  JEWELL Padgett MD       Current Outpatient Medications:     verapamil (VERELAN) 240 MG extended release capsule, Take 1 capsule by mouth nightly, Disp: 90 capsule, Rfl: 1    cloNIDine (CATAPRES) 0.1 MG tablet, Take 2 tablets by mouth every evening, Disp: 180 tablet, Rfl: 1    lisinopril-hydroCHLOROthiazide (PRINZIDE;ZESTORETIC) 20-12.5 MG per tablet, Take 1 tablet by mouth in the morning., Disp: 90 tablet, Rfl: 1    levothyroxine (SYNTHROID) 25 MCG tablet, Take 1 tablet by mouth in the morning., Disp: 90 tablet, Rfl: 1    fluticasone (FLONASE) 50 MCG/ACT nasal spray, 2 sprays by Nasal route in the morning., Disp: 48 g, Rfl: 3    tamsulosin (FLOMAX) 0.4 MG capsule, Take 2 capsules by mouth in the morning., Disp: 180 capsule, Rfl: 1  Other  Social History     Tobacco Use    Smoking status: Never    Smokeless tobacco: Never   Vaping Use    Vaping Use: Never used   Substance Use Topics    Alcohol use: No     Alcohol/week: 0.0 standard drinks    Drug use: No     Family History   Problem Relation Age of Onset    Stroke Mother     Osteoarthritis Mother     Coronary Art Dis Father     Pacemaker Father     Hypertension Father     No Known Problems Sister        Review of Systems   Constitutional:  Negative for chills and fever. HENT:  Negative for congestion, ear discharge, hearing loss, postnasal drip, rhinorrhea, trouble swallowing and voice change. Respiratory:  Negative for cough and shortness of breath. Cardiovascular:  Negative for chest pain and palpitations. Gastrointestinal:  Negative for vomiting. Skin:  Negative for rash. Allergic/Immunologic: Negative for environmental allergies. Neurological:  Negative for dizziness and headaches. Hematological:  Does not bruise/bleed easily. All other systems reviewed and are negative. /81 (Site: Left Upper Arm, Position: Sitting, Cuff Size: Large Adult)   Pulse 62   Ht 6' (1.829 m)   Wt 240 lb 1.6 oz (108.9 kg)   BMI 32.56 kg/m²   Physical Exam  Vitals and nursing note reviewed. Constitutional:       Appearance: He is well-developed. HENT:      Head: Normocephalic and atraumatic. No contusion, masses or laceration. Jaw: No tenderness or swelling. Right Ear: Tympanic membrane, ear canal and external ear normal. No drainage. A PE tube is present. Left Ear: Tympanic membrane, ear canal and external ear normal. No drainage. A PE tube is present. Ears:        Nose: No congestion or rhinorrhea. Right Nostril: No epistaxis. Left Nostril: No epistaxis. Right Turbinates: Not enlarged or swollen. Left Turbinates: Not enlarged or swollen. Mouth/Throat:      Mouth: Mucous membranes are moist. No oral lesions. Dentition: No gum lesions. Pharynx: No oropharyngeal exudate or posterior oropharyngeal erythema. Eyes:      Pupils: Pupils are equal, round, and reactive to light. Neck:      Thyroid: No thyromegaly. Trachea: No tracheal deviation. Cardiovascular:      Rate and Rhythm: Normal rate. Pulmonary:      Effort: Pulmonary effort is normal. No respiratory distress. Musculoskeletal:         General: Normal range of motion. Cervical back: Normal range of motion. Lymphadenopathy:      Cervical: No cervical adenopathy. Skin:     General: Skin is warm. Findings: No erythema. Neurological:      Mental Status: He is alert. Cranial Nerves: No cranial nerve deficit. IMPRESSION/PLAN:  L tube blocked with dried blood  Recommend debrox  If not resolving may need tube replacement    Electronically signed by Emmy aWyne DO on 7/26/2022 at 10:54 AM            [address] [place] [address]  1950      I have discussed the case, including pertinent history and exam findings with the resident. I have seen and examined the patient and the key elements of the encounter have been performed by me. I agree with the assessment, plan and orders as documented by the resident. Patient here for follow up of medical problems. Remainder of medical problems as per resident note.       Dale Flores DO  8/25/22

## 2022-08-05 ENCOUNTER — OFFICE VISIT (OUTPATIENT)
Dept: ENT CLINIC | Age: 72
End: 2022-08-05
Payer: MEDICARE

## 2022-08-05 VITALS
HEIGHT: 72 IN | SYSTOLIC BLOOD PRESSURE: 126 MMHG | HEART RATE: 61 BPM | WEIGHT: 240 LBS | BODY MASS INDEX: 32.51 KG/M2 | DIASTOLIC BLOOD PRESSURE: 73 MMHG

## 2022-08-05 DIAGNOSIS — H69.83 ETD (EUSTACHIAN TUBE DYSFUNCTION), BILATERAL: Primary | ICD-10-CM

## 2022-08-05 PROCEDURE — 1123F ACP DISCUSS/DSCN MKR DOCD: CPT | Performed by: OTOLARYNGOLOGY

## 2022-08-05 PROCEDURE — G8427 DOCREV CUR MEDS BY ELIG CLIN: HCPCS | Performed by: OTOLARYNGOLOGY

## 2022-08-05 PROCEDURE — 1036F TOBACCO NON-USER: CPT | Performed by: OTOLARYNGOLOGY

## 2022-08-05 PROCEDURE — 99214 OFFICE O/P EST MOD 30 MIN: CPT | Performed by: OTOLARYNGOLOGY

## 2022-08-05 PROCEDURE — G8417 CALC BMI ABV UP PARAM F/U: HCPCS | Performed by: OTOLARYNGOLOGY

## 2022-08-05 PROCEDURE — 3017F COLORECTAL CA SCREEN DOC REV: CPT | Performed by: OTOLARYNGOLOGY

## 2022-08-05 RX ORDER — CIPROFLOXACIN AND DEXAMETHASONE 3; 1 MG/ML; MG/ML
4 SUSPENSION/ DROPS AURICULAR (OTIC) 2 TIMES DAILY
Qty: 7.5 ML | Refills: 0 | Status: SHIPPED | OUTPATIENT
Start: 2022-08-05 | End: 2022-08-15

## 2022-08-05 ASSESSMENT — ENCOUNTER SYMPTOMS
SHORTNESS OF BREATH: 0
VOMITING: 0
RHINORRHEA: 0
COUGH: 0
TROUBLE SWALLOWING: 0
VOICE CHANGE: 0

## 2022-08-05 NOTE — PROGRESS NOTES
[facility] Otolaryngology  Dr. En Coleman. Amy Found. Ms.Ed        Patient Name:  Rashaad   :  1950     CHIEF C/O:    Chief Complaint   Patient presents with    Follow-up     Pt states hearing is getting worse. HISTORY OBTAINED FROM:  patient    HISTORY OF PRESENT ILLNESS:       Fan Jiménez is a 71y.o. year old male, here today for follow up of left ear. Pt reports  has been clogged for 3 weeks without improvement. Used ciprodex before last visit 22 and Debrox since then, but his ear still feels full. Dry blood was noted around left PE tube last visit. Past Medical History:   Diagnosis Date    BPH (benign prostatic hyperplasia)     Colon polyp 2018    Dr. Dilip WEINER (degenerative joint disease) of knee     Hypertension     Spinal stenosis, lumbar      Past Surgical History:   Procedure Laterality Date    CERVICAL LAMINECTOMY      LUMBAR LAMINECTOMY      MYRINGOTOMY Right 2016    Nasal endoscopy with biopsy,biopsy post nasal pharynx    MYRINGOTOMY Right 2019    with right eustachian tube balloon dilation    MYRINGOTOMY AND TYMPANOSTOMY TUBE PLACEMENT Left 2016    Left myringotomy with T-tube insertion    MYRINGOTOMY AND TYMPANOSTOMY TUBE PLACEMENT Left 03/15/2018    Left myringotomy with tympanostomy tube placement     MYRINGOTOMY AND TYMPANOSTOMY TUBE PLACEMENT Right 2019    RIGHT MYRINGOTOMY TUBE REPLACEMENT. RIGHT EUSTACHIAN TUBE DILATION performed by Casandra Silva DO at [address] [place] [address] Left 03/15/2018    Nasal endoscopy with eustachian tube balloon dilation, left. [place] OFFICE/OUTPT VISIT,PROCEDURE ONLY Left 3/15/2018    LEFT EUSTACHIAN TUBE BALLOON DILATION AND LEFT MYRINGOTOMY WITH T-TUBE INSERTION performed by Casandra Silva DO at [address] [place] [address]  2003    x2; hydrocelectomy    TONSILLECTOMY      TOTAL KNEE ARTHROPLASTY Right 2013    Right TKA  T.  Elif Khan MD       Current Outpatient Medications: carbamide peroxide (DEBROX) 6.5 % otic solution, Place 5 drops into the left ear in the morning and 5 drops before bedtime. , Disp: 1 each, Rfl: 1    verapamil (VERELAN) 240 MG extended release capsule, Take 1 capsule by mouth nightly, Disp: 90 capsule, Rfl: 1    cloNIDine (CATAPRES) 0.1 MG tablet, Take 2 tablets by mouth every evening, Disp: 180 tablet, Rfl: 1    lisinopril-hydroCHLOROthiazide (PRINZIDE;ZESTORETIC) 20-12.5 MG per tablet, Take 1 tablet by mouth in the morning., Disp: 90 tablet, Rfl: 1    levothyroxine (SYNTHROID) 25 MCG tablet, Take 1 tablet by mouth in the morning., Disp: 90 tablet, Rfl: 1    fluticasone (FLONASE) 50 MCG/ACT nasal spray, 2 sprays by Nasal route in the morning., Disp: 48 g, Rfl: 3    tamsulosin (FLOMAX) 0.4 MG capsule, Take 2 capsules by mouth in the morning., Disp: 180 capsule, Rfl: 1  Other  Social History     Tobacco Use    Smoking status: Never    Smokeless tobacco: Never   Vaping Use    Vaping Use: Never used   Substance Use Topics    Alcohol use: No     Alcohol/week: 0.0 standard drinks    Drug use: No     Family History   Problem Relation Age of Onset    Stroke Mother     Osteoarthritis Mother     Coronary Art Dis Father     Pacemaker Father     Hypertension Father     No Known Problems Sister        Review of Systems   Constitutional:  Negative for chills and fever. HENT:  Negative for congestion, ear discharge, hearing loss, postnasal drip, rhinorrhea, trouble swallowing and voice change. Respiratory:  Negative for cough and shortness of breath. Cardiovascular:  Negative for chest pain and palpitations. Gastrointestinal:  Negative for vomiting. Skin:  Negative for rash. Allergic/Immunologic: Negative for environmental allergies. Neurological:  Negative for dizziness and headaches. Hematological:  Does not bruise/bleed easily. All other systems reviewed and are negative.     /73 (Site: Left Upper Arm, Position: Sitting, Cuff Size: Large Adult) Pulse 61   Ht 6' (1.829 m)   Wt 240 lb (108.9 kg)   BMI 32.55 kg/m²   Physical Exam  Vitals and nursing note reviewed. Constitutional:       Appearance: He is well-developed. HENT:      Head: Normocephalic and atraumatic. No contusion, masses or laceration. Jaw: No tenderness or swelling. Right Ear: Tympanic membrane, ear canal and external ear normal. No drainage. A PE tube is present. Left Ear: Tympanic membrane, ear canal and external ear normal. No drainage. A PE tube is present. Ears:        Nose: No congestion or rhinorrhea. Right Nostril: No epistaxis. Left Nostril: No epistaxis. Right Turbinates: Not enlarged or swollen. Left Turbinates: Not enlarged or swollen. Mouth/Throat:      Mouth: Mucous membranes are moist. No oral lesions. Dentition: No gum lesions. Pharynx: No oropharyngeal exudate or posterior oropharyngeal erythema. Eyes:      Pupils: Pupils are equal, round, and reactive to light. Neck:      Thyroid: No thyromegaly. Trachea: No tracheal deviation. Cardiovascular:      Rate and Rhythm: Normal rate. Pulmonary:      Effort: Pulmonary effort is normal. No respiratory distress. Musculoskeletal:         General: Normal range of motion. Cervical back: Normal range of motion. Lymphadenopathy:      Cervical: No cervical adenopathy. Skin:     General: Skin is warm. Findings: No erythema. Neurological:      Mental Status: He is alert. Cranial Nerves: No cranial nerve deficit.              IMPRESSION/PLAN:  L tube blocked with dried blood  Recommend debrox    If not resolving may need tube replacement

## 2022-08-07 ASSESSMENT — ENCOUNTER SYMPTOMS
COUGH: 0
VOMITING: 0
RHINORRHEA: 0
TROUBLE SWALLOWING: 0
SHORTNESS OF BREATH: 0
VOICE CHANGE: 0

## 2022-08-07 NOTE — PROGRESS NOTES
[address] [facility] Otolaryngology  Dr. Amy Bradley. Elida Friedman. Ms.Ed        Patient Name:  Nataly Ulloa  :  1950     CHIEF C/O:    Chief Complaint   Patient presents with    Follow-up     Pt states hearing is getting worse. HISTORY OBTAINED FROM:  patient    HISTORY OF PRESENT ILLNESS:       Berna Churchill is a 71y.o. year old male, here today for follow up of left ear been clogged for 1 week without improvement on ciprodex. :     Pt returns for left ear complaints. Still with significantly diminished hearing in left ear. No bleeding or drainage. Denies pain. Past Medical History:   Diagnosis Date    BPH (benign prostatic hyperplasia)     Colon polyp 2018    Dr. Nitesh WEINER (degenerative joint disease) of knee     Hypertension     Spinal stenosis, lumbar      Past Surgical History:   Procedure Laterality Date    CERVICAL LAMINECTOMY      LUMBAR LAMINECTOMY      MYRINGOTOMY Right 2016    Nasal endoscopy with biopsy,biopsy post nasal pharynx    MYRINGOTOMY Right 2019    with right eustachian tube balloon dilation    MYRINGOTOMY AND TYMPANOSTOMY TUBE PLACEMENT Left 2016    Left myringotomy with T-tube insertion    MYRINGOTOMY AND TYMPANOSTOMY TUBE PLACEMENT Left 03/15/2018    Left myringotomy with tympanostomy tube placement     MYRINGOTOMY AND TYMPANOSTOMY TUBE PLACEMENT Right 2019    RIGHT MYRINGOTOMY TUBE REPLACEMENT. RIGHT EUSTACHIAN TUBE DILATION performed by Braydon Olsen DO at [address] [place] [address] Left 03/15/2018    Nasal endoscopy with eustachian tube balloon dilation, left. [place] OFFICE/OUTPT VISIT,PROCEDURE ONLY Left 3/15/2018    LEFT EUSTACHIAN TUBE BALLOON DILATION AND LEFT MYRINGOTOMY WITH T-TUBE INSERTION performed by Braydon Olsen DO at [address] [place] [address]  2003    x2; hydrocelectomy    TONSILLECTOMY      TOTAL KNEE ARTHROPLASTY Right 2013    Right TKA  T.  Amelie De Paz MD       Current Outpatient Medications: ciprofloxacin-dexamethasone (CIPRODEX) 0.3-0.1 % otic suspension, Place 4 drops into the left ear in the morning and 4 drops before bedtime. Do all this for 10 days. , Disp: 7.5 mL, Rfl: 0    carbamide peroxide (DEBROX) 6.5 % otic solution, Place 5 drops into the left ear in the morning and 5 drops before bedtime. , Disp: 1 each, Rfl: 1    verapamil (VERELAN) 240 MG extended release capsule, Take 1 capsule by mouth nightly, Disp: 90 capsule, Rfl: 1    cloNIDine (CATAPRES) 0.1 MG tablet, Take 2 tablets by mouth every evening, Disp: 180 tablet, Rfl: 1    lisinopril-hydroCHLOROthiazide (PRINZIDE;ZESTORETIC) 20-12.5 MG per tablet, Take 1 tablet by mouth in the morning., Disp: 90 tablet, Rfl: 1    levothyroxine (SYNTHROID) 25 MCG tablet, Take 1 tablet by mouth in the morning., Disp: 90 tablet, Rfl: 1    fluticasone (FLONASE) 50 MCG/ACT nasal spray, 2 sprays by Nasal route in the morning., Disp: 48 g, Rfl: 3    tamsulosin (FLOMAX) 0.4 MG capsule, Take 2 capsules by mouth in the morning., Disp: 180 capsule, Rfl: 1  Other  Social History     Tobacco Use    Smoking status: Never    Smokeless tobacco: Never   Vaping Use    Vaping Use: Never used   Substance Use Topics    Alcohol use: No     Alcohol/week: 0.0 standard drinks    Drug use: No     Family History   Problem Relation Age of Onset    Stroke Mother     Osteoarthritis Mother     Coronary Art Dis Father     Pacemaker Father     Hypertension Father     No Known Problems Sister        Review of Systems   Constitutional:  Negative for chills and fever. HENT:  Negative for congestion, ear discharge, hearing loss, postnasal drip, rhinorrhea, trouble swallowing and voice change. Respiratory:  Negative for cough and shortness of breath. Cardiovascular:  Negative for chest pain and palpitations. Gastrointestinal:  Negative for vomiting. Skin:  Negative for rash. Allergic/Immunologic: Negative for environmental allergies.    Neurological:  Negative for dizziness and headaches. Hematological:  Does not bruise/bleed easily. All other systems reviewed and are negative. /73 (Site: Left Upper Arm, Position: Sitting, Cuff Size: Large Adult)   Pulse 61   Ht 6' (1.829 m)   Wt 240 lb (108.9 kg)   BMI 32.55 kg/m²   Physical Exam  Vitals and nursing note reviewed. Constitutional:       Appearance: He is well-developed. HENT:      Head: Normocephalic and atraumatic. No contusion, masses or laceration. Jaw: No tenderness or swelling. Right Ear: Tympanic membrane, ear canal and external ear normal. No drainage. A PE tube is present. Left Ear: Tympanic membrane, ear canal and external ear normal. No drainage. A PE tube is present. Ears:        Comments: Left t-tube      Nose: No congestion or rhinorrhea. Right Nostril: No epistaxis. Left Nostril: No epistaxis. Right Turbinates: Not enlarged or swollen. Left Turbinates: Not enlarged or swollen. Mouth/Throat:      Mouth: Mucous membranes are moist. No oral lesions. Dentition: No gum lesions. Pharynx: No oropharyngeal exudate or posterior oropharyngeal erythema. Eyes:      Pupils: Pupils are equal, round, and reactive to light. Neck:      Thyroid: No thyromegaly. Trachea: No tracheal deviation. Cardiovascular:      Rate and Rhythm: Normal rate. Pulmonary:      Effort: Pulmonary effort is normal. No respiratory distress. Musculoskeletal:         General: Normal range of motion. Cervical back: Normal range of motion. Lymphadenopathy:      Cervical: No cervical adenopathy. Skin:     General: Skin is warm. Findings: No erythema. Neurological:      Mental Status: He is alert. Cranial Nerves: No cranial nerve deficit. IMPRESSION/PLAN:  T tube and dried crusting removed from canal today. Recommend ciprodex x14 days. Follow up in 2 weeks for audio and follow up.     Electronically signed by Gerhard Poole,  on 8/7/2022 at 1:13 SHARON Zamudio [id number]  1950      I have discussed the case, including pertinent history and exam findings with the resident. I have seen and examined the patient and the key elements of the encounter have been performed by me. I agree with the assessment, plan and orders as documented by the resident. Patient here for follow up of medical problems. Remainder of medical problems as per resident note.       [facility],   9/1/22

## 2022-08-22 ENCOUNTER — OFFICE VISIT (OUTPATIENT)
Dept: ENT CLINIC | Age: 72
End: 2022-08-22
Payer: MEDICARE

## 2022-08-22 VITALS
SYSTOLIC BLOOD PRESSURE: 160 MMHG | HEART RATE: 56 BPM | HEIGHT: 72 IN | DIASTOLIC BLOOD PRESSURE: 79 MMHG | WEIGHT: 241 LBS | BODY MASS INDEX: 32.64 KG/M2

## 2022-08-22 DIAGNOSIS — H69.83 ETD (EUSTACHIAN TUBE DYSFUNCTION), BILATERAL: Primary | ICD-10-CM

## 2022-08-22 DIAGNOSIS — H72.92 PERFORATED EARDRUM, LEFT: ICD-10-CM

## 2022-08-22 PROCEDURE — 99213 OFFICE O/P EST LOW 20 MIN: CPT | Performed by: OTOLARYNGOLOGY

## 2022-08-22 PROCEDURE — G8427 DOCREV CUR MEDS BY ELIG CLIN: HCPCS | Performed by: OTOLARYNGOLOGY

## 2022-08-22 PROCEDURE — G8417 CALC BMI ABV UP PARAM F/U: HCPCS | Performed by: OTOLARYNGOLOGY

## 2022-08-22 PROCEDURE — 3017F COLORECTAL CA SCREEN DOC REV: CPT | Performed by: OTOLARYNGOLOGY

## 2022-08-22 PROCEDURE — 1123F ACP DISCUSS/DSCN MKR DOCD: CPT | Performed by: OTOLARYNGOLOGY

## 2022-08-22 PROCEDURE — 1036F TOBACCO NON-USER: CPT | Performed by: OTOLARYNGOLOGY

## 2022-08-22 ASSESSMENT — ENCOUNTER SYMPTOMS
SHORTNESS OF BREATH: 0
VOICE CHANGE: 0
VOMITING: 0
COUGH: 0
RHINORRHEA: 0
TROUBLE SWALLOWING: 0

## 2022-08-22 NOTE — PROGRESS NOTES
[facility] Otolaryngology  Dr. Dalton Ge. Jb North. Ms.Ed        Patient Name:  Thai David  :  1950     CHIEF C/O:    Chief Complaint   Patient presents with    Follow-up     2wk f/u diminished hearing       HISTORY OBTAINED FROM:  patient    HISTORY OF PRESENT ILLNESS:       Karla Thomas is a 71y.o. year old male, here today for follow up of left ear been clogged for 1 week without improvement on ciprodex. : Pt returns for left ear complaints. Still with significantly diminished hearing in left ear. No bleeding or drainage. Denies pain. 22: Pt here for follow up after left T tube was removed and ciprodex given for 10 days. Pt reports ears feels a lot better. Hearing improved on that side. Past Medical History:   Diagnosis Date    BPH (benign prostatic hyperplasia)     Colon polyp 2018    Dr. Tha Chakraborty    DJD (degenerative joint disease) of knee     Hypertension     Spinal stenosis, lumbar      Past Surgical History:   Procedure Laterality Date    CERVICAL LAMINECTOMY      LUMBAR LAMINECTOMY  2010    MYRINGOTOMY Right 2016    Nasal endoscopy with biopsy,biopsy post nasal pharynx    MYRINGOTOMY Right 2019    with right eustachian tube balloon dilation    MYRINGOTOMY AND TYMPANOSTOMY TUBE PLACEMENT Left 2016    Left myringotomy with T-tube insertion    MYRINGOTOMY AND TYMPANOSTOMY TUBE PLACEMENT Left 03/15/2018    Left myringotomy with tympanostomy tube placement     MYRINGOTOMY AND TYMPANOSTOMY TUBE PLACEMENT Right 2019    RIGHT MYRINGOTOMY TUBE REPLACEMENT. RIGHT EUSTACHIAN TUBE DILATION performed by Jean Claude Stearns DO at [address] [place] [address] Left 03/15/2018    Nasal endoscopy with eustachian tube balloon dilation, left.     [place] OFFICE/OUTPT VISIT,PROCEDURE ONLY Left 3/15/2018    LEFT EUSTACHIAN TUBE BALLOON DILATION AND LEFT MYRINGOTOMY WITH T-TUBE INSERTION performed by Jean Claude Stearns DO at [address] [place] [address]  2003    x2; hydrocelectomy    TONSILLECTOMY      TOTAL KNEE ARTHROPLASTY Right 01/07/2013    Right TKA  T. Veronique Bañuelos MD       Current Outpatient Medications:     carbamide peroxide (DEBROX) 6.5 % otic solution, Place 5 drops into the left ear in the morning and 5 drops before bedtime. , Disp: 1 each, Rfl: 1    verapamil (VERELAN) 240 MG extended release capsule, Take 1 capsule by mouth nightly, Disp: 90 capsule, Rfl: 1    cloNIDine (CATAPRES) 0.1 MG tablet, Take 2 tablets by mouth every evening, Disp: 180 tablet, Rfl: 1    lisinopril-hydroCHLOROthiazide (PRINZIDE;ZESTORETIC) 20-12.5 MG per tablet, Take 1 tablet by mouth in the morning., Disp: 90 tablet, Rfl: 1    levothyroxine (SYNTHROID) 25 MCG tablet, Take 1 tablet by mouth in the morning., Disp: 90 tablet, Rfl: 1    fluticasone (FLONASE) 50 MCG/ACT nasal spray, 2 sprays by Nasal route in the morning., Disp: 48 g, Rfl: 3    tamsulosin (FLOMAX) 0.4 MG capsule, Take 2 capsules by mouth in the morning., Disp: 180 capsule, Rfl: 1  Other  Social History     Tobacco Use    Smoking status: Never    Smokeless tobacco: Never   Vaping Use    Vaping Use: Never used   Substance Use Topics    Alcohol use: No     Alcohol/week: 0.0 standard drinks    Drug use: No     Family History   Problem Relation Age of Onset    Stroke Mother     Osteoarthritis Mother     Coronary Art Dis Father     Pacemaker Father     Hypertension Father     No Known Problems Sister        Review of Systems   Constitutional:  Negative for chills and fever. HENT:  Negative for congestion, ear discharge, hearing loss, postnasal drip, rhinorrhea, trouble swallowing and voice change. Respiratory:  Negative for cough and shortness of breath. Cardiovascular:  Negative for chest pain and palpitations. Gastrointestinal:  Negative for vomiting. Skin:  Negative for rash. Allergic/Immunologic: Negative for environmental allergies. Neurological:  Negative for dizziness and headaches.    Hematological:  Does not bruise/bleed easily. All other systems reviewed and are negative. BP (!) 160/79 (Site: Left Upper Arm, Position: Sitting, Cuff Size: Medium Adult)   Pulse 56   Ht 6' (1.829 m)   Wt 241 lb (109.3 kg)   BMI 32.69 kg/m²   Physical Exam  Vitals and nursing note reviewed. Constitutional:       Appearance: He is well-developed. HENT:      Head: Normocephalic and atraumatic. No contusion, masses or laceration. Jaw: No tenderness or swelling. Right Ear: Tympanic membrane, ear canal and external ear normal. No drainage. A PE tube is present. Left Ear: Tympanic membrane, ear canal and external ear normal. No drainage. Ears:        Comments: LeftTM with residual anterior perforation, minimal crusting around TM     Nose: No congestion or rhinorrhea. Right Nostril: No epistaxis. Left Nostril: No epistaxis. Right Turbinates: Not enlarged or swollen. Left Turbinates: Not enlarged or swollen. Mouth/Throat:      Mouth: Mucous membranes are moist. No oral lesions. Dentition: No gum lesions. Pharynx: No oropharyngeal exudate or posterior oropharyngeal erythema. Eyes:      Pupils: Pupils are equal, round, and reactive to light. Neck:      Thyroid: No thyromegaly. Trachea: No tracheal deviation. Cardiovascular:      Rate and Rhythm: Normal rate. Pulmonary:      Effort: Pulmonary effort is normal. No respiratory distress. Musculoskeletal:         General: Normal range of motion. Cervical back: Normal range of motion. Lymphadenopathy:      Cervical: No cervical adenopathy. Skin:     General: Skin is warm. Findings: No erythema. Neurological:      Mental Status: He is alert. Cranial Nerves: No cranial nerve deficit. IMPRESSION/PLAN:  Pt seen and examined, ear exam much improved and pt clinically improving. Use drops if drainage occurs. Follow up in 3-4 months.      Electronically signed by Edna Bar DO on 8/22/2022 at 1:14 SHARON WISE  1950      I have discussed the case, including pertinent history and exam findings with the resident. I have seen and examined the patient and the key elements of the encounter have been performed by me. I agree with the assessment, plan and orders as documented by the resident. Patient here for follow up of medical problems. Remainder of medical problems as per resident note.       [facility],   9/20/22

## 2022-09-14 ENCOUNTER — OFFICE VISIT (OUTPATIENT)
Dept: FAMILY MEDICINE CLINIC | Age: 72
End: 2022-09-14
Payer: MEDICARE

## 2022-09-14 VITALS
WEIGHT: 240 LBS | TEMPERATURE: 97.6 F | RESPIRATION RATE: 20 BRPM | BODY MASS INDEX: 32.51 KG/M2 | HEART RATE: 72 BPM | DIASTOLIC BLOOD PRESSURE: 74 MMHG | OXYGEN SATURATION: 98 % | SYSTOLIC BLOOD PRESSURE: 160 MMHG | HEIGHT: 72 IN

## 2022-09-14 DIAGNOSIS — R11.0 NAUSEA: ICD-10-CM

## 2022-09-14 DIAGNOSIS — R10.10 UPPER ABDOMINAL PAIN: Primary | ICD-10-CM

## 2022-09-14 DIAGNOSIS — R10.10 UPPER ABDOMINAL PAIN: ICD-10-CM

## 2022-09-14 LAB
ALBUMIN SERPL-MCNC: 4.4 G/DL (ref 3.5–5.2)
ALP BLD-CCNC: 49 U/L (ref 40–129)
ALT SERPL-CCNC: 8 U/L (ref 0–40)
ANION GAP SERPL CALCULATED.3IONS-SCNC: 11 MMOL/L (ref 7–16)
AST SERPL-CCNC: 15 U/L (ref 0–39)
BASOPHILS ABSOLUTE: 0.05 E9/L (ref 0–0.2)
BASOPHILS RELATIVE PERCENT: 0.5 % (ref 0–2)
BILIRUB SERPL-MCNC: 0.5 MG/DL (ref 0–1.2)
BUN BLDV-MCNC: 10 MG/DL (ref 6–23)
CALCIUM SERPL-MCNC: 9.5 MG/DL (ref 8.6–10.2)
CHLORIDE BLD-SCNC: 101 MMOL/L (ref 98–107)
CO2: 26 MMOL/L (ref 22–29)
CREAT SERPL-MCNC: 1 MG/DL (ref 0.7–1.2)
EOSINOPHILS ABSOLUTE: 0.14 E9/L (ref 0.05–0.5)
EOSINOPHILS RELATIVE PERCENT: 1.5 % (ref 0–6)
GFR AFRICAN AMERICAN: >60
GFR NON-AFRICAN AMERICAN: >60 ML/MIN/1.73
GLUCOSE BLD-MCNC: 89 MG/DL (ref 74–99)
HCT VFR BLD CALC: 43.4 % (ref 37–54)
HEMOGLOBIN: 14.6 G/DL (ref 12.5–16.5)
IMMATURE GRANULOCYTES #: 0.06 E9/L
IMMATURE GRANULOCYTES %: 0.6 % (ref 0–5)
LIPASE: 31 U/L (ref 13–60)
LYMPHOCYTES ABSOLUTE: 1.57 E9/L (ref 1.5–4)
LYMPHOCYTES RELATIVE PERCENT: 16.9 % (ref 20–42)
MCH RBC QN AUTO: 30.9 PG (ref 26–35)
MCHC RBC AUTO-ENTMCNC: 33.6 % (ref 32–34.5)
MCV RBC AUTO: 91.8 FL (ref 80–99.9)
MONOCYTES ABSOLUTE: 0.51 E9/L (ref 0.1–0.95)
MONOCYTES RELATIVE PERCENT: 5.5 % (ref 2–12)
NEUTROPHILS ABSOLUTE: 6.96 E9/L (ref 1.8–7.3)
NEUTROPHILS RELATIVE PERCENT: 75 % (ref 43–80)
PDW BLD-RTO: 13.3 FL (ref 11.5–15)
PLATELET # BLD: 236 E9/L (ref 130–450)
PMV BLD AUTO: 10.4 FL (ref 7–12)
POTASSIUM SERPL-SCNC: 4 MMOL/L (ref 3.5–5)
RBC # BLD: 4.73 E12/L (ref 3.8–5.8)
SODIUM BLD-SCNC: 138 MMOL/L (ref 132–146)
TOTAL PROTEIN: 7.1 G/DL (ref 6.4–8.3)
WBC # BLD: 9.3 E9/L (ref 4.5–11.5)

## 2022-09-14 PROCEDURE — 99214 OFFICE O/P EST MOD 30 MIN: CPT | Performed by: PHYSICIAN ASSISTANT

## 2022-09-14 PROCEDURE — 1036F TOBACCO NON-USER: CPT | Performed by: PHYSICIAN ASSISTANT

## 2022-09-14 PROCEDURE — G8417 CALC BMI ABV UP PARAM F/U: HCPCS | Performed by: PHYSICIAN ASSISTANT

## 2022-09-14 PROCEDURE — 1123F ACP DISCUSS/DSCN MKR DOCD: CPT | Performed by: PHYSICIAN ASSISTANT

## 2022-09-14 PROCEDURE — 3017F COLORECTAL CA SCREEN DOC REV: CPT | Performed by: PHYSICIAN ASSISTANT

## 2022-09-14 PROCEDURE — G8427 DOCREV CUR MEDS BY ELIG CLIN: HCPCS | Performed by: PHYSICIAN ASSISTANT

## 2022-09-14 RX ORDER — SUCRALFATE ORAL 1 G/10ML
1 SUSPENSION ORAL 4 TIMES DAILY
Qty: 400 ML | Refills: 0 | Status: SHIPPED
Start: 2022-09-14 | End: 2022-09-15

## 2022-09-14 RX ORDER — ONDANSETRON 4 MG/1
4 TABLET, ORALLY DISINTEGRATING ORAL 3 TIMES DAILY PRN
Qty: 21 TABLET | Refills: 0 | Status: SHIPPED | OUTPATIENT
Start: 2022-09-14

## 2022-09-14 NOTE — PROGRESS NOTES
LAMINECTOMY  1989    LUMBAR LAMINECTOMY  2010    MYRINGOTOMY Right 01/14/2016    Nasal endoscopy with biopsy,biopsy post nasal pharynx    MYRINGOTOMY Right 01/24/2019    with right eustachian tube balloon dilation    MYRINGOTOMY AND TYMPANOSTOMY TUBE PLACEMENT Left 12/08/2016    Left myringotomy with T-tube insertion    MYRINGOTOMY AND TYMPANOSTOMY TUBE PLACEMENT Left 03/15/2018    Left myringotomy with tympanostomy tube placement     MYRINGOTOMY AND TYMPANOSTOMY TUBE PLACEMENT Right 1/24/2019    RIGHT MYRINGOTOMY TUBE REPLACEMENT. RIGHT EUSTACHIAN TUBE DILATION performed by Waylon York DO at [address] [facility] Left 03/15/2018    Nasal endoscopy with eustachian tube balloon dilation, left. [place] OFFICE/OUTPT VISIT,PROCEDURE ONLY Left 3/15/2018    LEFT EUSTACHIAN TUBE BALLOON DILATION AND LEFT MYRINGOTOMY WITH T-TUBE INSERTION performed by Waylon York DO at [address]  2003    x2; hydrocelectomy    TONSILLECTOMY      TOTAL KNEE ARTHROPLASTY Right 01/07/2013    Right TKA  T.  Ngoc Pedersen MD       Family History   Problem Relation Age of Onset    Stroke Mother     Osteoarthritis Mother     Coronary Art Dis Father     Pacemaker Father     Hypertension Father     No Known Problems Sister        Medications:     Current Outpatient Medications:     verapamil (VERELAN) 240 MG extended release capsule, Take 1 capsule by mouth nightly, Disp: 90 capsule, Rfl: 1    cloNIDine (CATAPRES) 0.1 MG tablet, Take 2 tablets by mouth every evening, Disp: 180 tablet, Rfl: 1    lisinopril-hydroCHLOROthiazide (PRINZIDE;ZESTORETIC) 20-12.5 MG per tablet, Take 1 tablet by mouth in the morning., Disp: 90 tablet, Rfl: 1    levothyroxine (SYNTHROID) 25 MCG tablet, Take 1 tablet by mouth in the morning., Disp: 90 tablet, Rfl: 1    fluticasone (FLONASE) 50 MCG/ACT nasal spray, 2 sprays by Nasal route in the morning., Disp: 48 g, Rfl: 3    tamsulosin (FLOMAX) 0.4 MG capsule, Take 2 capsules by mouth in the morning., Disp: 180 capsule, Rfl: 1    Allergies: Allergies   Allergen Reactions    Other      SEASONAL       Social History:     Social History     Tobacco Use    Smoking status: Never    Smokeless tobacco: Never   Vaping Use    Vaping Use: Never used   Substance Use Topics    Alcohol use: No     Alcohol/week: 0.0 standard drinks    Drug use: No       Patient lives at home. Physical Exam:     Vitals:    09/14/22 1328   BP: (!) 160/74   Site: Right Upper Arm   Position: Sitting   Cuff Size: Medium Adult   Pulse: 72   Resp: 20   Temp: 97.6 °F (36.4 °C)   TempSrc: Temporal   SpO2: 98%   Weight: 240 lb (108.9 kg)   Height: 6' (1.829 m)       Exam:  Physical Exam  Nurses note and vital signs reviewed and patient is not hypoxic. General: The patient appears well and in no apparent distress. Patient is resting comfortably on cart. Skin: Warm, dry, no pallor noted. There is no rash noted. Head: Normocephalic, atraumatic. Eye: Normal conjunctiva  Ears, Nose, Mouth, and Throat: Wearing a mask  Cardiovascular: Regular Rate and Rhythm  Respiratory: Patient is in no distress, no accessory muscle use, lungs are clear to auscultation, no wheezing, crackles or rhonchi  Back: Non-tender, no CVA tenderness bilaterally to percussion. GI: Normal bowel sounds, soft, nontender, there is evidence of a ventral hernia, also umbilical hernia that is reducible, there is no reproducible tenderness. No rebound or guarding  Musculoskeletal: The patient has no evidence of calf tenderness, no pitting edema, symmetrical pulses noted bilaterally  Neurological: A&O x4, normal speech      Testing:           Medical Decision Making:     Vital signs reviewed    Past medical history reviewed. Allergies reviewed. Medications reviewed. Patient on arrival does not appear to be in any apparent distress or discomfort. The patient has been seen and evaluated. The patient does not appear to be toxic or lethargic.      The

## 2022-09-15 ENCOUNTER — TELEPHONE (OUTPATIENT)
Dept: PRIMARY CARE CLINIC | Age: 72
End: 2022-09-15

## 2022-09-15 RX ORDER — SUCRALFATE 1 G/1
1 TABLET ORAL 4 TIMES DAILY
Qty: 40 TABLET | Refills: 0 | Status: SHIPPED | OUTPATIENT
Start: 2022-09-15 | End: 2022-09-25

## 2022-09-15 NOTE — TELEPHONE ENCOUNTER
Patient calling states insurance will not cover carafate asking if you could send something else due to the cost?

## 2022-09-15 NOTE — RESULT ENCOUNTER NOTE
Laboratory studies were essentially unremarkable and normal.  No abnormality of the hepatic panel. Normal kidney function. Lipase was normal.  Normal white blood cell count.

## 2022-09-15 NOTE — TELEPHONE ENCOUNTER
I sent over the pill form.   The patient can dissolve the pill and 30 mL of water and then take 4 times daily

## 2022-09-20 DIAGNOSIS — R35.1 NOCTURIA: ICD-10-CM

## 2022-09-20 RX ORDER — TAMSULOSIN HYDROCHLORIDE 0.4 MG/1
CAPSULE ORAL
Qty: 180 CAPSULE | Refills: 1 | Status: SHIPPED | OUTPATIENT
Start: 2022-09-20

## 2022-09-27 LAB — PROSTATE SPECIFIC ANTIGEN: 5.33 NG/ML (ref 0–4)

## 2022-10-09 DIAGNOSIS — R09.82 POSTNASAL DRIP: ICD-10-CM

## 2022-10-10 RX ORDER — MONTELUKAST SODIUM 10 MG/1
TABLET ORAL
Qty: 90 TABLET | Refills: 1 | OUTPATIENT
Start: 2022-10-10

## 2022-11-06 DIAGNOSIS — I10 ESSENTIAL HYPERTENSION: ICD-10-CM

## 2022-11-07 RX ORDER — CLONIDINE HYDROCHLORIDE 0.1 MG/1
0.2 TABLET ORAL EVERY EVENING
Qty: 180 TABLET | Refills: 1 | Status: SHIPPED | OUTPATIENT
Start: 2022-11-07

## 2022-11-07 RX ORDER — LISINOPRIL AND HYDROCHLOROTHIAZIDE 20; 12.5 MG/1; MG/1
1 TABLET ORAL DAILY
Qty: 90 TABLET | Refills: 1 | Status: SHIPPED | OUTPATIENT
Start: 2022-11-07

## 2022-11-16 ENCOUNTER — PROCEDURE VISIT (OUTPATIENT)
Dept: AUDIOLOGY | Age: 72
End: 2022-11-16
Payer: MEDICARE

## 2022-11-16 ENCOUNTER — OFFICE VISIT (OUTPATIENT)
Dept: ENT CLINIC | Age: 72
End: 2022-11-16
Payer: MEDICARE

## 2022-11-16 VITALS
DIASTOLIC BLOOD PRESSURE: 83 MMHG | HEART RATE: 64 BPM | HEIGHT: 72 IN | WEIGHT: 247.2 LBS | SYSTOLIC BLOOD PRESSURE: 155 MMHG | BODY MASS INDEX: 33.48 KG/M2

## 2022-11-16 DIAGNOSIS — H65.31 CHRONIC SECRETORY OTITIS MEDIA OF RIGHT EAR: Primary | ICD-10-CM

## 2022-11-16 DIAGNOSIS — H69.83 ETD (EUSTACHIAN TUBE DYSFUNCTION), BILATERAL: Primary | ICD-10-CM

## 2022-11-16 DIAGNOSIS — H65.491 COME (CHRONIC OTITIS MEDIA WITH EFFUSION), RIGHT: ICD-10-CM

## 2022-11-16 PROCEDURE — 3017F COLORECTAL CA SCREEN DOC REV: CPT | Performed by: OTOLARYNGOLOGY

## 2022-11-16 PROCEDURE — 1123F ACP DISCUSS/DSCN MKR DOCD: CPT | Performed by: OTOLARYNGOLOGY

## 2022-11-16 PROCEDURE — G8427 DOCREV CUR MEDS BY ELIG CLIN: HCPCS | Performed by: OTOLARYNGOLOGY

## 2022-11-16 PROCEDURE — G8417 CALC BMI ABV UP PARAM F/U: HCPCS | Performed by: OTOLARYNGOLOGY

## 2022-11-16 PROCEDURE — 92567 TYMPANOMETRY: CPT | Performed by: AUDIOLOGIST

## 2022-11-16 PROCEDURE — 1036F TOBACCO NON-USER: CPT | Performed by: OTOLARYNGOLOGY

## 2022-11-16 PROCEDURE — G8484 FLU IMMUNIZE NO ADMIN: HCPCS | Performed by: OTOLARYNGOLOGY

## 2022-11-16 PROCEDURE — 3074F SYST BP LT 130 MM HG: CPT | Performed by: OTOLARYNGOLOGY

## 2022-11-16 PROCEDURE — 3078F DIAST BP <80 MM HG: CPT | Performed by: OTOLARYNGOLOGY

## 2022-11-16 PROCEDURE — 99213 OFFICE O/P EST LOW 20 MIN: CPT | Performed by: OTOLARYNGOLOGY

## 2022-11-16 ASSESSMENT — ENCOUNTER SYMPTOMS
TROUBLE SWALLOWING: 0
RHINORRHEA: 0
COUGH: 0
VOMITING: 0
VOICE CHANGE: 0
SHORTNESS OF BREATH: 0

## 2022-11-16 NOTE — PROGRESS NOTES
This patient was referred for tympanometric testing by Dr. Dangelo Scott. Patient is being seen for PE tube check, with no acute issues. .       Tympanometry was attempted several times, but seals could not be obtained, bilaterally. The results were reviewed with the patient. Recommendations for follow up will be made pending physician consult.     Tanja Brock CCC/UARORA  Audiologist  [id number]  NPI#:  [id number]      Electronically signed by Hang Turner on 11/16/2022 at 8:12 AM

## 2022-11-16 NOTE — PROGRESS NOTES
[facility] Otolaryngology  Dr. Kandace Mills. Ana María Atwood. Ms.Ed        Patient Name:  Darya Joseph  :  1950     CHIEF C/O:    Chief Complaint   Patient presents with    Follow-up     Tube check       HISTORY OBTAINED FROM:  patient    HISTORY OF PRESENT ILLNESS:       Polo Zamora is a 71y.o. year old male, here today for follow up of left ear been clogged for 1 week without improvement on ciprodex. : Pt returns for left ear complaints. Still with significantly diminished hearing in left ear. No bleeding or drainage. Denies pain. 22: Pt here for follow up after left T tube was removed and ciprodex given for 10 days. Pt reports ears feels a lot better. Hearing improved on that side. 22:  Patient is here for follow up. Denies any drainage, ear pain or aural fullness from either ear. Slightly decreased hearing in left ear compared to right, but is not bothersome to the patient. Past Medical History:   Diagnosis Date    BPH (benign prostatic hyperplasia)     Colon polyp 2018    Dr. Dorene VALLED (degenerative joint disease) of knee     Hypertension     Spinal stenosis, lumbar      Past Surgical History:   Procedure Laterality Date    CERVICAL LAMINECTOMY      LUMBAR LAMINECTOMY  2010    MYRINGOTOMY Right 2016    Nasal endoscopy with biopsy,biopsy post nasal pharynx    MYRINGOTOMY Right 2019    with right eustachian tube balloon dilation    MYRINGOTOMY AND TYMPANOSTOMY TUBE PLACEMENT Left 2016    Left myringotomy with T-tube insertion    MYRINGOTOMY AND TYMPANOSTOMY TUBE PLACEMENT Left 03/15/2018    Left myringotomy with tympanostomy tube placement     MYRINGOTOMY AND TYMPANOSTOMY TUBE PLACEMENT Right 2019    RIGHT MYRINGOTOMY TUBE REPLACEMENT. RIGHT EUSTACHIAN TUBE DILATION performed by Geovani Hutson DO at [address] [place] [address] Left 03/15/2018    Nasal endoscopy with eustachian tube balloon dilation, left.     [place] OFFICE/OUTPT VISIT,PROCEDURE ONLY Left 3/15/2018    LEFT EUSTACHIAN TUBE BALLOON DILATION AND LEFT MYRINGOTOMY WITH T-TUBE INSERTION performed by Karla Gray DO at [address] [facility] [address]  2003    x2; hydrocelectomy    TONSILLECTOMY      TOTAL KNEE ARTHROPLASTY Right 01/07/2013    Right TKA  JEWELL Padgett MD       Current Outpatient Medications:     lisinopril-hydroCHLOROthiazide (PRINZIDE;ZESTORETIC) 20-12.5 MG per tablet, TAKE 1 TABLET BY MOUTH IN THE MORNING, Disp: 90 tablet, Rfl: 1    cloNIDine (CATAPRES) 0.1 MG tablet, TAKE 2 TABLETS BY MOUTH EVERY EVENING, Disp: 180 tablet, Rfl: 1    tamsulosin (FLOMAX) 0.4 MG capsule, TAKE 2 CAPSULES BY MOUTH DAILY, Disp: 180 capsule, Rfl: 1    ondansetron (ZOFRAN-ODT) 4 MG disintegrating tablet, Take 1 tablet by mouth 3 times daily as needed for Nausea or Vomiting, Disp: 21 tablet, Rfl: 0    verapamil (VERELAN) 240 MG extended release capsule, Take 1 capsule by mouth nightly, Disp: 90 capsule, Rfl: 1    levothyroxine (SYNTHROID) 25 MCG tablet, Take 1 tablet by mouth in the morning., Disp: 90 tablet, Rfl: 1    fluticasone (FLONASE) 50 MCG/ACT nasal spray, 2 sprays by Nasal route in the morning., Disp: 48 g, Rfl: 3    sucralfate (CARAFATE) 1 GM tablet, Take 1 tablet by mouth 4 times daily for 10 days, Disp: 40 tablet, Rfl: 0  Other  Social History     Tobacco Use    Smoking status: Never    Smokeless tobacco: Never   Vaping Use    Vaping Use: Never used   Substance Use Topics    Alcohol use: No     Alcohol/week: 0.0 standard drinks    Drug use: No     Family History   Problem Relation Age of Onset    Stroke Mother     Osteoarthritis Mother     Coronary Art Dis Father     Pacemaker Father     Hypertension Father     No Known Problems Sister        Review of Systems   Constitutional:  Negative for chills and fever. HENT:  Negative for congestion, ear discharge, hearing loss, postnasal drip, rhinorrhea, trouble swallowing and voice change. Respiratory:  Negative for cough and shortness of breath. Cardiovascular:  Negative for chest pain and palpitations. Gastrointestinal:  Negative for vomiting. Skin:  Negative for rash. Allergic/Immunologic: Negative for environmental allergies. Neurological:  Negative for dizziness and headaches. Hematological:  Does not bruise/bleed easily. All other systems reviewed and are negative. BP (!) 155/83   Pulse 64   Ht 6' (1.829 m)   Wt 247 lb 3.2 oz (112.1 kg)   BMI 33.53 kg/m²   Physical Exam  Vitals and nursing note reviewed. Constitutional:       Appearance: He is well-developed. HENT:      Head: Normocephalic and atraumatic. No contusion, masses or laceration. Jaw: No tenderness or swelling. Right Ear: Tympanic membrane, ear canal and external ear normal. No drainage. A PE tube is present. Left Ear: Tympanic membrane, ear canal and external ear normal. No drainage. Ears:      Comments: LeftTM with residual anterior perforation, minimal crusting around TM with small amount of granulation tissue. No signs of infection. Right T tube in place and patent     Nose: No congestion or rhinorrhea. Right Nostril: No epistaxis. Left Nostril: No epistaxis. Right Turbinates: Not enlarged or swollen. Left Turbinates: Not enlarged or swollen. Mouth/Throat:      Mouth: Mucous membranes are moist. No oral lesions. Dentition: No gum lesions. Pharynx: No oropharyngeal exudate or posterior oropharyngeal erythema. Eyes:      Pupils: Pupils are equal, round, and reactive to light. Neck:      Thyroid: No thyromegaly. Trachea: No tracheal deviation. Cardiovascular:      Rate and Rhythm: Normal rate. Pulmonary:      Effort: Pulmonary effort is normal. No respiratory distress. Musculoskeletal:         General: Normal range of motion. Cervical back: Normal range of motion. Lymphadenopathy:      Cervical: No cervical adenopathy. Skin:     General: Skin is warm. Findings: No erythema. Neurological:      Mental Status: He is alert. Cranial Nerves: No cranial nerve deficit. IMPRESSION/PLAN:  Pt seen and examined, ear exam much improved and pt clinically improving. Use drops if drainage occurs.

## 2022-12-06 DIAGNOSIS — I10 ESSENTIAL HYPERTENSION: ICD-10-CM

## 2022-12-06 RX ORDER — VERAPAMIL HYDROCHLORIDE 240 MG/1
240 CAPSULE, EXTENDED RELEASE ORAL NIGHTLY
Qty: 90 CAPSULE | Refills: 1 | Status: SHIPPED | OUTPATIENT
Start: 2022-12-06 | End: 2023-09-02

## 2022-12-06 NOTE — TELEPHONE ENCOUNTER
The pt is calling because [facility] is telling him that they won't have any verapamil 240 mg ER until March 2023, he is asking if it can be changed to something else because he needs a refill he only has 2 days left

## 2023-01-24 ENCOUNTER — OFFICE VISIT (OUTPATIENT)
Dept: PRIMARY CARE CLINIC | Age: 73
End: 2023-01-24
Payer: MEDICARE

## 2023-01-24 VITALS
HEIGHT: 72 IN | BODY MASS INDEX: 33.18 KG/M2 | SYSTOLIC BLOOD PRESSURE: 122 MMHG | HEART RATE: 77 BPM | OXYGEN SATURATION: 97 % | TEMPERATURE: 97.2 F | WEIGHT: 245 LBS | DIASTOLIC BLOOD PRESSURE: 70 MMHG

## 2023-01-24 DIAGNOSIS — I10 ESSENTIAL HYPERTENSION: Primary | ICD-10-CM

## 2023-01-24 DIAGNOSIS — C61 PROSTATE CANCER (HCC): ICD-10-CM

## 2023-01-24 DIAGNOSIS — I10 ESSENTIAL HYPERTENSION: ICD-10-CM

## 2023-01-24 DIAGNOSIS — E03.9 ACQUIRED HYPOTHYROIDISM: ICD-10-CM

## 2023-01-24 DIAGNOSIS — E55.9 VITAMIN D INSUFFICIENCY: ICD-10-CM

## 2023-01-24 DIAGNOSIS — R73.01 IMPAIRED FASTING GLUCOSE: ICD-10-CM

## 2023-01-24 LAB
ALBUMIN SERPL-MCNC: 4.1 G/DL (ref 3.5–5.2)
ALP BLD-CCNC: 51 U/L (ref 40–129)
ALT SERPL-CCNC: 9 U/L (ref 0–40)
ANION GAP SERPL CALCULATED.3IONS-SCNC: 14 MMOL/L (ref 7–16)
AST SERPL-CCNC: 16 U/L (ref 0–39)
BASOPHILS ABSOLUTE: 0.04 E9/L (ref 0–0.2)
BASOPHILS RELATIVE PERCENT: 0.6 % (ref 0–2)
BILIRUB SERPL-MCNC: 0.5 MG/DL (ref 0–1.2)
BILIRUBIN URINE: NEGATIVE
BLOOD, URINE: NEGATIVE
BUN BLDV-MCNC: 17 MG/DL (ref 6–23)
CALCIUM SERPL-MCNC: 8.7 MG/DL (ref 8.6–10.2)
CHLORIDE BLD-SCNC: 104 MMOL/L (ref 98–107)
CHOLESTEROL, TOTAL: 120 MG/DL (ref 0–199)
CLARITY: CLEAR
CO2: 24 MMOL/L (ref 22–29)
COLOR: YELLOW
CREAT SERPL-MCNC: 1 MG/DL (ref 0.7–1.2)
EOSINOPHILS ABSOLUTE: 0.18 E9/L (ref 0.05–0.5)
EOSINOPHILS RELATIVE PERCENT: 2.7 % (ref 0–6)
GFR SERPL CREATININE-BSD FRML MDRD: >60 ML/MIN/1.73
GLUCOSE BLD-MCNC: 110 MG/DL (ref 74–99)
GLUCOSE URINE: NEGATIVE MG/DL
HBA1C MFR BLD: 5.8 % (ref 4–5.6)
HCT VFR BLD CALC: 40.7 % (ref 37–54)
HDLC SERPL-MCNC: 30 MG/DL
HEMOGLOBIN: 13.6 G/DL (ref 12.5–16.5)
IMMATURE GRANULOCYTES #: 0.04 E9/L
IMMATURE GRANULOCYTES %: 0.6 % (ref 0–5)
KETONES, URINE: NEGATIVE MG/DL
LDL CHOLESTEROL CALCULATED: 77 MG/DL (ref 0–99)
LEUKOCYTE ESTERASE, URINE: NEGATIVE
LYMPHOCYTES ABSOLUTE: 1.13 E9/L (ref 1.5–4)
LYMPHOCYTES RELATIVE PERCENT: 17.2 % (ref 20–42)
MCH RBC QN AUTO: 30.3 PG (ref 26–35)
MCHC RBC AUTO-ENTMCNC: 33.4 % (ref 32–34.5)
MCV RBC AUTO: 90.6 FL (ref 80–99.9)
MONOCYTES ABSOLUTE: 0.51 E9/L (ref 0.1–0.95)
MONOCYTES RELATIVE PERCENT: 7.8 % (ref 2–12)
NEUTROPHILS ABSOLUTE: 4.66 E9/L (ref 1.8–7.3)
NEUTROPHILS RELATIVE PERCENT: 71.1 % (ref 43–80)
NITRITE, URINE: NEGATIVE
PDW BLD-RTO: 13.6 FL (ref 11.5–15)
PH UA: 6 (ref 5–9)
PLATELET # BLD: 202 E9/L (ref 130–450)
PMV BLD AUTO: 10.2 FL (ref 7–12)
POTASSIUM SERPL-SCNC: 3.9 MMOL/L (ref 3.5–5)
PROTEIN UA: NEGATIVE MG/DL
RBC # BLD: 4.49 E12/L (ref 3.8–5.8)
SODIUM BLD-SCNC: 142 MMOL/L (ref 132–146)
SPECIFIC GRAVITY UA: 1.02 (ref 1–1.03)
TOTAL PROTEIN: 6.7 G/DL (ref 6.4–8.3)
TRIGL SERPL-MCNC: 67 MG/DL (ref 0–149)
TSH SERPL DL<=0.05 MIU/L-ACNC: 3.12 UIU/ML (ref 0.27–4.2)
URIC ACID, SERUM: 5.8 MG/DL (ref 3.4–7)
UROBILINOGEN, URINE: 1 E.U./DL
VITAMIN D 25-HYDROXY: 46 NG/ML (ref 30–100)
VLDLC SERPL CALC-MCNC: 13 MG/DL
WBC # BLD: 6.6 E9/L (ref 4.5–11.5)

## 2023-01-24 PROCEDURE — 99214 OFFICE O/P EST MOD 30 MIN: CPT | Performed by: FAMILY MEDICINE

## 2023-01-24 PROCEDURE — G8427 DOCREV CUR MEDS BY ELIG CLIN: HCPCS | Performed by: FAMILY MEDICINE

## 2023-01-24 PROCEDURE — G8417 CALC BMI ABV UP PARAM F/U: HCPCS | Performed by: FAMILY MEDICINE

## 2023-01-24 PROCEDURE — 3017F COLORECTAL CA SCREEN DOC REV: CPT | Performed by: FAMILY MEDICINE

## 2023-01-24 PROCEDURE — 1036F TOBACCO NON-USER: CPT | Performed by: FAMILY MEDICINE

## 2023-01-24 PROCEDURE — 3078F DIAST BP <80 MM HG: CPT | Performed by: FAMILY MEDICINE

## 2023-01-24 PROCEDURE — 3074F SYST BP LT 130 MM HG: CPT | Performed by: FAMILY MEDICINE

## 2023-01-24 PROCEDURE — G8484 FLU IMMUNIZE NO ADMIN: HCPCS | Performed by: FAMILY MEDICINE

## 2023-01-24 PROCEDURE — 1123F ACP DISCUSS/DSCN MKR DOCD: CPT | Performed by: FAMILY MEDICINE

## 2023-01-24 RX ORDER — VERAPAMIL HYDROCHLORIDE 240 MG/1
240 CAPSULE, EXTENDED RELEASE ORAL NIGHTLY
Qty: 90 CAPSULE | Refills: 1 | Status: SHIPPED | OUTPATIENT
Start: 2023-01-24 | End: 2023-04-24

## 2023-01-24 ASSESSMENT — PATIENT HEALTH QUESTIONNAIRE - PHQ9
SUM OF ALL RESPONSES TO PHQ QUESTIONS 1-9: 0
2. FEELING DOWN, DEPRESSED OR HOPELESS: 0
SUM OF ALL RESPONSES TO PHQ QUESTIONS 1-9: 0
SUM OF ALL RESPONSES TO PHQ QUESTIONS 1-9: 0
1. LITTLE INTEREST OR PLEASURE IN DOING THINGS: 0
SUM OF ALL RESPONSES TO PHQ9 QUESTIONS 1 & 2: 0
SUM OF ALL RESPONSES TO PHQ QUESTIONS 1-9: 0

## 2023-01-24 ASSESSMENT — ENCOUNTER SYMPTOMS
ABDOMINAL PAIN: 0
COUGH: 0
CONSTIPATION: 0
SHORTNESS OF BREATH: 0
BACK PAIN: 0
NAUSEA: 0
VOMITING: 0
WHEEZING: 0
DIARRHEA: 0

## 2023-01-24 NOTE — PROGRESS NOTES
23  Vidhya Lawson : 1950 Sex: male  Age: 71 y.o. Chief Complaint   Patient presents with    Hypertension     HPI:  71 y.o. male patient presents today for 6 month(s) follow up of chronic medical conditions, medication refills and FBW. Patient's chart, medical, surgical and medication history all reviewed. Hypertension   The patient presents today for follow up of HTN. The problem is well controlled. Risk factors for coronary artery disease include Age > 27, male, HTN and elevated cholesterol. Current treatments include clonidine (Catapres), hydrochlorothiazide (HCTZ), lisinopril (Prinivil) and Verapamil. The patient is compliant all of the time. Lifestyle changes the patient has made include  none . Today the patient is complaining of none. Hypothyroidism  Patient presents for routine follow up of Hypothyroidism. Current symptoms: none. Patient denies change in energy level, diarrhea, heat / cold intolerance, nervousness, palpitations and weight changes. Symptoms have been well-controlled. No difficulty swallowing or masses felt. Last TSH was 2.870. Patient is currently taking levothryoxine 25 mcg. Patient has been following with Dr. Tracy Chau for prostate evaluation. Had biopsy a few weeks ago that did show abnormality significant for cancer, but in limited area. Urology is just monitoring. ROS:  Review of Systems   Constitutional:  Negative for chills, fatigue and fever. HENT:  Positive for postnasal drip. Respiratory:  Negative for cough, shortness of breath and wheezing. Cardiovascular:  Negative for chest pain and palpitations. Gastrointestinal:  Negative for abdominal pain, constipation, diarrhea, nausea and vomiting. Musculoskeletal:  Negative for arthralgias and back pain. Skin:  Negative for rash. Neurological:  Negative for dizziness and headaches. Psychiatric/Behavioral:  Negative for dysphoric mood. The patient is not nervous/anxious.     All other systems reviewed and are negative. Current Outpatient Medications on File Prior to Visit   Medication Sig Dispense Refill    lisinopril-hydroCHLOROthiazide (PRINZIDE;ZESTORETIC) 20-12.5 MG per tablet TAKE 1 TABLET BY MOUTH IN THE MORNING 90 tablet 1    cloNIDine (CATAPRES) 0.1 MG tablet TAKE 2 TABLETS BY MOUTH EVERY EVENING 180 tablet 1    tamsulosin (FLOMAX) 0.4 MG capsule TAKE 2 CAPSULES BY MOUTH DAILY 180 capsule 1    levothyroxine (SYNTHROID) 25 MCG tablet Take 1 tablet by mouth in the morning. 90 tablet 1    fluticasone (FLONASE) 50 MCG/ACT nasal spray 2 sprays by Nasal route in the morning. 48 g 3    [DISCONTINUED] tamsulosin (FLOMAX) 0.4 MG capsule TAKE 1 CAPSULE BY MOUTH DAILY 90 capsule 1    [DISCONTINUED] tamsulosin (FLOMAX) 0.4 MG capsule Take 1 capsule by mouth daily 90 capsule 1     No current facility-administered medications on file prior to visit. Allergies   Allergen Reactions    Other      SEASONAL       Past Medical History:   Diagnosis Date    BPH (benign prostatic hyperplasia)     Colon polyp 01/2018    Dr. Yue WEINER (degenerative joint disease) of knee     Hypertension     Spinal stenosis, lumbar      Past Surgical History:   Procedure Laterality Date    CERVICAL LAMINECTOMY  1989    LUMBAR LAMINECTOMY  2010    MYRINGOTOMY Right 01/14/2016    Nasal endoscopy with biopsy,biopsy post nasal pharynx    MYRINGOTOMY Right 01/24/2019    with right eustachian tube balloon dilation    MYRINGOTOMY AND TYMPANOSTOMY TUBE PLACEMENT Left 12/08/2016    Left myringotomy with T-tube insertion    MYRINGOTOMY AND TYMPANOSTOMY TUBE PLACEMENT Left 03/15/2018    Left myringotomy with tympanostomy tube placement     MYRINGOTOMY AND TYMPANOSTOMY TUBE PLACEMENT Right 1/24/2019    RIGHT MYRINGOTOMY TUBE REPLACEMENT. RIGHT EUSTACHIAN TUBE DILATION performed by Justin Tang DO at [address] [place] [address] Left 03/15/2018    Nasal endoscopy with eustachian tube balloon dilation, left. [place] OFFICE/OUTPT VISIT,PROCEDURE ONLY Left 3/15/2018    LEFT EUSTACHIAN TUBE BALLOON DILATION AND LEFT MYRINGOTOMY WITH T-TUBE INSERTION performed by Efrain Faulkner DO at [address] [place] [address]  2003    x2; hydrocelectomy    TONSILLECTOMY      TOTAL KNEE ARTHROPLASTY Right 01/07/2013    Right TKA  JEWELL Nagel MD     Family History   Problem Relation Age of Onset    Stroke Mother     Osteoarthritis Mother     Coronary Art Dis Father     Pacemaker Father     Hypertension Father     No Known Problems Sister      Social History     Socioeconomic History    Marital status:      Spouse name: Not on file    Number of children: Not on file    Years of education: Not on file    Highest education level: Not on file   Occupational History    Occupation: retired , gasoline loads   Tobacco Use    Smoking status: Never    Smokeless tobacco: Never   Vaping Use    Vaping Use: Never used   Substance and Sexual Activity    Alcohol use: No     Alcohol/week: 0.0 standard drinks    Drug use: No    Sexual activity: Yes     Partners: Female   Other Topics Concern    Not on file   Social History Narrative    Not on file     Social Determinants of Health     Financial Resource Strain: Unknown    Difficulty of Paying Living Expenses: Patient refused   Food Insecurity: Unknown    Worried About Running Out of Food in the Last Year: Patient refused    Ran Out of Food in the Last Year: Patient refused   Transportation Needs: Not on file   Physical Activity: Inactive    Days of Exercise per Week: 0 days    Minutes of Exercise per Session: 0 min   Stress: Not on file   Social Connections: Not on file   Intimate Partner Violence: Not on file   Housing Stability: Not on file       Vitals:    01/24/23 0827   BP: 122/70   Pulse: 77   Temp: 97.2 °F (36.2 °C)   SpO2: 97%   Weight: 245 lb (111.1 kg)   Height: 6' (1.829 m)       Physical Exam:  Physical Exam  Vitals and nursing note reviewed.    Constitutional: General: He is not in acute distress. Appearance: Normal appearance. He is well-developed. He is obese. He is not ill-appearing. HENT:      Head: Normocephalic and atraumatic. Right Ear: Hearing and external ear normal.      Left Ear: Hearing and external ear normal.      Nose: Nose normal.      Mouth/Throat:      Mouth: Mucous membranes are moist.   Eyes:      General: Lids are normal. No scleral icterus. Extraocular Movements: Extraocular movements intact. Conjunctiva/sclera: Conjunctivae normal.   Neck:      Thyroid: No thyromegaly. Vascular: No carotid bruit. Cardiovascular:      Rate and Rhythm: Normal rate and regular rhythm. Heart sounds: Normal heart sounds. No murmur heard. Pulmonary:      Effort: Pulmonary effort is normal. No respiratory distress. Breath sounds: Normal breath sounds. No wheezing. Musculoskeletal:         General: No tenderness or deformity. Normal range of motion. Cervical back: Normal range of motion and neck supple. Right lower leg: No edema. Left lower leg: No edema. Lymphadenopathy:      Cervical: No cervical adenopathy. Skin:     General: Skin is warm and dry. Findings: No rash. Neurological:      General: No focal deficit present. Mental Status: He is alert and oriented to person, place, and time.       Gait: Gait normal.   Psychiatric:         Mood and Affect: Mood and affect normal.         Speech: Speech normal.         Behavior: Behavior normal.       Labs:  CBC with Differential:    Lab Results   Component Value Date/Time    WBC 9.3 09/14/2022 02:09 PM    RBC 4.73 09/14/2022 02:09 PM    HGB 14.6 09/14/2022 02:09 PM    HCT 43.4 09/14/2022 02:09 PM     09/14/2022 02:09 PM    MCV 91.8 09/14/2022 02:09 PM    MCH 30.9 09/14/2022 02:09 PM    MCHC 33.6 09/14/2022 02:09 PM    RDW 13.3 09/14/2022 02:09 PM    SEGSPCT 70 01/10/2013 04:27 AM    LYMPHOPCT 16.9 09/14/2022 02:09 PM    MONOPCT 5.5 09/14/2022 02:09 PM BASOPCT 0.5 09/14/2022 02:09 PM    MONOSABS 0.51 09/14/2022 02:09 PM    LYMPHSABS 1.57 09/14/2022 02:09 PM    EOSABS 0.14 09/14/2022 02:09 PM    BASOSABS 0.05 09/14/2022 02:09 PM     CMP:    Lab Results   Component Value Date/Time     09/14/2022 02:09 PM    K 4.0 09/14/2022 02:09 PM    K 4.1 02/19/2021 04:34 PM     09/14/2022 02:09 PM    CO2 26 09/14/2022 02:09 PM    BUN 10 09/14/2022 02:09 PM    CREATININE 1.0 09/14/2022 02:09 PM    GFRAA >60 09/14/2022 02:09 PM    LABGLOM >60 09/14/2022 02:09 PM    GLUCOSE 89 09/14/2022 02:09 PM    PROT 7.1 09/14/2022 02:09 PM    LABALBU 4.4 09/14/2022 02:09 PM    CALCIUM 9.5 09/14/2022 02:09 PM    BILITOT 0.5 09/14/2022 02:09 PM    ALKPHOS 49 09/14/2022 02:09 PM    AST 15 09/14/2022 02:09 PM    ALT 8 09/14/2022 02:09 PM     HgBA1c:    Lab Results   Component Value Date/Time    LABA1C 5.3 07/19/2022 08:55 AM     Microalbumen/Creatinine ratio:  No components found for: RUCREAT  FLP:    Lab Results   Component Value Date/Time    TRIG 85 07/19/2022 08:55 AM    HDL 39 07/19/2022 08:55 AM    LDLCALC 94 07/19/2022 08:55 AM    LABVLDL 17 07/19/2022 08:55 AM     TSH:    Lab Results   Component Value Date/Time    TSH 2.870 07/19/2022 08:55 AM     PSA:   Lab Results   Component Value Date/Time    PSA 5.33 09/27/2022 01:01 PM        Assessment and Plan:  Mary Marcum was seen today for hypertension. Diagnoses and all orders for this visit:    Essential hypertension  -     verapamil (VERELAN) 240 MG extended release capsule; Take 1 capsule by mouth nightly  -     CBC with Auto Differential; Future  -     Comprehensive Metabolic Panel; Future  -     Lipid Panel; Future  -     TSH; Future  -     Urinalysis; Future  -     Uric Acid; Future  Well controlled. Due for fasting labs at this time. Acquired hypothyroidism  -     TSH; Future    Prostate cancer [facility])  Following with Urology. Just monitoring at this time. .     Impaired fasting glucose  -     Hemoglobin A1C; Future    Vitamin D insufficiency  -     Vitamin D 25 Hydroxy; Future          Return in about 6 months (around 7/24/2023), or if symptoms worsen or fail to improve, for AWV.       Seen By:  Leonie Barber, DO

## 2023-03-08 DIAGNOSIS — E03.9 ACQUIRED HYPOTHYROIDISM: ICD-10-CM

## 2023-03-08 RX ORDER — LEVOTHYROXINE SODIUM 0.03 MG/1
25 TABLET ORAL DAILY
Qty: 90 TABLET | Refills: 1 | Status: SHIPPED | OUTPATIENT
Start: 2023-03-08

## 2023-03-14 ENCOUNTER — PROCEDURE VISIT (OUTPATIENT)
Dept: AUDIOLOGY | Age: 73
End: 2023-03-14
Payer: MEDICARE

## 2023-03-14 ENCOUNTER — OFFICE VISIT (OUTPATIENT)
Dept: ENT CLINIC | Age: 73
End: 2023-03-14
Payer: MEDICARE

## 2023-03-14 VITALS — WEIGHT: 249 LBS | HEIGHT: 72 IN | BODY MASS INDEX: 33.72 KG/M2

## 2023-03-14 DIAGNOSIS — H69.83 ETD (EUSTACHIAN TUBE DYSFUNCTION), BILATERAL: Primary | ICD-10-CM

## 2023-03-14 DIAGNOSIS — H65.491 COME (CHRONIC OTITIS MEDIA WITH EFFUSION), RIGHT: ICD-10-CM

## 2023-03-14 DIAGNOSIS — H69.83 CHRONIC DYSFUNCTION OF BOTH EUSTACHIAN TUBES: Primary | ICD-10-CM

## 2023-03-14 PROCEDURE — 3017F COLORECTAL CA SCREEN DOC REV: CPT | Performed by: OTOLARYNGOLOGY

## 2023-03-14 PROCEDURE — 1123F ACP DISCUSS/DSCN MKR DOCD: CPT | Performed by: OTOLARYNGOLOGY

## 2023-03-14 PROCEDURE — G8484 FLU IMMUNIZE NO ADMIN: HCPCS | Performed by: OTOLARYNGOLOGY

## 2023-03-14 PROCEDURE — G8417 CALC BMI ABV UP PARAM F/U: HCPCS | Performed by: OTOLARYNGOLOGY

## 2023-03-14 PROCEDURE — 99213 OFFICE O/P EST LOW 20 MIN: CPT | Performed by: OTOLARYNGOLOGY

## 2023-03-14 PROCEDURE — 92567 TYMPANOMETRY: CPT | Performed by: AUDIOLOGIST

## 2023-03-14 PROCEDURE — 1036F TOBACCO NON-USER: CPT | Performed by: OTOLARYNGOLOGY

## 2023-03-14 PROCEDURE — G8427 DOCREV CUR MEDS BY ELIG CLIN: HCPCS | Performed by: OTOLARYNGOLOGY

## 2023-03-14 NOTE — PROGRESS NOTES
This patient was referred for tympanometric testing by Dr. Magy Pineda due to  history of eustachian tube dysfunction . Tympanometry was attempted, however no seal could be obtained to complete testing due to cerumen impaction, bilaterally. The results were reviewed with the patient. Recommendations for follow up will be made pending physician consult. SABRINA Ambrose.   Audiology Intern (4th Year)

## 2023-03-14 NOTE — PROGRESS NOTES
I have discussed the case, including pertinent history and exam findings with the audiology extern. I have seen and examined the patient and the key elements of the encounter have been performed by me. I agree with the assessment, plan and orders as documented by the audiology extern.    Jemal Santizo CCC/AURORA  Audiologist  [id number]  NPI#:  [id number]

## 2023-03-14 NOTE — PROGRESS NOTES
atraumatic. Right Ear: Ear canal normal. A PE tube is present. Tympanic membrane is scarred. Tympanic membrane is not injected, perforated, erythematous or retracted. Left Ear: Ear canal normal. No PE tube. Tympanic membrane is scarred. Tympanic membrane is not perforated, erythematous or retracted. Nose: No congestion or rhinorrhea. Mouth/Throat:      Mouth: Mucous membranes are moist.   Eyes:      Pupils: Pupils are equal, round, and reactive to light. Neck:      Thyroid: No thyromegaly. Trachea: No tracheal deviation. Cardiovascular:      Rate and Rhythm: Normal rate. Pulmonary:      Effort: Pulmonary effort is normal. No respiratory distress. Musculoskeletal:         General: Normal range of motion. Cervical back: Normal range of motion. Lymphadenopathy:      Cervical: No cervical adenopathy. Skin:     General: Skin is warm. Findings: No erythema. Neurological:      Mental Status: He is alert. Cranial Nerves: No cranial nerve deficit. 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 Patient may follow-up with repeat temp in 4 months. Consider elective hearing aid trial.    Dr. Yousif Sosa.  Otolaryngology Facial Plastic Surgery  :  [facility] Otolaryngology/Facial Plastic Surgery Residency  Associate Clinical Professor:  [facility]

## 2023-03-22 ASSESSMENT — ENCOUNTER SYMPTOMS
VOMITING: 0
SHORTNESS OF BREATH: 0
COUGH: 0

## 2023-03-29 ENCOUNTER — TELEPHONE (OUTPATIENT)
Dept: FAMILY MEDICINE CLINIC | Age: 73
End: 2023-03-29

## 2023-03-29 NOTE — TELEPHONE ENCOUNTER
----- Message from Alana Mirza sent at 3/29/2023  3:11 PM EDT -----  Subject: Appointment Request    Reason for Call: Established Patient Appointment needed: Routine Joint   Pain    QUESTIONS    Reason for appointment request? Available appointments did not meet   patient need     Additional Information for Provider? Patient would like to see Dr. Ramon Chucho   is a close friend and would like to see her as soon as possible.  Please   call patient to schedule an appointment.  ---------------------------------------------------------------------------  --------------  [address]  [phone]; OK to leave message on voicemail  ---------------------------------------------------------------------------  --------------  SCRIPT ANSWERS  COVID Screen: Paige Swanson

## 2023-03-29 NOTE — TELEPHONE ENCOUNTER
Rossy Kelley wanting to get in to see you; he is having some throbbing & swelling in his left knee, possibly arthritis, that has been going on for the last month or so. He would like to get this taken care of before he goes on vacation in May. All I see are Provider [address] [facility] Visits. I did let him know you were currently out of office for the remainder of the week, but let him know I would give him a call as soon as you responded.

## 2023-04-17 ENCOUNTER — TELEPHONE (OUTPATIENT)
Dept: PRIMARY CARE CLINIC | Age: 73
End: 2023-04-17

## 2023-04-17 NOTE — TELEPHONE ENCOUNTER
Spoke to patient advised that I reprinted the referral and gave it to the referral girls and if he didn't hear anything mid week to call curtis office. He did speak to the office and they told him it will be July that they could get him in. Advised him that July was fine with PCP if it is okay with him.  He will wait to hear from them

## 2023-04-17 NOTE — TELEPHONE ENCOUNTER
----- Message from Mattie Cardenas sent at 4/17/2023  9:32 AM EDT -----  Subject: Message to Provider    QUESTIONS  Information for Provider? Pt is calling regarding appt for left knee   replacement from his visit last week. He is suppose to call back this week   if he had not heard anything from the specialist. On Wednesday he had   called the specialist office and they had not received the referral. He   would like the nurse to call him back. ---------------------------------------------------------------------------  --------------  Lyndon CAMPOS  [phone]; OK to leave message on voicemail  ---------------------------------------------------------------------------  --------------  SCRIPT ANSWERS  Relationship to Patient?  Self

## 2023-05-23 LAB
BUN SERPL-MCNC: 12 MG/DL (ref 6–23)
CREAT SERPL-MCNC: 1 MG/DL (ref 0.7–1.2)

## 2023-07-11 ENCOUNTER — OFFICE VISIT (OUTPATIENT)
Dept: ENT CLINIC | Age: 73
End: 2023-07-11
Payer: MEDICARE

## 2023-07-11 VITALS
BODY MASS INDEX: 33.05 KG/M2 | HEART RATE: 68 BPM | DIASTOLIC BLOOD PRESSURE: 77 MMHG | WEIGHT: 244 LBS | HEIGHT: 72 IN | SYSTOLIC BLOOD PRESSURE: 161 MMHG

## 2023-07-11 DIAGNOSIS — H61.23 BILATERAL IMPACTED CERUMEN: ICD-10-CM

## 2023-07-11 DIAGNOSIS — H69.83 ETD (EUSTACHIAN TUBE DYSFUNCTION), BILATERAL: Primary | ICD-10-CM

## 2023-07-11 PROCEDURE — G8417 CALC BMI ABV UP PARAM F/U: HCPCS | Performed by: OTOLARYNGOLOGY

## 2023-07-11 PROCEDURE — 69210 REMOVE IMPACTED EAR WAX UNI: CPT | Performed by: OTOLARYNGOLOGY

## 2023-07-11 PROCEDURE — 99213 OFFICE O/P EST LOW 20 MIN: CPT | Performed by: OTOLARYNGOLOGY

## 2023-07-11 PROCEDURE — 1036F TOBACCO NON-USER: CPT | Performed by: OTOLARYNGOLOGY

## 2023-07-11 PROCEDURE — 3017F COLORECTAL CA SCREEN DOC REV: CPT | Performed by: OTOLARYNGOLOGY

## 2023-07-11 PROCEDURE — 3077F SYST BP >= 140 MM HG: CPT | Performed by: OTOLARYNGOLOGY

## 2023-07-11 PROCEDURE — 1123F ACP DISCUSS/DSCN MKR DOCD: CPT | Performed by: OTOLARYNGOLOGY

## 2023-07-11 PROCEDURE — G8427 DOCREV CUR MEDS BY ELIG CLIN: HCPCS | Performed by: OTOLARYNGOLOGY

## 2023-07-11 PROCEDURE — 3078F DIAST BP <80 MM HG: CPT | Performed by: OTOLARYNGOLOGY

## 2023-07-11 ASSESSMENT — ENCOUNTER SYMPTOMS
VOMITING: 0
SHORTNESS OF BREATH: 0
COUGH: 0

## 2023-07-11 NOTE — PROGRESS NOTES
[facility] Otolaryngology  Dr. Lakshmi Alicea. [facility]. Ms.Ed        Patient Name:  Itzel Quach  :  1950     CHIEF C/O:    Chief Complaint   Patient presents with    Follow-up     4 MO CERUMEN       HISTORY OBTAINED FROM:  patient    HISTORY OF PRESENT ILLNESS:       Chico Esqueda is a 68y.o. year old male, here today for follow up of known history of hearing loss eustachian tube dysfunction status post tympanostomy tube placement. Patient's been doing well, and stable without any associated ear pain drainage or new hearing loss. No fever chills shortness of breath stridor nausea vomit chest pain at this time. Past Medical History:   Diagnosis Date    BPH (benign prostatic hyperplasia)     Colon polyp 2018    Dr. Flora VALLED (degenerative joint disease) of knee     Hypertension     Spinal stenosis, lumbar      Past Surgical History:   Procedure Laterality Date    CERVICAL LAMINECTOMY      LUMBAR LAMINECTOMY      MYRINGOTOMY Right 2016    Nasal endoscopy with biopsy,biopsy post nasal pharynx    MYRINGOTOMY Right 2019    with right eustachian tube balloon dilation    MYRINGOTOMY AND TYMPANOSTOMY TUBE PLACEMENT Left 2016    Left myringotomy with T-tube insertion    MYRINGOTOMY AND TYMPANOSTOMY TUBE PLACEMENT Left 03/15/2018    Left myringotomy with tympanostomy tube placement     MYRINGOTOMY AND TYMPANOSTOMY TUBE PLACEMENT Right 2019    RIGHT MYRINGOTOMY TUBE REPLACEMENT. RIGHT EUSTACHIAN TUBE DILATION performed by Dhara Granados DO at [address] [facility] Left 03/15/2018    Nasal endoscopy with eustachian tube balloon dilation, left. [place] OFFICE/OUTPT VISIT,PROCEDURE ONLY Left 3/15/2018    LEFT EUSTACHIAN TUBE BALLOON DILATION AND LEFT MYRINGOTOMY WITH T-TUBE INSERTION performed by Dhara Granados DO at [address] [place] [address]  2003    x2; hydrocelectomy    TONSILLECTOMY      TOTAL KNEE ARTHROPLASTY Right 2013    Right TKA  T.  Ronald Moreno MD

## 2023-07-25 ENCOUNTER — OFFICE VISIT (OUTPATIENT)
Dept: PRIMARY CARE CLINIC | Age: 73
End: 2023-07-25
Payer: MEDICARE

## 2023-07-25 VITALS
BODY MASS INDEX: 32.1 KG/M2 | TEMPERATURE: 98 F | WEIGHT: 237 LBS | SYSTOLIC BLOOD PRESSURE: 122 MMHG | DIASTOLIC BLOOD PRESSURE: 78 MMHG | HEART RATE: 56 BPM | HEIGHT: 72 IN | OXYGEN SATURATION: 97 %

## 2023-07-25 DIAGNOSIS — Z00.00 MEDICARE ANNUAL WELLNESS VISIT, SUBSEQUENT: Primary | ICD-10-CM

## 2023-07-25 DIAGNOSIS — R73.01 IMPAIRED FASTING GLUCOSE: ICD-10-CM

## 2023-07-25 DIAGNOSIS — E55.9 VITAMIN D INSUFFICIENCY: ICD-10-CM

## 2023-07-25 DIAGNOSIS — E03.9 ACQUIRED HYPOTHYROIDISM: ICD-10-CM

## 2023-07-25 DIAGNOSIS — R35.1 NOCTURIA: ICD-10-CM

## 2023-07-25 DIAGNOSIS — I10 ESSENTIAL HYPERTENSION: ICD-10-CM

## 2023-07-25 DIAGNOSIS — M17.12 PRIMARY OSTEOARTHRITIS OF LEFT KNEE: ICD-10-CM

## 2023-07-25 PROCEDURE — 3078F DIAST BP <80 MM HG: CPT | Performed by: FAMILY MEDICINE

## 2023-07-25 PROCEDURE — G0439 PPPS, SUBSEQ VISIT: HCPCS | Performed by: FAMILY MEDICINE

## 2023-07-25 PROCEDURE — 1123F ACP DISCUSS/DSCN MKR DOCD: CPT | Performed by: FAMILY MEDICINE

## 2023-07-25 PROCEDURE — 3074F SYST BP LT 130 MM HG: CPT | Performed by: FAMILY MEDICINE

## 2023-07-25 PROCEDURE — 3017F COLORECTAL CA SCREEN DOC REV: CPT | Performed by: FAMILY MEDICINE

## 2023-07-25 RX ORDER — TAMSULOSIN HYDROCHLORIDE 0.4 MG/1
0.8 CAPSULE ORAL DAILY
Qty: 180 CAPSULE | Refills: 1 | Status: SHIPPED | OUTPATIENT
Start: 2023-07-25

## 2023-07-25 RX ORDER — LEVOTHYROXINE SODIUM 0.03 MG/1
25 TABLET ORAL DAILY
Qty: 90 TABLET | Refills: 1 | Status: SHIPPED | OUTPATIENT
Start: 2023-07-25

## 2023-07-25 RX ORDER — LISINOPRIL AND HYDROCHLOROTHIAZIDE 20; 12.5 MG/1; MG/1
1 TABLET ORAL DAILY
Qty: 90 TABLET | Refills: 1 | Status: SHIPPED | OUTPATIENT
Start: 2023-07-25

## 2023-07-25 RX ORDER — VERAPAMIL HYDROCHLORIDE 240 MG/1
240 CAPSULE, EXTENDED RELEASE ORAL NIGHTLY
Qty: 90 CAPSULE | Refills: 1 | Status: SHIPPED | OUTPATIENT
Start: 2023-07-25

## 2023-07-25 RX ORDER — CLONIDINE HYDROCHLORIDE 0.1 MG/1
0.2 TABLET ORAL EVERY EVENING
Qty: 180 TABLET | Refills: 1 | Status: SHIPPED | OUTPATIENT
Start: 2023-07-25

## 2023-07-25 SDOH — ECONOMIC STABILITY: FOOD INSECURITY: WITHIN THE PAST 12 MONTHS, THE FOOD YOU BOUGHT JUST DIDN'T LAST AND YOU DIDN'T HAVE MONEY TO GET MORE.: NEVER TRUE

## 2023-07-25 SDOH — ECONOMIC STABILITY: INCOME INSECURITY: HOW HARD IS IT FOR YOU TO PAY FOR THE VERY BASICS LIKE FOOD, HOUSING, MEDICAL CARE, AND HEATING?: NOT HARD AT ALL

## 2023-07-25 SDOH — ECONOMIC STABILITY: HOUSING INSECURITY
IN THE LAST 12 MONTHS, WAS THERE A TIME WHEN YOU DID NOT HAVE A STEADY PLACE TO SLEEP OR SLEPT IN A SHELTER (INCLUDING NOW)?: NO

## 2023-07-25 SDOH — ECONOMIC STABILITY: FOOD INSECURITY: WITHIN THE PAST 12 MONTHS, YOU WORRIED THAT YOUR FOOD WOULD RUN OUT BEFORE YOU GOT MONEY TO BUY MORE.: NEVER TRUE

## 2023-07-25 ASSESSMENT — LIFESTYLE VARIABLES
HOW MANY STANDARD DRINKS CONTAINING ALCOHOL DO YOU HAVE ON A TYPICAL DAY: PATIENT DOES NOT DRINK
HOW OFTEN DO YOU HAVE A DRINK CONTAINING ALCOHOL: NEVER

## 2023-07-25 ASSESSMENT — PATIENT HEALTH QUESTIONNAIRE - PHQ9
SUM OF ALL RESPONSES TO PHQ QUESTIONS 1-9: 0
2. FEELING DOWN, DEPRESSED OR HOPELESS: 0
SUM OF ALL RESPONSES TO PHQ QUESTIONS 1-9: 0
1. LITTLE INTEREST OR PLEASURE IN DOING THINGS: 0
SUM OF ALL RESPONSES TO PHQ QUESTIONS 1-9: 0
SUM OF ALL RESPONSES TO PHQ QUESTIONS 1-9: 0
SUM OF ALL RESPONSES TO PHQ9 QUESTIONS 1 & 2: 0

## 2023-07-25 NOTE — PROGRESS NOTES
Medicare Annual Wellness Visit    Laney Gleason is here for Medicare AWV    Assessment & Plan   Medicare annual wellness visit, subsequent  HRA reviewed and addressed. UTD on HM. Due for labs, but will wait until right before pre-op appt    Essential hypertension  -     cloNIDine (CATAPRES) 0.1 MG tablet; Take 2 tablets by mouth every evening, Disp-180 tablet, R-1Normal  -     lisinopril-hydroCHLOROthiazide (PRINZIDE;ZESTORETIC) 20-12.5 MG per tablet; Take 1 tablet by mouth daily, Disp-90 tablet, R-1Normal  -     verapamil (VERELAN) 240 MG extended release capsule; Take 1 capsule by mouth nightly, Disp-90 capsule, R-1Normal  -     CBC with Auto Differential; Future  -     Comprehensive Metabolic Panel; Future  -     Lipid Panel; Future  -     TSH; Future  -     Vitamin B12 & Folate; Future  -     Urinalysis; Future  -     Uric Acid; Future  -     T4, Free; Future    Acquired hypothyroidism  -     levothyroxine (SYNTHROID) 25 MCG tablet; Take 1 tablet by mouth Daily, Disp-90 tablet, R-1Normal  -     TSH; Future  -     T4, Free; Future    Nocturia  -     tamsulosin (FLOMAX) 0.4 MG capsule; Take 2 capsules by mouth daily, Disp-180 capsule, R-1Normal    Primary osteoarthritis of left knee  Planning TKA in October. Pre-op needed in Sept    Impaired fasting glucose  -     Hemoglobin A1C; Future    Vitamin D insufficiency  -     Vitamin D 25 Hydroxy; Future    Recommendations for Preventive Services Due: see orders and patient instructions/AVS.  Recommended screening schedule for the next 5-10 years is provided to the patient in written form: see Patient Instructions/AVS.     Return in about 7 weeks (around 9/11/2023), or if symptoms worsen or fail to improve, for Pre-op L TKA (10/4/23). Subjective   The following acute and/or chronic problems were also addressed today:    Continues to note issues with L knee pain. Mld improvement in with intra-articular injection. Planning TKA with Dr. Keisha Brown in October.

## 2023-09-07 DIAGNOSIS — E55.9 VITAMIN D INSUFFICIENCY: ICD-10-CM

## 2023-09-07 DIAGNOSIS — R73.01 IMPAIRED FASTING GLUCOSE: ICD-10-CM

## 2023-09-07 DIAGNOSIS — E03.9 ACQUIRED HYPOTHYROIDISM: ICD-10-CM

## 2023-09-07 DIAGNOSIS — I10 ESSENTIAL HYPERTENSION: ICD-10-CM

## 2023-09-07 LAB
ABSOLUTE IMMATURE GRANULOCYTE: 0.08 K/UL (ref 0–0.58)
ALBUMIN SERPL-MCNC: 4.3 G/DL (ref 3.5–5.2)
ALP BLD-CCNC: 48 U/L (ref 40–129)
ALT SERPL-CCNC: 9 U/L (ref 0–40)
AMORPHOUS: PRESENT
ANION GAP SERPL CALCULATED.3IONS-SCNC: 13 MMOL/L (ref 7–16)
AST SERPL-CCNC: 13 U/L (ref 0–39)
BACTERIA: ABNORMAL
BASOPHILS ABSOLUTE: 0.08 K/UL (ref 0–0.2)
BASOPHILS RELATIVE PERCENT: 1 % (ref 0–2)
BILIRUB SERPL-MCNC: 0.8 MG/DL (ref 0–1.2)
BILIRUBIN URINE: NEGATIVE
BUN BLDV-MCNC: 15 MG/DL (ref 6–23)
CALCIUM SERPL-MCNC: 9.6 MG/DL (ref 8.6–10.2)
CHLORIDE BLD-SCNC: 102 MMOL/L (ref 98–107)
CHOLESTEROL: 139 MG/DL
CO2: 23 MMOL/L (ref 22–29)
COLOR: YELLOW
CREAT SERPL-MCNC: 1.1 MG/DL (ref 0.7–1.2)
EOSINOPHILS ABSOLUTE: 0.22 K/UL (ref 0.05–0.5)
EOSINOPHILS RELATIVE PERCENT: 2 % (ref 0–6)
FOLATE: 6.9 NG/ML (ref 4.8–24.2)
GFR SERPL CREATININE-BSD FRML MDRD: >60 ML/MIN/1.73M2
GLUCOSE BLD-MCNC: 104 MG/DL (ref 74–99)
GLUCOSE URINE: NEGATIVE MG/DL
HBA1C MFR BLD: 5.6 % (ref 4–5.6)
HCT VFR BLD CALC: 39.5 % (ref 37–54)
HDLC SERPL-MCNC: 38 MG/DL
HEMOGLOBIN: 13 G/DL (ref 12.5–16.5)
IMMATURE GRANULOCYTES: 1 % (ref 0–5)
KETONES, URINE: NEGATIVE MG/DL
LDL CHOLESTEROL: 85 MG/DL
LEUKOCYTE ESTERASE, URINE: ABNORMAL
LYMPHOCYTES ABSOLUTE: 1.42 K/UL (ref 1.5–4)
LYMPHOCYTES RELATIVE PERCENT: 13 % (ref 20–42)
MCH RBC QN AUTO: 30.7 PG (ref 26–35)
MCHC RBC AUTO-ENTMCNC: 32.9 G/DL (ref 32–34.5)
MCV RBC AUTO: 93.4 FL (ref 80–99.9)
MONOCYTES ABSOLUTE: 0.72 K/UL (ref 0.1–0.95)
MONOCYTES RELATIVE PERCENT: 7 % (ref 2–12)
NEUTROPHILS ABSOLUTE: 8.38 K/UL (ref 1.8–7.3)
NEUTROPHILS RELATIVE PERCENT: 77 % (ref 43–80)
NITRITE, URINE: POSITIVE
PDW BLD-RTO: 13.9 % (ref 11.5–15)
PH UA: 6 (ref 5–9)
PLATELET # BLD: 199 K/UL (ref 130–450)
PMV BLD AUTO: 10.4 FL (ref 7–12)
POTASSIUM SERPL-SCNC: 3.8 MMOL/L (ref 3.5–5)
PROTEIN UA: NEGATIVE MG/DL
RBC # BLD: 4.23 M/UL (ref 3.8–5.8)
RBC UA: ABNORMAL /HPF
SODIUM BLD-SCNC: 138 MMOL/L (ref 132–146)
SPECIFIC GRAVITY UA: 1.01 (ref 1–1.03)
T4 FREE: 1.3 NG/DL (ref 0.9–1.7)
TOTAL PROTEIN: 6.8 G/DL (ref 6.4–8.3)
TRIGL SERPL-MCNC: 81 MG/DL
TSH SERPL DL<=0.05 MIU/L-ACNC: 3.19 UIU/ML (ref 0.27–4.2)
TURBIDITY: CLEAR
URIC ACID: 6.5 MG/DL (ref 3.4–7)
URINE HGB: NEGATIVE
UROBILINOGEN, URINE: 0.2 EU/DL (ref 0–1)
VITAMIN B-12: 304 PG/ML (ref 211–946)
VITAMIN D 25-HYDROXY: 81.8 NG/ML (ref 30–100)
VLDLC SERPL CALC-MCNC: 16 MG/DL
WBC # BLD: 10.9 K/UL (ref 4.5–11.5)
WBC UA: ABNORMAL /HPF

## 2023-09-12 ENCOUNTER — OFFICE VISIT (OUTPATIENT)
Dept: PRIMARY CARE CLINIC | Age: 73
End: 2023-09-12
Payer: MEDICARE

## 2023-09-12 VITALS
TEMPERATURE: 98 F | HEART RATE: 62 BPM | OXYGEN SATURATION: 98 % | WEIGHT: 238 LBS | BODY MASS INDEX: 32.23 KG/M2 | SYSTOLIC BLOOD PRESSURE: 120 MMHG | DIASTOLIC BLOOD PRESSURE: 64 MMHG | HEIGHT: 72 IN

## 2023-09-12 DIAGNOSIS — M17.12 PRIMARY OSTEOARTHRITIS OF LEFT KNEE: Primary | ICD-10-CM

## 2023-09-12 DIAGNOSIS — N30.00 ACUTE CYSTITIS WITHOUT HEMATURIA: ICD-10-CM

## 2023-09-12 DIAGNOSIS — Z01.818 PRE-OP EXAM: ICD-10-CM

## 2023-09-12 PROCEDURE — G8427 DOCREV CUR MEDS BY ELIG CLIN: HCPCS | Performed by: FAMILY MEDICINE

## 2023-09-12 PROCEDURE — 1036F TOBACCO NON-USER: CPT | Performed by: FAMILY MEDICINE

## 2023-09-12 PROCEDURE — 3017F COLORECTAL CA SCREEN DOC REV: CPT | Performed by: FAMILY MEDICINE

## 2023-09-12 PROCEDURE — 93000 ELECTROCARDIOGRAM COMPLETE: CPT | Performed by: FAMILY MEDICINE

## 2023-09-12 PROCEDURE — 1123F ACP DISCUSS/DSCN MKR DOCD: CPT | Performed by: FAMILY MEDICINE

## 2023-09-12 PROCEDURE — 3074F SYST BP LT 130 MM HG: CPT | Performed by: FAMILY MEDICINE

## 2023-09-12 PROCEDURE — 99214 OFFICE O/P EST MOD 30 MIN: CPT | Performed by: FAMILY MEDICINE

## 2023-09-12 PROCEDURE — 3078F DIAST BP <80 MM HG: CPT | Performed by: FAMILY MEDICINE

## 2023-09-12 PROCEDURE — G8417 CALC BMI ABV UP PARAM F/U: HCPCS | Performed by: FAMILY MEDICINE

## 2023-09-12 RX ORDER — LIDOCAINE 50 MG/G
OINTMENT TOPICAL
Qty: 50 G | Refills: 1 | Status: SHIPPED | OUTPATIENT
Start: 2023-09-12

## 2023-09-12 RX ORDER — CEPHALEXIN 500 MG/1
500 CAPSULE ORAL 2 TIMES DAILY
Qty: 14 CAPSULE | Refills: 0 | Status: SHIPPED | OUTPATIENT
Start: 2023-09-12 | End: 2023-09-19

## 2023-09-12 ASSESSMENT — ENCOUNTER SYMPTOMS
VOMITING: 0
SHORTNESS OF BREATH: 0
COUGH: 0
WHEEZING: 0
BACK PAIN: 0
ABDOMINAL PAIN: 0
NAUSEA: 0
CONSTIPATION: 0
DIARRHEA: 0

## 2023-09-12 NOTE — PROGRESS NOTES
23  Kaushal Toney : 1950 Sex: male  Age: 68 y.o. Chief Complaint   Patient presents with    Pre-op Exam     HPI:  68 y.o. male presents today for pre-op exam.  Patient's chart, medical, surgical and medication history all reviewed. Pre-Operative Risk assessment using 2014 ACC/AHA guidelines   Procedure: L TKA   Date of procedure:  10/4/23  Surgeon: Dr. America Whelan  Emergent procedure No  Active Cardiac Condition No (decompensated HF, Arrhythmia, MI <3 weeks, severe valve disease)  Risk Level of Procedure Intermediate Risk (intraperitoneal, intrathoracic, HENT, orthopedic, or carotid endarterectomy, etc.)  Revised Cardiac Risk Index Risk factors: None  Measurement of Exercise Tolerance before Surgery >4 Yes    According to the 2014 ACC/AHA pre-operative risk assessment guidelines Kaushal Toney is a low risk for major cardiac complications during a intermediate risk procedure and may continue as planned. Specific medication recommendations are listed below. Medications recommended to continue should be taken with a sip of water even when NPO. According to [place] perioperative risk calculation, there is a 0.18% risk of myocardial infarction or cardiac arrest, intraoperatively or up to 30 days post-op. Further recommendations from consultants: None    Medication Recommendations:  ACEI/ARB Continue the day of surgery  Calcium Channel Blocker should be continued the day of surgery   Diuretics HOLD the morning dose on the day of surgery    ROS:  Review of Systems   Constitutional:  Negative for chills, fatigue and fever. HENT:  Positive for postnasal drip. Respiratory:  Negative for cough, shortness of breath and wheezing. Cardiovascular:  Negative for chest pain and palpitations. Gastrointestinal:  Negative for abdominal pain, constipation, diarrhea, nausea and vomiting. Musculoskeletal:  Positive for arthralgias, gait problem and joint swelling. Negative for back pain.    Skin:

## 2023-10-04 ENCOUNTER — OFFICE VISIT (OUTPATIENT)
Dept: PRIMARY CARE CLINIC | Age: 73
End: 2023-10-04
Payer: MEDICARE

## 2023-10-04 VITALS
WEIGHT: 240 LBS | TEMPERATURE: 97.1 F | HEART RATE: 67 BPM | BODY MASS INDEX: 32.51 KG/M2 | SYSTOLIC BLOOD PRESSURE: 118 MMHG | DIASTOLIC BLOOD PRESSURE: 60 MMHG | OXYGEN SATURATION: 96 % | HEIGHT: 72 IN

## 2023-10-04 DIAGNOSIS — R60.0 LOCALIZED EDEMA: ICD-10-CM

## 2023-10-04 DIAGNOSIS — M79.89 LEFT LEG SWELLING: Primary | ICD-10-CM

## 2023-10-04 PROCEDURE — 1123F ACP DISCUSS/DSCN MKR DOCD: CPT | Performed by: FAMILY MEDICINE

## 2023-10-04 PROCEDURE — G8427 DOCREV CUR MEDS BY ELIG CLIN: HCPCS | Performed by: FAMILY MEDICINE

## 2023-10-04 PROCEDURE — 3078F DIAST BP <80 MM HG: CPT | Performed by: FAMILY MEDICINE

## 2023-10-04 PROCEDURE — 1036F TOBACCO NON-USER: CPT | Performed by: FAMILY MEDICINE

## 2023-10-04 PROCEDURE — 99213 OFFICE O/P EST LOW 20 MIN: CPT | Performed by: FAMILY MEDICINE

## 2023-10-04 PROCEDURE — 3017F COLORECTAL CA SCREEN DOC REV: CPT | Performed by: FAMILY MEDICINE

## 2023-10-04 PROCEDURE — G8417 CALC BMI ABV UP PARAM F/U: HCPCS | Performed by: FAMILY MEDICINE

## 2023-10-04 PROCEDURE — G8484 FLU IMMUNIZE NO ADMIN: HCPCS | Performed by: FAMILY MEDICINE

## 2023-10-04 PROCEDURE — 3074F SYST BP LT 130 MM HG: CPT | Performed by: FAMILY MEDICINE

## 2023-10-04 RX ORDER — FUROSEMIDE 20 MG/1
20 TABLET ORAL DAILY
Qty: 30 TABLET | Refills: 0 | Status: SHIPPED
Start: 2023-10-04 | End: 2023-10-05 | Stop reason: SDUPTHER

## 2023-10-04 ASSESSMENT — ENCOUNTER SYMPTOMS
DIARRHEA: 0
BACK PAIN: 0
COUGH: 0
ABDOMINAL PAIN: 0
VOMITING: 0
CONSTIPATION: 0
SHORTNESS OF BREATH: 0
WHEEZING: 0
NAUSEA: 0

## 2023-10-05 DIAGNOSIS — M79.89 LEFT LEG SWELLING: ICD-10-CM

## 2023-10-05 DIAGNOSIS — R60.0 LOCALIZED EDEMA: ICD-10-CM

## 2023-10-05 RX ORDER — FUROSEMIDE 20 MG/1
20 TABLET ORAL DAILY
Qty: 30 TABLET | Refills: 0 | Status: SHIPPED | OUTPATIENT
Start: 2023-10-05

## 2023-10-05 NOTE — PROGRESS NOTES
09/07/2023 08:24 AM        Assessment and Plan:  Clary Mendez was seen today for foot swelling. Diagnoses and all orders for this visit:    Left leg swelling  -     furosemide (LASIX) 20 MG tablet; Take 1 tablet by mouth daily  -     Echocardiogram complete; Future    Localized edema  -     furosemide (LASIX) 20 MG tablet; Take 1 tablet by mouth daily  -     Echocardiogram complete; Future    Suspect symptoms due to PVD and positional sleeping. US negative for DVT. Check Echo to rule out cardiac cause. Trial of Lasix. Return if symptoms worsen or fail to improve.       Seen By:  Serina Arias DO

## 2023-10-06 ENCOUNTER — HOSPITAL ENCOUNTER (OUTPATIENT)
Dept: CARDIOLOGY | Age: 73
End: 2023-10-06
Payer: MEDICARE

## 2023-10-06 DIAGNOSIS — M79.89 LEFT LEG SWELLING: ICD-10-CM

## 2023-10-06 DIAGNOSIS — R60.0 LOCALIZED EDEMA: ICD-10-CM

## 2023-10-06 PROCEDURE — 93306 TTE W/DOPPLER COMPLETE: CPT | Performed by: PSYCHIATRY & NEUROLOGY

## 2023-10-27 DIAGNOSIS — I10 ESSENTIAL HYPERTENSION: ICD-10-CM

## 2023-10-27 RX ORDER — CLONIDINE HYDROCHLORIDE 0.1 MG/1
0.2 TABLET ORAL EVERY EVENING
Qty: 180 TABLET | Refills: 1 | Status: SHIPPED | OUTPATIENT
Start: 2023-10-27

## 2023-11-07 ENCOUNTER — TELEPHONE (OUTPATIENT)
Dept: PRIMARY CARE CLINIC | Age: 73
End: 2023-11-07

## 2023-11-07 NOTE — TELEPHONE ENCOUNTER
----- Message from Alanis Barnes sent at 11/7/2023 11:52 AM EST -----  Subject: Message to Provider    QUESTIONS  Information for Provider? PT wants to speak with someone today about an   upcoming surgery he is having on 11/13.   ---------------------------------------------------------------------------  --------------  [facility] [facility] INFO  [phone]; OK to leave message on voicemail  ---------------------------------------------------------------------------  --------------  SCRIPT ANSWERS  Relationship to Patient?  Self

## 2023-11-08 NOTE — TELEPHONE ENCOUNTER
Pt wanted to update you that he is scheduled for knee replacement next Mon with Dr Anne Morrison.  Per pt Dr Anne Morrison will not need another [facility]

## 2023-11-20 ENCOUNTER — TELEPHONE (OUTPATIENT)
Dept: PRIMARY CARE CLINIC | Age: 73
End: 2023-11-20

## 2023-11-20 RX ORDER — FUROSEMIDE 20 MG/1
20 TABLET ORAL DAILY
Qty: 30 TABLET | Refills: 0 | Status: SHIPPED | OUTPATIENT
Start: 2023-11-20

## 2023-11-20 NOTE — TELEPHONE ENCOUNTER
The pt had a left knee replacement a week ago, he is calling because his ankle and foot are pretty swollen, he is asking if he can go back on the furosemide again

## 2023-12-28 ENCOUNTER — TELEPHONE (OUTPATIENT)
Dept: PRIMARY CARE CLINIC | Age: 73
End: 2023-12-28

## 2023-12-28 NOTE — TELEPHONE ENCOUNTER
----- Message from Angely Pope sent at 12/28/2023  9:36 AM EST -----  Subject: Appointment Request    Reason for Call: Established Patient Appointment needed: Routine Pre-Op    QUESTIONS    Reason for appointment request? Available appointments did not meet   patient need     Additional Information for Provider? Right hip replacement feb 28th, Dr. Alvarado Almaguer in [facility], did not say he needed an EKG. Needs appt between   Jan20th and 28th.  Please call back with appt.  ---------------------------------------------------------------------------  --------------  Jackie ABDUL  [phone]; OK to leave message on voicemail  ---------------------------------------------------------------------------  --------------  SCRIPT ANSWERS

## 2024-01-17 ENCOUNTER — OFFICE VISIT (OUTPATIENT)
Dept: ENT CLINIC | Age: 74
End: 2024-01-17
Payer: MEDICARE

## 2024-01-17 VITALS
SYSTOLIC BLOOD PRESSURE: 127 MMHG | WEIGHT: 239 LBS | DIASTOLIC BLOOD PRESSURE: 73 MMHG | BODY MASS INDEX: 32.41 KG/M2 | HEART RATE: 71 BPM

## 2024-01-17 DIAGNOSIS — H69.83 ETD (EUSTACHIAN TUBE DYSFUNCTION), BILATERAL: ICD-10-CM

## 2024-01-17 DIAGNOSIS — H61.23 BILATERAL IMPACTED CERUMEN: Primary | ICD-10-CM

## 2024-01-17 PROCEDURE — 3017F COLORECTAL CA SCREEN DOC REV: CPT | Performed by: OTOLARYNGOLOGY

## 2024-01-17 PROCEDURE — G8484 FLU IMMUNIZE NO ADMIN: HCPCS | Performed by: OTOLARYNGOLOGY

## 2024-01-17 PROCEDURE — 1036F TOBACCO NON-USER: CPT | Performed by: OTOLARYNGOLOGY

## 2024-01-17 PROCEDURE — 69210 REMOVE IMPACTED EAR WAX UNI: CPT | Performed by: OTOLARYNGOLOGY

## 2024-01-17 PROCEDURE — 3078F DIAST BP <80 MM HG: CPT | Performed by: OTOLARYNGOLOGY

## 2024-01-17 PROCEDURE — 99214 OFFICE O/P EST MOD 30 MIN: CPT | Performed by: OTOLARYNGOLOGY

## 2024-01-17 PROCEDURE — G8417 CALC BMI ABV UP PARAM F/U: HCPCS | Performed by: OTOLARYNGOLOGY

## 2024-01-17 PROCEDURE — G8427 DOCREV CUR MEDS BY ELIG CLIN: HCPCS | Performed by: OTOLARYNGOLOGY

## 2024-01-17 PROCEDURE — 1123F ACP DISCUSS/DSCN MKR DOCD: CPT | Performed by: OTOLARYNGOLOGY

## 2024-01-17 PROCEDURE — 3074F SYST BP LT 130 MM HG: CPT | Performed by: OTOLARYNGOLOGY

## 2024-01-17 ASSESSMENT — ENCOUNTER SYMPTOMS
SHORTNESS OF BREATH: 0
COUGH: 0
VOMITING: 0

## 2024-01-17 NOTE — PROGRESS NOTES
[facility]  Dr. Jennings. IGLESIA Frost Ms.Ed        Patient Name:  Félix White  :  1950     CHIEF C/O:    Chief Complaint   Patient presents with    Follow-up     Cerumen removal        HISTORY OBTAINED FROM:  patient    HISTORY OF PRESENT ILLNESS:       Félix is a 73 y.o. year old male, here today for follow up of known history of hearing loss, eustachian tube dysfunction status post tympanostomy tube placement.  Patient is also here for routine ear cleaning. Patient's been doing well, and stable without any associated ear pain drainage or new hearing loss. No fever chills shortness of breath stridor nausea vomit chest pain at this time.      Past Medical History:   Diagnosis Date    BPH (benign prostatic hyperplasia)     Colon polyp 2018    Dr. Christine WEINER (degenerative joint disease) of knee     Hypertension     Spinal stenosis, lumbar      Past Surgical History:   Procedure Laterality Date    CERVICAL LAMINECTOMY      LUMBAR LAMINECTOMY      MYRINGOTOMY Right 2016    Nasal endoscopy with biopsy,biopsy post nasal pharynx    MYRINGOTOMY Right 2019    with right eustachian tube balloon dilation    MYRINGOTOMY AND TYMPANOSTOMY TUBE PLACEMENT Left 2016    Left myringotomy with T-tube insertion    MYRINGOTOMY AND TYMPANOSTOMY TUBE PLACEMENT Left 03/15/2018    Left myringotomy with tympanostomy tube placement     MYRINGOTOMY AND TYMPANOSTOMY TUBE PLACEMENT Right 2019    RIGHT MYRINGOTOMY TUBE REPLACEMENT. RIGHT EUSTACHIAN TUBE DILATION performed by Glen Frost DO at [facility] OR    NASAL ENDOSCOPY Left 03/15/2018    Nasal endoscopy with eustachian tube balloon dilation, left.    VT OFFICE/OUTPT VISIT,PROCEDURE ONLY Left 3/15/2018    LEFT EUSTACHIAN TUBE BALLOON DILATION AND LEFT MYRINGOTOMY WITH T-TUBE INSERTION performed by Glen Frost DO at [facility] OR    TESTICLE SURGERY  2003    x2; hydrocelectomy    TONSILLECTOMY      TOTAL KNEE

## 2024-01-22 DIAGNOSIS — I10 ESSENTIAL HYPERTENSION: ICD-10-CM

## 2024-01-22 RX ORDER — VERAPAMIL HYDROCHLORIDE 240 MG/1
240 CAPSULE, EXTENDED RELEASE ORAL NIGHTLY
Qty: 90 CAPSULE | Refills: 1 | Status: SHIPPED | OUTPATIENT
Start: 2024-01-22

## 2024-02-01 ENCOUNTER — OFFICE VISIT (OUTPATIENT)
Dept: PRIMARY CARE CLINIC | Age: 74
End: 2024-02-01
Payer: MEDICARE

## 2024-02-01 VITALS
SYSTOLIC BLOOD PRESSURE: 124 MMHG | TEMPERATURE: 97.8 F | HEART RATE: 69 BPM | HEIGHT: 72 IN | WEIGHT: 236 LBS | DIASTOLIC BLOOD PRESSURE: 70 MMHG | BODY MASS INDEX: 31.97 KG/M2 | OXYGEN SATURATION: 98 %

## 2024-02-01 DIAGNOSIS — Z01.818 PRE-OP EXAM: ICD-10-CM

## 2024-02-01 DIAGNOSIS — E03.9 ACQUIRED HYPOTHYROIDISM: ICD-10-CM

## 2024-02-01 DIAGNOSIS — R73.01 IMPAIRED FASTING GLUCOSE: ICD-10-CM

## 2024-02-01 DIAGNOSIS — M16.11 PRIMARY OSTEOARTHRITIS OF RIGHT HIP: Primary | ICD-10-CM

## 2024-02-01 DIAGNOSIS — E55.9 VITAMIN D INSUFFICIENCY: ICD-10-CM

## 2024-02-01 DIAGNOSIS — I10 ESSENTIAL HYPERTENSION: ICD-10-CM

## 2024-02-01 DIAGNOSIS — R21 RASH AND NONSPECIFIC SKIN ERUPTION: ICD-10-CM

## 2024-02-01 DIAGNOSIS — M16.11 PRIMARY OSTEOARTHRITIS OF RIGHT HIP: ICD-10-CM

## 2024-02-01 PROBLEM — Z85.46 HISTORY OF PROSTATE CANCER: Status: ACTIVE | Noted: 2024-02-01

## 2024-02-01 PROBLEM — C61 PROSTATE CANCER (HCC): Status: RESOLVED | Noted: 2023-01-24 | Resolved: 2024-02-01

## 2024-02-01 LAB
ABSOLUTE IMMATURE GRANULOCYTE: 0.03 K/UL (ref 0–0.58)
ALBUMIN SERPL-MCNC: 4.4 G/DL (ref 3.5–5.2)
ALP BLD-CCNC: 59 U/L (ref 40–129)
ALT SERPL-CCNC: 9 U/L (ref 0–40)
ANION GAP SERPL CALCULATED.3IONS-SCNC: 14 MMOL/L (ref 7–16)
AST SERPL-CCNC: 14 U/L (ref 0–39)
BASOPHILS ABSOLUTE: 0.09 K/UL (ref 0–0.2)
BASOPHILS RELATIVE PERCENT: 1 % (ref 0–2)
BILIRUB SERPL-MCNC: 0.7 MG/DL (ref 0–1.2)
BILIRUBIN URINE: NEGATIVE
BUN BLDV-MCNC: 18 MG/DL (ref 6–23)
CALCIUM SERPL-MCNC: 9.5 MG/DL (ref 8.6–10.2)
CHLORIDE BLD-SCNC: 104 MMOL/L (ref 98–107)
CHOLESTEROL: 151 MG/DL
CO2: 24 MMOL/L (ref 22–29)
COLOR: YELLOW
COMMENT: NORMAL
CREAT SERPL-MCNC: 1.1 MG/DL (ref 0.7–1.2)
EOSINOPHILS ABSOLUTE: 0.12 K/UL (ref 0.05–0.5)
EOSINOPHILS RELATIVE PERCENT: 1 % (ref 0–6)
GFR SERPL CREATININE-BSD FRML MDRD: >60 ML/MIN/1.73M2
GLUCOSE BLD-MCNC: 108 MG/DL (ref 74–99)
GLUCOSE URINE: NEGATIVE MG/DL
HBA1C MFR BLD: 5.7 % (ref 4–5.6)
HCT VFR BLD CALC: 40 % (ref 37–54)
HDLC SERPL-MCNC: 43 MG/DL
HEMOGLOBIN: 13.1 G/DL (ref 12.5–16.5)
IMMATURE GRANULOCYTES: 0 % (ref 0–5)
INR BLD: 1.1
KETONES, URINE: NEGATIVE MG/DL
LDL CHOLESTEROL: 90 MG/DL
LEUKOCYTE ESTERASE, URINE: NEGATIVE
LYMPHOCYTES ABSOLUTE: 1.22 K/UL (ref 1.5–4)
LYMPHOCYTES RELATIVE PERCENT: 14 % (ref 20–42)
MCH RBC QN AUTO: 30.7 PG (ref 26–35)
MCHC RBC AUTO-ENTMCNC: 32.8 G/DL (ref 32–34.5)
MCV RBC AUTO: 93.7 FL (ref 80–99.9)
MONOCYTES ABSOLUTE: 0.5 K/UL (ref 0.1–0.95)
MONOCYTES RELATIVE PERCENT: 6 % (ref 2–12)
NEUTROPHILS ABSOLUTE: 6.97 K/UL (ref 1.8–7.3)
NEUTROPHILS RELATIVE PERCENT: 78 % (ref 43–80)
NITRITE, URINE: NEGATIVE
PARTIAL THROMBOPLASTIN TIME: 34.2 SEC (ref 24.5–35.1)
PDW BLD-RTO: 13.8 % (ref 11.5–15)
PH UA: 6 (ref 5–9)
PLATELET # BLD: 267 K/UL (ref 130–450)
PMV BLD AUTO: 10.2 FL (ref 7–12)
POTASSIUM SERPL-SCNC: 4.1 MMOL/L (ref 3.5–5)
PROTEIN UA: NEGATIVE MG/DL
PROTHROMBIN TIME: 11.8 SEC (ref 9.3–12.4)
RBC # BLD: 4.27 M/UL (ref 3.8–5.8)
SODIUM BLD-SCNC: 142 MMOL/L (ref 132–146)
SPECIFIC GRAVITY UA: 1.02 (ref 1–1.03)
TOTAL PROTEIN: 7.1 G/DL (ref 6.4–8.3)
TRIGL SERPL-MCNC: 92 MG/DL
TSH SERPL DL<=0.05 MIU/L-ACNC: 3.05 UIU/ML (ref 0.27–4.2)
TURBIDITY: CLEAR
URIC ACID: 7.3 MG/DL (ref 3.4–7)
URINE HGB: NEGATIVE
UROBILINOGEN, URINE: 0.2 EU/DL (ref 0–1)
VITAMIN D 25-HYDROXY: 102.9 NG/ML (ref 30–100)
VLDLC SERPL CALC-MCNC: 18 MG/DL
WBC # BLD: 8.9 K/UL (ref 4.5–11.5)

## 2024-02-01 PROCEDURE — 99214 OFFICE O/P EST MOD 30 MIN: CPT | Performed by: FAMILY MEDICINE

## 2024-02-01 PROCEDURE — G8484 FLU IMMUNIZE NO ADMIN: HCPCS | Performed by: FAMILY MEDICINE

## 2024-02-01 PROCEDURE — G8417 CALC BMI ABV UP PARAM F/U: HCPCS | Performed by: FAMILY MEDICINE

## 2024-02-01 PROCEDURE — 3078F DIAST BP <80 MM HG: CPT | Performed by: FAMILY MEDICINE

## 2024-02-01 PROCEDURE — 3074F SYST BP LT 130 MM HG: CPT | Performed by: FAMILY MEDICINE

## 2024-02-01 PROCEDURE — G8427 DOCREV CUR MEDS BY ELIG CLIN: HCPCS | Performed by: FAMILY MEDICINE

## 2024-02-01 PROCEDURE — 1036F TOBACCO NON-USER: CPT | Performed by: FAMILY MEDICINE

## 2024-02-01 PROCEDURE — 3017F COLORECTAL CA SCREEN DOC REV: CPT | Performed by: FAMILY MEDICINE

## 2024-02-01 PROCEDURE — 1123F ACP DISCUSS/DSCN MKR DOCD: CPT | Performed by: FAMILY MEDICINE

## 2024-02-01 RX ORDER — OXYCODONE AND ACETAMINOPHEN 7.5; 325 MG/1; MG/1
1 TABLET ORAL DAILY PRN
Qty: 30 TABLET | Refills: 0 | Status: SHIPPED | OUTPATIENT
Start: 2024-02-01 | End: 2024-03-02

## 2024-02-01 RX ORDER — TRIAMCINOLONE ACETONIDE 5 MG/G
CREAM TOPICAL
Qty: 15 G | Refills: 1 | Status: SHIPPED | OUTPATIENT
Start: 2024-02-01

## 2024-02-01 ASSESSMENT — PATIENT HEALTH QUESTIONNAIRE - PHQ9
SUM OF ALL RESPONSES TO PHQ QUESTIONS 1-9: 0
SUM OF ALL RESPONSES TO PHQ QUESTIONS 1-9: 0
1. LITTLE INTEREST OR PLEASURE IN DOING THINGS: 0
SUM OF ALL RESPONSES TO PHQ QUESTIONS 1-9: 0
SUM OF ALL RESPONSES TO PHQ QUESTIONS 1-9: 0
SUM OF ALL RESPONSES TO PHQ9 QUESTIONS 1 & 2: 0
2. FEELING DOWN, DEPRESSED OR HOPELESS: 0

## 2024-02-01 ASSESSMENT — ENCOUNTER SYMPTOMS
WHEEZING: 0
SHORTNESS OF BREATH: 0
DIARRHEA: 0
VOMITING: 0
BACK PAIN: 0
NAUSEA: 0
ABDOMINAL PAIN: 0
COUGH: 0
CONSTIPATION: 0

## 2024-02-01 NOTE — PROGRESS NOTES
24  Félix White : 1950 Sex: male  Age: 73 y.o.    Chief Complaint   Patient presents with    Pre-op Exam     HPI:  73 y.o. male presents today for pre op exam.  Patient's chart, medical, surgical and medication history all reviewed.    Pre-Operative Risk assessment using 2014 ACC/AHA guidelines   Procedure: R [place]   Date of procedure:  24  Surgeon: Dr. Raya  Emergent procedure No  Active Cardiac Condition No (decompensated HF, Arrhythmia, MI <3 weeks, severe valve disease)  Risk Level of Procedure Intermediate Risk (intraperitoneal, intrathoracic, HENT, orthopedic, or carotid endarterectomy, etc.)  Revised Cardiac Risk Index Risk factors: None  Measurement of Exercise Tolerance before Surgery >4 Yes    According to the 2014 ACC/AHA pre-operative risk assessment guidelines Félix White is a low risk for major cardiac complications during a intermediate risk procedure and may continue as planned. Specific medication recommendations are listed below. Medications recommended to continue should be taken with a sip of water even when NPO.     According to [place] perioperative risk calculation, there is a 0.18% risk of myocardial infarction or cardiac arrest, intraoperatively or up to 30 days post-op.    Further recommendations from consultants: None    Medication Recommendations:  ACEI/ARB Continue the day of surgery  Calcium Channel Blocker should be continued the day of surgery   Diuretics HOLD the morning dose on the day of surgery    ROS:  Review of Systems   Constitutional:  Negative for chills, fatigue and fever.   HENT:  Positive for postnasal drip.    Respiratory:  Negative for cough, shortness of breath and wheezing.    Cardiovascular:  Negative for chest pain and palpitations.   Gastrointestinal:  Negative for abdominal pain, constipation, diarrhea, nausea and vomiting.   Musculoskeletal:  Positive for arthralgias and gait problem. Negative for back pain.   Skin:  Positive for rash.

## 2024-02-03 LAB
CULTURE: NORMAL
SPECIMEN DESCRIPTION: NORMAL

## 2024-02-11 DIAGNOSIS — I10 ESSENTIAL HYPERTENSION: ICD-10-CM

## 2024-02-12 RX ORDER — LISINOPRIL AND HYDROCHLOROTHIAZIDE 20; 12.5 MG/1; MG/1
1 TABLET ORAL DAILY
Qty: 90 TABLET | Refills: 1 | Status: SHIPPED | OUTPATIENT
Start: 2024-02-12

## 2024-02-29 DIAGNOSIS — E03.9 ACQUIRED HYPOTHYROIDISM: ICD-10-CM

## 2024-02-29 RX ORDER — LEVOTHYROXINE SODIUM 0.03 MG/1
25 TABLET ORAL DAILY
Qty: 90 TABLET | Refills: 1 | Status: SHIPPED | OUTPATIENT
Start: 2024-02-29

## 2024-04-22 ENCOUNTER — TELEPHONE (OUTPATIENT)
Dept: PRIMARY CARE CLINIC | Age: 74
End: 2024-04-22

## 2024-04-22 LAB
BILIRUBIN URINE: NEGATIVE
COLOR: YELLOW
COMMENT: NORMAL
GLUCOSE URINE: NEGATIVE MG/DL
KETONES, URINE: NEGATIVE MG/DL
LEUKOCYTE ESTERASE, URINE: NEGATIVE
NITRITE, URINE: NEGATIVE
PH UA: 6 (ref 5–9)
PROTEIN UA: NEGATIVE MG/DL
SPECIFIC GRAVITY UA: 1.02 (ref 1–1.03)
TURBIDITY: CLEAR
URINE HGB: NEGATIVE
UROBILINOGEN, URINE: 0.2 EU/DL (ref 0–1)

## 2024-04-22 NOTE — TELEPHONE ENCOUNTER
Patient calling for post op follow up appt. 2/28 he had right total hip. Dr. Raya advised to follow up with you post operatively.

## 2024-04-23 NOTE — TELEPHONE ENCOUNTER
If he isn't having any issues, I would recommend AWV after 7/25.  If he has issues since surgery that he would like to discuss, I can find a sooner spot

## 2024-04-25 LAB
CULTURE: ABNORMAL
Lab: ABNORMAL
SPECIMEN DESCRIPTION: ABNORMAL

## 2024-05-21 ENCOUNTER — OFFICE VISIT (OUTPATIENT)
Dept: ENT CLINIC | Age: 74
End: 2024-05-21
Payer: MEDICARE

## 2024-05-21 VITALS
DIASTOLIC BLOOD PRESSURE: 68 MMHG | SYSTOLIC BLOOD PRESSURE: 131 MMHG | BODY MASS INDEX: 32.55 KG/M2 | HEART RATE: 59 BPM | WEIGHT: 240 LBS

## 2024-05-21 DIAGNOSIS — H61.23 BILATERAL IMPACTED CERUMEN: Primary | ICD-10-CM

## 2024-05-21 PROCEDURE — 3017F COLORECTAL CA SCREEN DOC REV: CPT | Performed by: OTOLARYNGOLOGY

## 2024-05-21 PROCEDURE — 1036F TOBACCO NON-USER: CPT | Performed by: OTOLARYNGOLOGY

## 2024-05-21 PROCEDURE — 69210 REMOVE IMPACTED EAR WAX UNI: CPT | Performed by: OTOLARYNGOLOGY

## 2024-05-21 PROCEDURE — G8417 CALC BMI ABV UP PARAM F/U: HCPCS | Performed by: OTOLARYNGOLOGY

## 2024-05-21 PROCEDURE — 3078F DIAST BP <80 MM HG: CPT | Performed by: OTOLARYNGOLOGY

## 2024-05-21 PROCEDURE — G8427 DOCREV CUR MEDS BY ELIG CLIN: HCPCS | Performed by: OTOLARYNGOLOGY

## 2024-05-21 PROCEDURE — 1123F ACP DISCUSS/DSCN MKR DOCD: CPT | Performed by: OTOLARYNGOLOGY

## 2024-05-21 PROCEDURE — 99213 OFFICE O/P EST LOW 20 MIN: CPT | Performed by: OTOLARYNGOLOGY

## 2024-05-21 PROCEDURE — 3075F SYST BP GE 130 - 139MM HG: CPT | Performed by: OTOLARYNGOLOGY

## 2024-05-21 NOTE — PROGRESS NOTES
[facility]  Dr. Jennings. JANIYA Frost. Ms.Ed        Patient Name:  Félix White  :  1950     CHIEF C/O:    Chief Complaint   Patient presents with    Follow-up     Cerumen removal        HISTORY OBTAINED FROM:  patient    HISTORY OF PRESENT ILLNESS:       Félix is a 74 y.o. year old male, here today for follow up of known history of hearing loss, eustachian tube dysfunction status post tympanostomy tube placement.  Patient is also here for routine ear cleaning. Patient's been doing well, and stable without any associated ear pain drainage or new hearing loss. No fever chills shortness of breath stridor nausea vomit chest pain at this time.    24: Pt following up for cerumen impaction with known history of hearing loss and tympanostomy tube placement. No recent changes or complaints.       Past Medical History:   Diagnosis Date    BPH (benign prostatic hyperplasia)     Colon polyp 2018    Dr. Christine WEINER (degenerative joint disease) of knee     Hypertension     Prostate cancer (HCC) 2023    Spinal stenosis, lumbar      Past Surgical History:   Procedure Laterality Date    CERVICAL LAMINECTOMY  1989    LUMBAR LAMINECTOMY  2010    MYRINGOTOMY Right 2016    Nasal endoscopy with biopsy,biopsy post nasal pharynx    MYRINGOTOMY Right 2019    with right eustachian tube balloon dilation    MYRINGOTOMY AND TYMPANOSTOMY TUBE PLACEMENT Left 2016    Left myringotomy with T-tube insertion    MYRINGOTOMY AND TYMPANOSTOMY TUBE PLACEMENT Left 03/15/2018    Left myringotomy with tympanostomy tube placement     MYRINGOTOMY AND TYMPANOSTOMY TUBE PLACEMENT Right 2019    RIGHT MYRINGOTOMY TUBE REPLACEMENT. RIGHT EUSTACHIAN TUBE DILATION performed by Glen Frost DO at [facility] OR    NASAL ENDOSCOPY Left 03/15/2018    Nasal endoscopy with eustachian tube balloon dilation, left.    IA OFFICE/OUTPT VISIT,PROCEDURE ONLY Left 3/15/2018    LEFT EUSTACHIAN TUBE BALLOON DILATION

## 2024-07-31 ENCOUNTER — OFFICE VISIT (OUTPATIENT)
Dept: PRIMARY CARE CLINIC | Age: 74
End: 2024-07-31

## 2024-07-31 VITALS
OXYGEN SATURATION: 99 % | DIASTOLIC BLOOD PRESSURE: 64 MMHG | HEART RATE: 69 BPM | WEIGHT: 240 LBS | SYSTOLIC BLOOD PRESSURE: 130 MMHG | TEMPERATURE: 98.6 F | BODY MASS INDEX: 32.51 KG/M2 | HEIGHT: 72 IN

## 2024-07-31 DIAGNOSIS — E55.9 VITAMIN D INSUFFICIENCY: ICD-10-CM

## 2024-07-31 DIAGNOSIS — R73.01 IMPAIRED FASTING GLUCOSE: ICD-10-CM

## 2024-07-31 DIAGNOSIS — Z00.00 MEDICARE ANNUAL WELLNESS VISIT, SUBSEQUENT: Primary | ICD-10-CM

## 2024-07-31 DIAGNOSIS — E03.9 ACQUIRED HYPOTHYROIDISM: ICD-10-CM

## 2024-07-31 DIAGNOSIS — I10 ESSENTIAL HYPERTENSION: ICD-10-CM

## 2024-07-31 LAB
ALBUMIN: 4.2 G/DL (ref 3.5–5.2)
ALP BLD-CCNC: 56 U/L (ref 40–129)
ALT SERPL-CCNC: 5 U/L (ref 0–40)
ANION GAP SERPL CALCULATED.3IONS-SCNC: 17 MMOL/L (ref 7–16)
AST SERPL-CCNC: 17 U/L (ref 0–39)
BASOPHILS ABSOLUTE: 0.09 K/UL (ref 0–0.2)
BASOPHILS RELATIVE PERCENT: 1 % (ref 0–2)
BILIRUB SERPL-MCNC: 1.1 MG/DL (ref 0–1.2)
BILIRUBIN, URINE: NEGATIVE
BUN BLDV-MCNC: 16 MG/DL (ref 6–23)
CALCIUM SERPL-MCNC: 9.2 MG/DL (ref 8.6–10.2)
CHLORIDE BLD-SCNC: 100 MMOL/L (ref 98–107)
CHOLESTEROL, TOTAL: 158 MG/DL
CO2: 22 MMOL/L (ref 22–29)
COLOR, UA: YELLOW
COMMENT: NORMAL
CREAT SERPL-MCNC: 1.1 MG/DL (ref 0.7–1.2)
EOSINOPHILS ABSOLUTE: 0.21 K/UL (ref 0.05–0.5)
EOSINOPHILS RELATIVE PERCENT: 2 % (ref 0–6)
GFR, ESTIMATED: 71 ML/MIN/1.73M2
GLUCOSE BLD-MCNC: 89 MG/DL (ref 74–99)
GLUCOSE URINE: NEGATIVE MG/DL
HCT VFR BLD CALC: 43.4 % (ref 37–54)
HDLC SERPL-MCNC: 44 MG/DL
HEMOGLOBIN: 13.8 G/DL (ref 12.5–16.5)
IMMATURE GRANULOCYTES %: 1 % (ref 0–5)
IMMATURE GRANULOCYTES ABSOLUTE: 0.07 K/UL (ref 0–0.58)
KETONES, URINE: NEGATIVE MG/DL
LDL CHOLESTEROL: 97 MG/DL
LEUKOCYTE ESTERASE, URINE: NEGATIVE
LYMPHOCYTES ABSOLUTE: 1.26 K/UL (ref 1.5–4)
LYMPHOCYTES RELATIVE PERCENT: 13 % (ref 20–42)
MCH RBC QN AUTO: 29.6 PG (ref 26–35)
MCHC RBC AUTO-ENTMCNC: 31.8 G/DL (ref 32–34.5)
MCV RBC AUTO: 93.1 FL (ref 80–99.9)
MONOCYTES ABSOLUTE: 0.6 K/UL (ref 0.1–0.95)
MONOCYTES RELATIVE PERCENT: 6 % (ref 2–12)
NEUTROPHILS ABSOLUTE: 7.37 K/UL (ref 1.8–7.3)
NEUTROPHILS RELATIVE PERCENT: 77 % (ref 43–80)
NITRITE, URINE: NEGATIVE
PDW BLD-RTO: 14.9 % (ref 11.5–15)
PH, URINE: 6 (ref 5–9)
PLATELET # BLD: 224 K/UL (ref 130–450)
PMV BLD AUTO: 9.8 FL (ref 7–12)
POTASSIUM SERPL-SCNC: 4 MMOL/L (ref 3.5–5)
PROTEIN UA: NEGATIVE MG/DL
RBC # BLD: 4.66 M/UL (ref 3.8–5.8)
SODIUM BLD-SCNC: 139 MMOL/L (ref 132–146)
SPECIFIC GRAVITY UA: 1.02 (ref 1–1.03)
T4 FREE: 1.1 NG/DL (ref 0.9–1.7)
TOTAL PROTEIN: 7.2 G/DL (ref 6.4–8.3)
TRIGL SERPL-MCNC: 83 MG/DL
TSH SERPL DL<=0.05 MIU/L-ACNC: 2.35 UIU/ML (ref 0.27–4.2)
TURBIDITY: CLEAR
URIC ACID: 6.4 MG/DL (ref 3.4–7)
URINE HGB: NEGATIVE
UROBILINOGEN, URINE: 0.2 EU/DL (ref 0–1)
VITAMIN D 25-HYDROXY: 53.6 NG/ML (ref 30–100)
VLDLC SERPL CALC-MCNC: 17 MG/DL
WBC # BLD: 9.6 K/UL (ref 4.5–11.5)

## 2024-07-31 RX ORDER — LEVOTHYROXINE SODIUM 0.03 MG/1
25 TABLET ORAL DAILY
Qty: 90 TABLET | Refills: 1 | Status: SHIPPED | OUTPATIENT
Start: 2024-07-31

## 2024-07-31 RX ORDER — VERAPAMIL HYDROCHLORIDE 240 MG/1
240 CAPSULE, EXTENDED RELEASE ORAL NIGHTLY
Qty: 90 CAPSULE | Refills: 1 | Status: SHIPPED | OUTPATIENT
Start: 2024-07-31

## 2024-07-31 RX ORDER — LISINOPRIL AND HYDROCHLOROTHIAZIDE 20; 12.5 MG/1; MG/1
1 TABLET ORAL DAILY
Qty: 90 TABLET | Refills: 1 | Status: SHIPPED | OUTPATIENT
Start: 2024-07-31

## 2024-07-31 RX ORDER — CLONIDINE HYDROCHLORIDE 0.1 MG/1
0.2 TABLET ORAL EVERY EVENING
Qty: 180 TABLET | Refills: 1 | Status: SHIPPED | OUTPATIENT
Start: 2024-07-31

## 2024-07-31 SDOH — ECONOMIC STABILITY: FOOD INSECURITY: WITHIN THE PAST 12 MONTHS, THE FOOD YOU BOUGHT JUST DIDN'T LAST AND YOU DIDN'T HAVE MONEY TO GET MORE.: NEVER TRUE

## 2024-07-31 SDOH — ECONOMIC STABILITY: INCOME INSECURITY: HOW HARD IS IT FOR YOU TO PAY FOR THE VERY BASICS LIKE FOOD, HOUSING, MEDICAL CARE, AND HEATING?: NOT HARD AT ALL

## 2024-07-31 SDOH — ECONOMIC STABILITY: FOOD INSECURITY: WITHIN THE PAST 12 MONTHS, YOU WORRIED THAT YOUR FOOD WOULD RUN OUT BEFORE YOU GOT MONEY TO BUY MORE.: NEVER TRUE

## 2024-07-31 ASSESSMENT — LIFESTYLE VARIABLES
HOW OFTEN DO YOU HAVE A DRINK CONTAINING ALCOHOL: NEVER
HOW MANY STANDARD DRINKS CONTAINING ALCOHOL DO YOU HAVE ON A TYPICAL DAY: PATIENT DOES NOT DRINK

## 2024-07-31 ASSESSMENT — PATIENT HEALTH QUESTIONNAIRE - PHQ9
SUM OF ALL RESPONSES TO PHQ QUESTIONS 1-9: 0
SUM OF ALL RESPONSES TO PHQ QUESTIONS 1-9: 0
1. LITTLE INTEREST OR PLEASURE IN DOING THINGS: NOT AT ALL
SUM OF ALL RESPONSES TO PHQ QUESTIONS 1-9: 0
2. FEELING DOWN, DEPRESSED OR HOPELESS: NOT AT ALL
SUM OF ALL RESPONSES TO PHQ9 QUESTIONS 1 & 2: 0
SUM OF ALL RESPONSES TO PHQ QUESTIONS 1-9: 0

## 2024-07-31 NOTE — PROGRESS NOTES
Medicare Annual Wellness Visit    Félix White is here for Medicare AWV    Assessment & Plan   Medicare annual wellness visit, subsequent  HRA reviewed and addressed.  UTD on HM.  Due for fasting labs    Essential hypertension  -     cloNIDine (CATAPRES) 0.1 MG tablet; Take 2 tablets by mouth every evening, Disp-180 tablet, R-1Normal  -     lisinopril-hydroCHLOROthiazide (PRINZIDE;ZESTORETIC) 20-12.5 MG per tablet; Take 1 tablet by mouth daily, Disp-90 tablet, R-1Normal  -     verapamil (VERELAN) 240 MG extended release capsule; Take 1 capsule by mouth nightly, Disp-90 capsule, R-1Normal  -     CBC with Auto Differential; Future  -     Comprehensive Metabolic Panel; Future  -     Lipid Panel; Future  -     TSH; Future  -     Urinalysis; Future  -     Uric Acid; Future    Acquired hypothyroidism  -     levothyroxine (SYNTHROID) 25 MCG tablet; Take 1 tablet by mouth Daily, Disp-90 tablet, R-1Normal  -     CBC with Auto Differential; Future  -     Comprehensive Metabolic Panel; Future  -     Lipid Panel; Future  -     TSH; Future  -     Urinalysis; Future  -     Uric Acid; Future  -     T4, Free; Future    Impaired fasting glucose  -     Hemoglobin A1C; Future    Vitamin D insufficiency  -     Vitamin D 25 Hydroxy; Future    Recommendations for Preventive Services Due: see orders and patient instructions/AVS.  Recommended screening schedule for the next 5-10 years is provided to the patient in written form: see Patient Instructions/AVS.     Return in about 6 months (around 1/31/2025), or if symptoms worsen or fail to improve, for Chronic medical conditions.     Subjective   The following acute and/or chronic problems were also addressed today:    No concerns.  Doing well overall.     Patient's complete Health Risk Assessment and screening values have been reviewed and are found in Flowsheets. The following problems were reviewed today and where indicated follow up appointments were made and/or referrals

## 2024-07-31 NOTE — PATIENT INSTRUCTIONS
Eating Healthy Foods: Care Instructions  With every meal, you can make healthy food choices. Try to eat a variety of fruits, vegetables, whole grains, lean proteins, and low-fat dairy products. This can help you get the right balance of nutrients, including vitamins and minerals. Small changes add up over time. You can start by adding one healthy food to your meals each day.    Try to make half your plate fruits and vegetables, one-fourth whole grains, and one-fourth lean proteins. Try including dairy with your meals.   Eat more fruits and vegetables. Try to have them with most meals and snacks.   Foods for healthy eating        Fruits   These can be fresh, frozen, canned, or dried.  Try to choose whole fruit rather than fruit juice.  Eat a variety of colors.        Vegetables   These can be fresh, frozen, canned, or dried.  Beans, peas, and lentils count too.        Whole grains   Choose whole-grain breads, cereals, and noodles.  Try brown rice.        Lean proteins   These can include lean meat, poultry, fish, and eggs.  You can also have tofu, beans, peas, lentils, nuts, and seeds.        Dairy   Try milk, yogurt, and cheese.  Choose low-fat or fat-free when you can.  If you need to, use lactose-free milk or fortified plant-based milk products, such as soy milk.        Water   Drink water when you're thirsty.  Limit sugar-sweetened drinks, including soda, fruit drinks, and sports drinks.  Where can you learn more?  Go to https://www.[employer].net/patientEd and enter T756 to learn more about \"Eating Healthy Foods: Care Instructions.\"  Current as of: September 20, 2023  Content Version: 14.1  © 8449-6873 [employer].   Care instructions adapted under license by [employer]. If you have questions about a medical condition or this instruction, always ask your healthcare professional. [employer] disclaims any warranty or liability for your use of this information.           Starting a

## 2024-08-01 LAB — HBA1C MFR BLD: 6.1 % (ref 4–5.6)

## 2024-09-03 LAB
BILIRUB UR QL STRIP: NEGATIVE
CLARITY UR: CLEAR
COLOR UR: YELLOW
COMMENT: ABNORMAL
GLUCOSE UR STRIP-MCNC: NEGATIVE MG/DL
HGB UR QL STRIP.AUTO: NEGATIVE
KETONES UR STRIP-MCNC: NEGATIVE MG/DL
LEUKOCYTE ESTERASE UR QL STRIP: NEGATIVE
NITRITE UR QL STRIP: NEGATIVE
PH UR STRIP: 5.5 [PH] (ref 5–9)
PROT UR STRIP-MCNC: NEGATIVE MG/DL
SP GR UR STRIP: >1.03 (ref 1–1.03)
UROBILINOGEN UR STRIP-ACNC: 0.2 EU/DL (ref 0–1)

## 2024-09-04 LAB
MICROORGANISM SPEC CULT: NO GROWTH
SPECIMEN DESCRIPTION: NORMAL

## 2024-09-12 ENCOUNTER — HOSPITAL ENCOUNTER (OUTPATIENT)
Age: 74
Discharge: HOME | End: 2024-09-14

## 2024-09-18 LAB — SURGICAL PATHOLOGY REPORT: NORMAL

## 2024-09-24 ENCOUNTER — OFFICE VISIT (OUTPATIENT)
Dept: ENT CLINIC | Age: 74
End: 2024-09-24
Payer: MEDICARE

## 2024-09-24 VITALS
DIASTOLIC BLOOD PRESSURE: 78 MMHG | WEIGHT: 238 LBS | HEIGHT: 72 IN | SYSTOLIC BLOOD PRESSURE: 129 MMHG | BODY MASS INDEX: 32.23 KG/M2 | HEART RATE: 75 BPM

## 2024-09-24 DIAGNOSIS — H65.491 COME (CHRONIC OTITIS MEDIA WITH EFFUSION), RIGHT: ICD-10-CM

## 2024-09-24 DIAGNOSIS — H69.93 ETD (EUSTACHIAN TUBE DYSFUNCTION), BILATERAL: ICD-10-CM

## 2024-09-24 DIAGNOSIS — H61.23 BILATERAL IMPACTED CERUMEN: Primary | ICD-10-CM

## 2024-09-24 DIAGNOSIS — H72.01 TYMPANIC MEMBRANE CENTRAL PERFORATION, RIGHT: ICD-10-CM

## 2024-09-24 PROCEDURE — 3078F DIAST BP <80 MM HG: CPT | Performed by: OTOLARYNGOLOGY

## 2024-09-24 PROCEDURE — 3074F SYST BP LT 130 MM HG: CPT | Performed by: OTOLARYNGOLOGY

## 2024-09-24 PROCEDURE — 69210 REMOVE IMPACTED EAR WAX UNI: CPT | Performed by: OTOLARYNGOLOGY

## 2024-09-24 PROCEDURE — 99213 OFFICE O/P EST LOW 20 MIN: CPT | Performed by: OTOLARYNGOLOGY

## 2024-09-24 PROCEDURE — 1123F ACP DISCUSS/DSCN MKR DOCD: CPT | Performed by: OTOLARYNGOLOGY

## 2024-09-24 PROCEDURE — G8427 DOCREV CUR MEDS BY ELIG CLIN: HCPCS | Performed by: OTOLARYNGOLOGY

## 2024-09-24 PROCEDURE — G8417 CALC BMI ABV UP PARAM F/U: HCPCS | Performed by: OTOLARYNGOLOGY

## 2024-09-24 PROCEDURE — 1036F TOBACCO NON-USER: CPT | Performed by: OTOLARYNGOLOGY

## 2024-09-24 PROCEDURE — 3017F COLORECTAL CA SCREEN DOC REV: CPT | Performed by: OTOLARYNGOLOGY

## 2024-09-24 NOTE — PROGRESS NOTES
[facility]  Dr. Jennings. JANIYA Frost. Ms.Ed        Patient Name:  Félix White  :  1950     CHIEF C/O:    Chief Complaint   Patient presents with    Follow-up     4 mo cerumen         HISTORY OBTAINED FROM:  patient    HISTORY OF PRESENT ILLNESS:       Félix is a 74 y.o. year old male, here today for follow up of known history of hearing loss, eustachian tube dysfunction status post tympanostomy tube placement.  Patient is also here for routine ear cleaning. Patient's been doing well, and stable without any associated ear pain drainage or new hearing loss. No fever chills shortness of breath stridor nausea vomit chest pain at this time.    24: Pt following up for cerumen impaction with known history of hearing loss and tympanostomy tube placement. No recent changes or complaints.       Past Medical History:   Diagnosis Date    BPH (benign prostatic hyperplasia)     Colon polyp 2018    Dr. Christine WEINER (degenerative joint disease) of knee     Hypertension     Prostate cancer (HCC) 2023    Spinal stenosis, lumbar      Past Surgical History:   Procedure Laterality Date    CATARACT REMOVAL Bilateral     CERVICAL LAMINECTOMY  1989    LUMBAR LAMINECTOMY  2010    MYRINGOTOMY Right 2016    Nasal endoscopy with biopsy,biopsy post nasal pharynx    MYRINGOTOMY Right 2019    with right eustachian tube balloon dilation    MYRINGOTOMY AND TYMPANOSTOMY TUBE PLACEMENT Left 2016    Left myringotomy with T-tube insertion    MYRINGOTOMY AND TYMPANOSTOMY TUBE PLACEMENT Left 03/15/2018    Left myringotomy with tympanostomy tube placement     MYRINGOTOMY AND TYMPANOSTOMY TUBE PLACEMENT Right 2019    RIGHT MYRINGOTOMY TUBE REPLACEMENT. RIGHT EUSTACHIAN TUBE DILATION performed by Glen Frost DO at [facility] OR    NASAL ENDOSCOPY Left 03/15/2018    Nasal endoscopy with eustachian tube balloon dilation, left.    MD OFFICE/OUTPT VISIT,PROCEDURE ONLY Left 03/15/2018    LEFT

## 2024-10-28 DIAGNOSIS — I10 ESSENTIAL HYPERTENSION: ICD-10-CM

## 2024-10-28 RX ORDER — VERAPAMIL HYDROCHLORIDE 240 MG/1
240 CAPSULE, EXTENDED RELEASE ORAL NIGHTLY
Qty: 90 CAPSULE | Refills: 1 | Status: SHIPPED | OUTPATIENT
Start: 2024-10-28

## 2024-10-28 RX ORDER — LISINOPRIL AND HYDROCHLOROTHIAZIDE 12.5; 2 MG/1; MG/1
1 TABLET ORAL DAILY
Qty: 90 TABLET | Refills: 1 | Status: SHIPPED | OUTPATIENT
Start: 2024-10-28

## 2024-12-13 ENCOUNTER — TELEPHONE (OUTPATIENT)
Dept: PRIMARY CARE CLINIC | Age: 74
End: 2024-12-13

## 2024-12-13 RX ORDER — BACLOFEN 10 MG/1
10 TABLET ORAL
Qty: 30 TABLET | Refills: 0 | Status: SHIPPED | OUTPATIENT
Start: 2024-12-13

## 2024-12-13 RX ORDER — DEXAMETHASONE 0.75 MG/1
0.75 TABLET ORAL 3 TIMES DAILY
Qty: 15 TABLET | Refills: 0 | Status: SHIPPED | OUTPATIENT
Start: 2024-12-13 | End: 2024-12-18

## 2024-12-13 NOTE — TELEPHONE ENCOUNTER
Working in the yard last Sunday an has tingling in right foot, pulsating sensation off/on. Has taken OxyContin to stop it, but doesn't want to continue taking that. Has some lower lumbar pain as well, feels like he pulled something in his lumbar area. Asking for an appointment to see you.  Advil isn't helping, lidocaine isn't helping, nor Aspercreme. Wants to know what he should do until he sees you also?

## 2024-12-13 NOTE — TELEPHONE ENCOUNTER
Unfortunately, I will be out until Tuesday.  I could send steroids and a muscle relaxer for him to try or he can come through [facility].

## 2024-12-19 ENCOUNTER — TELEPHONE (OUTPATIENT)
Dept: PRIMARY CARE CLINIC | Age: 74
End: 2024-12-19

## 2024-12-19 DIAGNOSIS — M54.41 ACUTE RIGHT-SIDED LOW BACK PAIN WITH RIGHT-SIDED SCIATICA: Primary | ICD-10-CM

## 2024-12-19 RX ORDER — DEXAMETHASONE 0.75 MG/1
0.75 TABLET ORAL 3 TIMES DAILY
Qty: 15 TABLET | Refills: 0 | Status: SHIPPED | OUTPATIENT
Start: 2024-12-19 | End: 2024-12-24

## 2024-12-19 NOTE — TELEPHONE ENCOUNTER
Patient calling the tingling in his foot and back went away while on steroid . He finished steroid yesterday the tingling in his foot started back up this morning. His back pain is gone the foot pulsing and tingling is the most bothersome. Wanting to know your recommendations.

## 2024-12-19 NOTE — TELEPHONE ENCOUNTER
I would suggest an xray of the back (I can order if agreeable) and possible repeat course of steroids.

## 2024-12-19 NOTE — TELEPHONE ENCOUNTER
Advised pt. He would like xray ordered and he will come to office today to have done. He would also like steroid sent to pharm

## 2024-12-23 DIAGNOSIS — M48.061 SPINAL STENOSIS, LUMBAR REGION, WITHOUT NEUROGENIC CLAUDICATION: Primary | ICD-10-CM

## 2025-01-22 DIAGNOSIS — I10 ESSENTIAL HYPERTENSION: ICD-10-CM

## 2025-01-22 RX ORDER — CLONIDINE HYDROCHLORIDE 0.1 MG/1
0.2 TABLET ORAL EVERY EVENING
Qty: 180 TABLET | Refills: 1 | OUTPATIENT
Start: 2025-01-22

## 2025-01-22 RX ORDER — CLONIDINE HYDROCHLORIDE 0.1 MG/1
0.2 TABLET ORAL EVERY EVENING
Qty: 180 TABLET | Refills: 1 | Status: SHIPPED | OUTPATIENT
Start: 2025-01-22

## 2025-01-24 ENCOUNTER — OFFICE VISIT (OUTPATIENT)
Dept: ENT CLINIC | Age: 75
End: 2025-01-24
Payer: MEDICARE

## 2025-01-24 VITALS
WEIGHT: 243.9 LBS | BODY MASS INDEX: 33.03 KG/M2 | DIASTOLIC BLOOD PRESSURE: 78 MMHG | HEIGHT: 72 IN | RESPIRATION RATE: 18 BRPM | HEART RATE: 69 BPM | TEMPERATURE: 97.7 F | SYSTOLIC BLOOD PRESSURE: 127 MMHG

## 2025-01-24 DIAGNOSIS — H69.93 ETD (EUSTACHIAN TUBE DYSFUNCTION), BILATERAL: ICD-10-CM

## 2025-01-24 DIAGNOSIS — H61.23 BILATERAL IMPACTED CERUMEN: Primary | ICD-10-CM

## 2025-01-24 PROCEDURE — 69210 REMOVE IMPACTED EAR WAX UNI: CPT | Performed by: OTOLARYNGOLOGY

## 2025-01-24 NOTE — PROGRESS NOTES
[facility]  Dr. Glen Frost, JANIYA. Ms.Ed        Patient Name:  Félix White  :  1950     CHIEF C/O:    Chief Complaint   Patient presents with    Follow-up     Patient here today for 4 month follow up on cerumen. Reports he is doing well, no c/o voiced at this time.         HISTORY OBTAINED FROM:  patient    HISTORY OF PRESENT ILLNESS:       Félix is a 74 y.o. year old male, here today for follow up of:       Patient is here for routine cerumen removal            Past Medical History:   Diagnosis Date    BPH (benign prostatic hyperplasia)     Colon polyp 2018    Dr. Christine WEINER (degenerative joint disease) of knee     Hypertension     Prostate cancer (HCC) 2023    Spinal stenosis, lumbar      Past Surgical History:   Procedure Laterality Date    CATARACT REMOVAL Bilateral     CERVICAL LAMINECTOMY      LUMBAR LAMINECTOMY  2010    MYRINGOTOMY Right 2016    Nasal endoscopy with biopsy,biopsy post nasal pharynx    MYRINGOTOMY Right 2019    with right eustachian tube balloon dilation    MYRINGOTOMY AND TYMPANOSTOMY TUBE PLACEMENT Left 2016    Left myringotomy with T-tube insertion    MYRINGOTOMY AND TYMPANOSTOMY TUBE PLACEMENT Left 03/15/2018    Left myringotomy with tympanostomy tube placement     MYRINGOTOMY AND TYMPANOSTOMY TUBE PLACEMENT Right 2019    RIGHT MYRINGOTOMY TUBE REPLACEMENT. RIGHT EUSTACHIAN TUBE DILATION performed by Glen Frost DO at [facility] OR    NASAL ENDOSCOPY Left 03/15/2018    Nasal endoscopy with eustachian tube balloon dilation, left.    SC OFFICE/OUTPT VISIT,PROCEDURE ONLY Left 03/15/2018    LEFT EUSTACHIAN TUBE BALLOON DILATION AND LEFT MYRINGOTOMY WITH T-TUBE INSERTION performed by Glen Frost DO at [facility] OR    TESTICLE SURGERY  2003    x2; hydrocelectomy    TONSILLECTOMY      TOTAL HIP ARTHROPLASTY Right 2024    Dr. Raya    TOTAL KNEE ARTHROPLASTY Right 2013    JEWELL Padgett MD    TOTAL KNEE

## 2025-01-30 ENCOUNTER — OFFICE VISIT (OUTPATIENT)
Dept: PRIMARY CARE CLINIC | Age: 75
End: 2025-01-30

## 2025-01-30 VITALS
WEIGHT: 243 LBS | TEMPERATURE: 97.3 F | DIASTOLIC BLOOD PRESSURE: 84 MMHG | HEIGHT: 72 IN | SYSTOLIC BLOOD PRESSURE: 122 MMHG | OXYGEN SATURATION: 97 % | BODY MASS INDEX: 32.91 KG/M2 | HEART RATE: 74 BPM

## 2025-01-30 DIAGNOSIS — M48.061 SPINAL STENOSIS, LUMBAR REGION, WITHOUT NEUROGENIC CLAUDICATION: ICD-10-CM

## 2025-01-30 DIAGNOSIS — I10 ESSENTIAL HYPERTENSION: Primary | ICD-10-CM

## 2025-01-30 DIAGNOSIS — R73.01 IMPAIRED FASTING GLUCOSE: ICD-10-CM

## 2025-01-30 DIAGNOSIS — E03.9 ACQUIRED HYPOTHYROIDISM: ICD-10-CM

## 2025-01-30 DIAGNOSIS — E55.9 VITAMIN D INSUFFICIENCY: ICD-10-CM

## 2025-01-30 DIAGNOSIS — I10 ESSENTIAL HYPERTENSION: ICD-10-CM

## 2025-01-30 LAB
ALBUMIN: 4.2 G/DL (ref 3.5–5.2)
ALP BLD-CCNC: 54 U/L (ref 40–129)
ALT SERPL-CCNC: 11 U/L (ref 0–40)
ANION GAP SERPL CALCULATED.3IONS-SCNC: 14 MMOL/L (ref 7–16)
AST SERPL-CCNC: 15 U/L (ref 0–39)
BACTERIA: ABNORMAL
BASOPHILS ABSOLUTE: 0.12 K/UL (ref 0–0.2)
BASOPHILS RELATIVE PERCENT: 1 % (ref 0–2)
BILIRUB SERPL-MCNC: 0.9 MG/DL (ref 0–1.2)
BILIRUBIN, URINE: NEGATIVE
BUN BLDV-MCNC: 17 MG/DL (ref 6–23)
CALCIUM SERPL-MCNC: 9.2 MG/DL (ref 8.6–10.2)
CHLORIDE BLD-SCNC: 103 MMOL/L (ref 98–107)
CHOLESTEROL, TOTAL: 156 MG/DL
CO2: 24 MMOL/L (ref 22–29)
COLOR, UA: YELLOW
CREAT SERPL-MCNC: 1.2 MG/DL (ref 0.7–1.2)
EOSINOPHILS ABSOLUTE: 0.17 K/UL (ref 0.05–0.5)
EOSINOPHILS RELATIVE PERCENT: 2 % (ref 0–6)
GFR, ESTIMATED: 67 ML/MIN/1.73M2
GLUCOSE BLD-MCNC: 105 MG/DL (ref 74–99)
GLUCOSE URINE: NEGATIVE MG/DL
HBA1C MFR BLD: 5.7 % (ref 4–5.6)
HCT VFR BLD CALC: 43.7 % (ref 37–54)
HDLC SERPL-MCNC: 38 MG/DL
HEMOGLOBIN: 14.5 G/DL (ref 12.5–16.5)
IMMATURE GRANULOCYTES %: 1 % (ref 0–5)
IMMATURE GRANULOCYTES ABSOLUTE: 0.06 K/UL (ref 0–0.58)
KETONES, URINE: NEGATIVE MG/DL
LDL CHOLESTEROL: 98 MG/DL
LEUKOCYTE ESTERASE, URINE: NEGATIVE
LYMPHOCYTES ABSOLUTE: 1.32 K/UL (ref 1.5–4)
LYMPHOCYTES RELATIVE PERCENT: 16 % (ref 20–42)
MCH RBC QN AUTO: 31.2 PG (ref 26–35)
MCHC RBC AUTO-ENTMCNC: 33.2 G/DL (ref 32–34.5)
MCV RBC AUTO: 94 FL (ref 80–99.9)
MONOCYTES ABSOLUTE: 0.62 K/UL (ref 0.1–0.95)
MONOCYTES RELATIVE PERCENT: 7 % (ref 2–12)
NEUTROPHILS ABSOLUTE: 6.1 K/UL (ref 1.8–7.3)
NEUTROPHILS RELATIVE PERCENT: 73 % (ref 43–80)
NITRITE, URINE: NEGATIVE
PDW BLD-RTO: 14.4 % (ref 11.5–15)
PH, URINE: 6 (ref 5–8)
PLATELET # BLD: 256 K/UL (ref 130–450)
PMV BLD AUTO: 10.3 FL (ref 7–12)
POTASSIUM SERPL-SCNC: 4.1 MMOL/L (ref 3.5–5)
PROTEIN UA: NEGATIVE MG/DL
RBC # BLD: 4.65 M/UL (ref 3.8–5.8)
RBC UA: ABNORMAL /HPF
SODIUM BLD-SCNC: 141 MMOL/L (ref 132–146)
SPECIFIC GRAVITY UA: 1.02 (ref 1–1.03)
T4 FREE: 1.2 NG/DL (ref 0.9–1.7)
TOTAL PROTEIN: 6.9 G/DL (ref 6.4–8.3)
TRIGL SERPL-MCNC: 99 MG/DL
TSH SERPL DL<=0.05 MIU/L-ACNC: 3.2 UIU/ML (ref 0.27–4.2)
TURBIDITY: CLEAR
URIC ACID: 5.7 MG/DL (ref 3.4–7)
URINE HGB: ABNORMAL
UROBILINOGEN, URINE: 1 EU/DL (ref 0–1)
VITAMIN D 25-HYDROXY: 49.7 NG/ML (ref 30–100)
VLDLC SERPL CALC-MCNC: 20 MG/DL
WBC # BLD: 8.4 K/UL (ref 4.5–11.5)
WBC UA: ABNORMAL /HPF

## 2025-01-30 RX ORDER — LEVOTHYROXINE SODIUM 25 UG/1
25 TABLET ORAL DAILY
Qty: 90 TABLET | Refills: 1 | Status: SHIPPED | OUTPATIENT
Start: 2025-01-30

## 2025-01-30 RX ORDER — GABAPENTIN 100 MG/1
100 CAPSULE ORAL NIGHTLY
Qty: 30 CAPSULE | Refills: 5 | Status: SHIPPED | OUTPATIENT
Start: 2025-01-30 | End: 2025-03-01

## 2025-01-30 SDOH — ECONOMIC STABILITY: FOOD INSECURITY: WITHIN THE PAST 12 MONTHS, YOU WORRIED THAT YOUR FOOD WOULD RUN OUT BEFORE YOU GOT MONEY TO BUY MORE.: NEVER TRUE

## 2025-01-30 SDOH — ECONOMIC STABILITY: FOOD INSECURITY: WITHIN THE PAST 12 MONTHS, THE FOOD YOU BOUGHT JUST DIDN'T LAST AND YOU DIDN'T HAVE MONEY TO GET MORE.: NEVER TRUE

## 2025-01-30 ASSESSMENT — ENCOUNTER SYMPTOMS
WHEEZING: 0
BACK PAIN: 1
NAUSEA: 0
COUGH: 0
CONSTIPATION: 0
ABDOMINAL PAIN: 0
SHORTNESS OF BREATH: 0
VOMITING: 0
DIARRHEA: 0

## 2025-01-30 ASSESSMENT — PATIENT HEALTH QUESTIONNAIRE - PHQ9
2. FEELING DOWN, DEPRESSED OR HOPELESS: NOT AT ALL
SUM OF ALL RESPONSES TO PHQ9 QUESTIONS 1 & 2: 0
SUM OF ALL RESPONSES TO PHQ QUESTIONS 1-9: 0
1. LITTLE INTEREST OR PLEASURE IN DOING THINGS: NOT AT ALL
SUM OF ALL RESPONSES TO PHQ QUESTIONS 1-9: 0

## 2025-01-30 NOTE — ASSESSMENT & PLAN NOTE
Reviewed December xray with patient.  He had some relief with Decadron, but still comes and goes.  Will start Gabapentin.  Completed PT at [facility] but feels like it made symptoms worse.  Will send to PM for opinion.     Orders:    Edith Herrera MD, Pain Medicine, [place]    gabapentin (NEURONTIN) 100 MG capsule; Take 1 capsule by mouth nightly for 30 days. Intended supply: 30 days

## 2025-01-30 NOTE — ASSESSMENT & PLAN NOTE
Chronic, at goal (stable), continue current plan pending work up below    Orders:    CBC with Auto Differential; Future    Comprehensive Metabolic Panel; Future    Lipid Panel; Future    TSH; Future    Urinalysis; Future    Uric Acid; Future

## 2025-01-30 NOTE — ASSESSMENT & PLAN NOTE
Chronic, at goal (stable), continue current plan pending work up below    Orders:    TSH; Future    T4, Free; Future    levothyroxine (SYNTHROID) 25 MCG tablet; Take 1 tablet by mouth Daily

## 2025-01-30 NOTE — PROGRESS NOTES
25  Félix White : 1950 Sex: male  Age: 74 y.o.    Chief Complaint   Patient presents with    Hypertension     HPI:  74 y.o. male patient presents today for 6 month(s) follow up of chronic medical conditions, medication refills and FBW.  Patient's chart, medical, surgical and medication history all reviewed.    Hypertension   The patient presents today for follow up of HTN.  The problem is well controlled. Risk factors for coronary artery disease include Age > 30, male, HTN and elevated cholesterol. Current treatments include clonidine (Catapres), hydrochlorothiazide (HCTZ), lisinopril (Prinivil) and Verapamil. The patient is compliant all of the time.  Lifestyle changes the patient has made include  none .  Today the patient is complaining of none.        Hypothyroidism  Patient presents for routine follow up of Hypothyroidism. Current symptoms: none. Patient denies change in energy level, diarrhea, heat / cold intolerance, nervousness, palpitations and weight changes. Symptoms have been well-controlled. No difficulty swallowing or masses felt.   Lab Results   Component Value Date    TSH 2.35 2024   Patient is currently taking levothryoxine 25 mcg.    Patient had been following with Dr. Umanzor for prostate evaluation.  Had biopsy in  that did show abnormality significant for cancer, but in limited area.  Urology is just monitoring.  Repeat Bx by Dr. Smith in September demonstrated all benign cores.     Back Pain  74 y.o. male presents today with complaint of back pain. The pain started  several  year(s) ago, but worsened in 2024.  The patient denies any trauma or injury.  The patient has tried Decadron, muscle relaxers, NSAIDs, Tylenol at home with minimal relief of their symptoms.  The pain is worse with laying on unaffected side.  He admits numbness, tingling to the L LE, but no weakness to the extremities.  He denies any saddle anesthesia.  No bowel or bladder

## 2025-02-06 ENCOUNTER — OFFICE VISIT (OUTPATIENT)
Age: 75
End: 2025-02-06
Payer: MEDICARE

## 2025-02-06 VITALS
TEMPERATURE: 98.2 F | DIASTOLIC BLOOD PRESSURE: 75 MMHG | SYSTOLIC BLOOD PRESSURE: 137 MMHG | WEIGHT: 243 LBS | RESPIRATION RATE: 16 BRPM | HEART RATE: 67 BPM | OXYGEN SATURATION: 99 % | HEIGHT: 72 IN | BODY MASS INDEX: 32.91 KG/M2

## 2025-02-06 DIAGNOSIS — M51.369 DEGENERATION OF INTERVERTEBRAL DISC OF LUMBAR REGION, UNSPECIFIED WHETHER PAIN PRESENT: ICD-10-CM

## 2025-02-06 DIAGNOSIS — R20.2 PARESTHESIA OF BOTH LOWER EXTREMITIES: ICD-10-CM

## 2025-02-06 DIAGNOSIS — M96.1 POST LAMINECTOMY SYNDROME: Primary | ICD-10-CM

## 2025-02-06 DIAGNOSIS — M43.10 ANTEROLISTHESIS: ICD-10-CM

## 2025-02-06 PROCEDURE — G8417 CALC BMI ABV UP PARAM F/U: HCPCS | Performed by: STUDENT IN AN ORGANIZED HEALTH CARE EDUCATION/TRAINING PROGRAM

## 2025-02-06 PROCEDURE — 3075F SYST BP GE 130 - 139MM HG: CPT | Performed by: STUDENT IN AN ORGANIZED HEALTH CARE EDUCATION/TRAINING PROGRAM

## 2025-02-06 PROCEDURE — 1123F ACP DISCUSS/DSCN MKR DOCD: CPT | Performed by: STUDENT IN AN ORGANIZED HEALTH CARE EDUCATION/TRAINING PROGRAM

## 2025-02-06 PROCEDURE — G8427 DOCREV CUR MEDS BY ELIG CLIN: HCPCS | Performed by: STUDENT IN AN ORGANIZED HEALTH CARE EDUCATION/TRAINING PROGRAM

## 2025-02-06 PROCEDURE — 3078F DIAST BP <80 MM HG: CPT | Performed by: STUDENT IN AN ORGANIZED HEALTH CARE EDUCATION/TRAINING PROGRAM

## 2025-02-06 PROCEDURE — 1036F TOBACCO NON-USER: CPT | Performed by: STUDENT IN AN ORGANIZED HEALTH CARE EDUCATION/TRAINING PROGRAM

## 2025-02-06 PROCEDURE — 99204 OFFICE O/P NEW MOD 45 MIN: CPT | Performed by: STUDENT IN AN ORGANIZED HEALTH CARE EDUCATION/TRAINING PROGRAM

## 2025-02-06 PROCEDURE — 1159F MED LIST DOCD IN RCRD: CPT | Performed by: STUDENT IN AN ORGANIZED HEALTH CARE EDUCATION/TRAINING PROGRAM

## 2025-02-06 PROCEDURE — 1160F RVW MEDS BY RX/DR IN RCRD: CPT | Performed by: STUDENT IN AN ORGANIZED HEALTH CARE EDUCATION/TRAINING PROGRAM

## 2025-02-06 PROCEDURE — 3017F COLORECTAL CA SCREEN DOC REV: CPT | Performed by: STUDENT IN AN ORGANIZED HEALTH CARE EDUCATION/TRAINING PROGRAM

## 2025-02-06 RX ORDER — GABAPENTIN 100 MG/1
100 CAPSULE ORAL 2 TIMES DAILY
Qty: 60 CAPSULE | Refills: 0 | Status: SHIPPED | OUTPATIENT
Start: 2025-02-06 | End: 2025-03-08

## 2025-02-06 NOTE — PROGRESS NOTES
Patient:  Félix White,  1950  Date of Service:  25      Patient presents to [facility] with complaints of both legs pain     Pain:  Location: both legs  Inciting Factor: n/a  Duration: 2 months  Description: intermittent  Quality: tingling .    Level (worst): 6  Radiation:  no  Numbness/Tingling: no  Aggravating factors:. Standing still  Alleviating factors: walking helps    Blood Thinners/Anticoagulation:  no     Herbal Supplements: no    Medications:    NSAID's :   no  Tylenol: No   Patches/Gels:    no  Membrane stabilizers :   Current -  gabapentin (NEURONTIN)  Previous - gabapentin (NEURONTIN)  Opioids :   Current -  no   Previous -   no   Muscle Relaxants:    no  Steroids: no   Benzodiazepines:  no  Anti-depres/Anti-Pscyh: no   Adjuvants or Others : no      Previous treatments:    Physical Therapy : No    Chiropractic treatment: No   TENS Unit: No   Previous Pain Physicians: no   Previous Procedure: no    Do you want someone present when the provider examines you? No    /75   Pulse 67   Temp 98.2 °F (36.8 °C) (Infrared)   Resp 16   Ht 1.829 m (6')   Wt 110.2 kg (243 lb)   SpO2 99%   BMI 32.96 kg/m²     No LMP for male patient.

## 2025-02-06 NOTE — PROGRESS NOTES
[facility] - [facility] Medicine  [address] [place] [address]   Pain Medicine Consult Note    Patient:  Félix White DOB 1950  Date of Service:  25  Requesting Physician:  Lisha Juarez DO  Chief Complaint: Consultation (B/l legs)      HISTORY OF PRESENT ILLNESS:      Mr. Félix White is a 74 y.o. male presented today for evaluation of  leg pain that has been ongoing for the past 3 month    He   has a past medical history of BPH, DJD, HTN, hypothyroidism, Prostate CA.       Blood Thinners/Anticoagulation: no  Herbal Supplements: no  Pertinent Allergies: no  Diabetic: no  Bowel/Bladder Incontinence: no  Relevant Surgeries: Yes    Lumbar laminectomy - [place] -  - L3-5 decompression    Cervical laminectomy - [place] -     R THR   B/L TKR   Relevant Recent procedures: No     Pain:  Location: both legs  Inciting Factor: n/a  Duration: 2 months  Description: intermittent  Quality: tingling .    Level (worst): 6  Radiation:  no  Numbness/Tingling: no  Aggravating factors:. Standing still  Alleviating factors: walking helps     Blood Thinners/Anticoagulation:  no                Herbal Supplements: no       Medications:    NSAID's :  ibuprofen  Tylenol: no   Patches/Gels:   no  Membrane stabilizers :   Current - gabapentin 100mg QHS   Previous - no   Opioids :   Current -  no   Previous -   no   Muscle Relaxants:   baclofen  Steroids: dexamethasone, medrol dose pack  Benzodiazepines:  no  Anti-depres/Anti-Pscyh: no   Adjuvants or Others : no      Previous treatments:    Physical Therapy : yes 2025  Chiropractic treatment: no   TENS Unit: no   \    Current Medications:   baclofen, cloNIDine, gabapentin, levothyroxine, lisinopril-hydroCHLOROthiazide, tamsulosin, and verapamil     Social History:  Work Hx: no   Currently in Litigation: denies     H/O Smoking: denies   H/O alcohol abuse : denies   H/O Illicit drug use : denies    Social History     Substance and Sexual Activity

## 2025-02-26 ENCOUNTER — HOSPITAL ENCOUNTER (OUTPATIENT)
Dept: MRI IMAGING | Age: 75
Discharge: HOME | End: 2025-02-28
Attending: STUDENT IN AN ORGANIZED HEALTH CARE EDUCATION/TRAINING PROGRAM
Payer: MEDICARE

## 2025-02-26 DIAGNOSIS — M51.369 DEGENERATION OF INTERVERTEBRAL DISC OF LUMBAR REGION, UNSPECIFIED WHETHER PAIN PRESENT: ICD-10-CM

## 2025-02-26 DIAGNOSIS — M96.1 POST LAMINECTOMY SYNDROME: ICD-10-CM

## 2025-02-26 DIAGNOSIS — M43.10 ANTEROLISTHESIS: ICD-10-CM

## 2025-02-26 DIAGNOSIS — R20.2 PARESTHESIA OF BOTH LOWER EXTREMITIES: ICD-10-CM

## 2025-02-26 PROCEDURE — 72148 MRI LUMBAR SPINE W/O DYE: CPT

## 2025-02-28 LAB
PROSTATE SPECIFIC ANTIGEN FREE: 2 NG/ML
PROSTATE SPECIFIC ANTIGEN PERCENT FREE: 35 %
PROSTATE SPECIFIC ANTIGEN: 5.7 NG/ML (ref 0–4)

## 2025-03-03 ENCOUNTER — PROCEDURE VISIT (OUTPATIENT)
Dept: AUDIOLOGY | Age: 75
End: 2025-03-03
Payer: MEDICARE

## 2025-03-03 ENCOUNTER — OFFICE VISIT (OUTPATIENT)
Dept: ENT CLINIC | Age: 75
End: 2025-03-03
Payer: MEDICARE

## 2025-03-03 VITALS
WEIGHT: 248 LBS | HEIGHT: 72 IN | DIASTOLIC BLOOD PRESSURE: 85 MMHG | SYSTOLIC BLOOD PRESSURE: 148 MMHG | BODY MASS INDEX: 33.59 KG/M2 | OXYGEN SATURATION: 97 % | TEMPERATURE: 98.9 F | RESPIRATION RATE: 16 BRPM | HEART RATE: 64 BPM

## 2025-03-03 DIAGNOSIS — H93.13 TINNITUS OF BOTH EARS: ICD-10-CM

## 2025-03-03 DIAGNOSIS — H90.A32 MIXED CONDUCTIVE AND SENSORINEURAL HEARING LOSS OF LEFT EAR WITH RESTRICTED HEARING OF RIGHT EAR: Primary | ICD-10-CM

## 2025-03-03 DIAGNOSIS — H72.92 EAR DRUM PERFORATION, LEFT: ICD-10-CM

## 2025-03-03 DIAGNOSIS — H90.A32 MIXED CONDUCTIVE AND SENSORINEURAL HEARING LOSS OF LEFT EAR WITH RESTRICTED HEARING OF RIGHT EAR: ICD-10-CM

## 2025-03-03 DIAGNOSIS — H65.23 CHRONIC SEROUS OTITIS MEDIA, BILATERAL: ICD-10-CM

## 2025-03-03 DIAGNOSIS — J34.2 DEVIATED NASAL SEPTUM: Primary | ICD-10-CM

## 2025-03-03 DIAGNOSIS — H90.A21 SENSORINEURAL HEARING LOSS (SNHL) OF RIGHT EAR WITH RESTRICTED HEARING OF LEFT EAR: ICD-10-CM

## 2025-03-03 DIAGNOSIS — R51.9 ACUTE NONINTRACTABLE HEADACHE, UNSPECIFIED HEADACHE TYPE: ICD-10-CM

## 2025-03-03 PROCEDURE — 1160F RVW MEDS BY RX/DR IN RCRD: CPT | Performed by: NURSE PRACTITIONER

## 2025-03-03 PROCEDURE — 3077F SYST BP >= 140 MM HG: CPT | Performed by: NURSE PRACTITIONER

## 2025-03-03 PROCEDURE — 92557 COMPREHENSIVE HEARING TEST: CPT

## 2025-03-03 PROCEDURE — G8417 CALC BMI ABV UP PARAM F/U: HCPCS | Performed by: NURSE PRACTITIONER

## 2025-03-03 PROCEDURE — 3017F COLORECTAL CA SCREEN DOC REV: CPT | Performed by: NURSE PRACTITIONER

## 2025-03-03 PROCEDURE — 99213 OFFICE O/P EST LOW 20 MIN: CPT | Performed by: NURSE PRACTITIONER

## 2025-03-03 PROCEDURE — 3079F DIAST BP 80-89 MM HG: CPT | Performed by: NURSE PRACTITIONER

## 2025-03-03 PROCEDURE — 1036F TOBACCO NON-USER: CPT | Performed by: NURSE PRACTITIONER

## 2025-03-03 PROCEDURE — G8427 DOCREV CUR MEDS BY ELIG CLIN: HCPCS | Performed by: NURSE PRACTITIONER

## 2025-03-03 PROCEDURE — 1123F ACP DISCUSS/DSCN MKR DOCD: CPT | Performed by: NURSE PRACTITIONER

## 2025-03-03 PROCEDURE — 92567 TYMPANOMETRY: CPT

## 2025-03-03 PROCEDURE — 1159F MED LIST DOCD IN RCRD: CPT | Performed by: NURSE PRACTITIONER

## 2025-03-03 RX ORDER — METHYLPREDNISOLONE 4 MG/1
TABLET ORAL
Qty: 1 KIT | Refills: 0 | Status: SHIPPED | OUTPATIENT
Start: 2025-03-03

## 2025-03-03 RX ORDER — FLUTICASONE PROPIONATE 50 MCG
2 SPRAY, SUSPENSION (ML) NASAL DAILY
Qty: 1 EACH | Refills: 3 | Status: SHIPPED | OUTPATIENT
Start: 2025-03-03 | End: 2025-04-02

## 2025-03-03 ASSESSMENT — ENCOUNTER SYMPTOMS
STRIDOR: 0
EYES NEGATIVE: 1
RESPIRATORY NEGATIVE: 1
RHINORRHEA: 0
SHORTNESS OF BREATH: 0
SINUS PAIN: 0
SINUS PRESSURE: 0

## 2025-03-03 NOTE — PROGRESS NOTES
Nose: Septal deviation (severe right sided septal deviation) and rhinorrhea present. No congestion.      Mouth/Throat:      Lips: Pink.      Mouth: Mucous membranes are moist.      Pharynx: Oropharynx is clear.   Eyes:      Conjunctiva/sclera: Conjunctivae normal.      Pupils: Pupils are equal, round, and reactive to light.   Cardiovascular:      Rate and Rhythm: Normal rate and regular rhythm.      Pulses: Normal pulses.   Pulmonary:      Effort: Pulmonary effort is normal. No respiratory distress.      Breath sounds: No stridor.   Musculoskeletal:         General: Normal range of motion.      Cervical back: Normal range of motion. No rigidity. No muscular tenderness.   Lymphadenopathy:      Cervical: No cervical adenopathy.   Skin:     General: Skin is warm and dry.   Neurological:      General: No focal deficit present.      Mental Status: He is alert and oriented to person, place, and time.   Psychiatric:         Mood and Affect: Mood normal.         Behavior: Behavior normal.         Thought Content: Thought content normal.         Judgment: Judgment normal.             Audiogram and tympanogram reviewed with patient.  Audiogram reveals 25 dB hearing loss in the right ear with 100% discrimination at 60 dB, 35 dB of hearing loss in the left ear with 96% discrimination at 70 dB.  Audiogram is asymmetrical on left. Tympanogram reveals type B - Flat with patent PE tube curve in the right ear, with type B - Flat with perforation curve in the left ear.   IMPRESSION/PLAN:  1. Deviated nasal septum  2. Chronic serous otitis media, bilateral  3. Ear drum perforation, left  4. Sensorineural hearing loss (SNHL) of right ear with restricted hearing of left ear  5. Mixed conductive and sensorineural hearing loss of left ear with restricted hearing of right ear  6. Acute nonintractable headache, unspecified headache type  7. Tinnitus of both ears    Will trial medrol dose pack and flonase nasal spray for new onset of

## 2025-03-03 NOTE — PROGRESS NOTES
This patient was referred for audiometric and tympanometric testing by ROSEANN Avila due to  tinnitus and hearing loss . Patient reported a history of PE tubes.      Audiometry using pure tone air and bone conduction testing revealed a mild sensorineural hearing loss through 3000 Hz sloping to moderately-severe beyond in the right ear and a mild to moderate mixed hearing loss through 4000 Hz precipitously sloping to severe beyond in the left ear. An asymmetric is noted between the ears with the left ear being worse than the right. Reliability was good. Speech reception thresholds were in good agreement with the pure tone averages, bilaterally. Speech discrimination scores were excellent, bilaterally (100% in the right ear, 96% in the left ear).    Tympanometry was attempted, however no seal could be obtained to complete testing in the right ear andrevealed flat tympanograms with large ear canal volumes in the left ear.     The results were reviewed with the patient and ordering provider.     Recommendations for follow up will be made pending physician consult.      Marika Mcgee, CCC-A  Clinical Audiologist  [place] License: [id number]    Electronically signed by Hang Tsai on 3/3/2025 at 2:16 PM

## 2025-03-06 ENCOUNTER — OFFICE VISIT (OUTPATIENT)
Age: 75
End: 2025-03-06
Payer: MEDICARE

## 2025-03-06 VITALS
RESPIRATION RATE: 16 BRPM | WEIGHT: 248 LBS | BODY MASS INDEX: 33.59 KG/M2 | DIASTOLIC BLOOD PRESSURE: 72 MMHG | TEMPERATURE: 97.1 F | HEART RATE: 66 BPM | SYSTOLIC BLOOD PRESSURE: 144 MMHG | HEIGHT: 72 IN | OXYGEN SATURATION: 98 %

## 2025-03-06 DIAGNOSIS — R20.2 PARESTHESIA OF BOTH LOWER EXTREMITIES: ICD-10-CM

## 2025-03-06 DIAGNOSIS — M43.10 ANTEROLISTHESIS: ICD-10-CM

## 2025-03-06 DIAGNOSIS — M96.1 POST LAMINECTOMY SYNDROME: Primary | ICD-10-CM

## 2025-03-06 DIAGNOSIS — M51.369 DEGENERATION OF INTERVERTEBRAL DISC OF LUMBAR REGION, UNSPECIFIED WHETHER PAIN PRESENT: ICD-10-CM

## 2025-03-06 PROCEDURE — G8417 CALC BMI ABV UP PARAM F/U: HCPCS | Performed by: STUDENT IN AN ORGANIZED HEALTH CARE EDUCATION/TRAINING PROGRAM

## 2025-03-06 PROCEDURE — 3078F DIAST BP <80 MM HG: CPT | Performed by: STUDENT IN AN ORGANIZED HEALTH CARE EDUCATION/TRAINING PROGRAM

## 2025-03-06 PROCEDURE — 3077F SYST BP >= 140 MM HG: CPT | Performed by: STUDENT IN AN ORGANIZED HEALTH CARE EDUCATION/TRAINING PROGRAM

## 2025-03-06 PROCEDURE — G8427 DOCREV CUR MEDS BY ELIG CLIN: HCPCS | Performed by: STUDENT IN AN ORGANIZED HEALTH CARE EDUCATION/TRAINING PROGRAM

## 2025-03-06 PROCEDURE — 99214 OFFICE O/P EST MOD 30 MIN: CPT | Performed by: STUDENT IN AN ORGANIZED HEALTH CARE EDUCATION/TRAINING PROGRAM

## 2025-03-06 PROCEDURE — 1159F MED LIST DOCD IN RCRD: CPT | Performed by: STUDENT IN AN ORGANIZED HEALTH CARE EDUCATION/TRAINING PROGRAM

## 2025-03-06 PROCEDURE — 3017F COLORECTAL CA SCREEN DOC REV: CPT | Performed by: STUDENT IN AN ORGANIZED HEALTH CARE EDUCATION/TRAINING PROGRAM

## 2025-03-06 PROCEDURE — 1160F RVW MEDS BY RX/DR IN RCRD: CPT | Performed by: STUDENT IN AN ORGANIZED HEALTH CARE EDUCATION/TRAINING PROGRAM

## 2025-03-06 PROCEDURE — 1036F TOBACCO NON-USER: CPT | Performed by: STUDENT IN AN ORGANIZED HEALTH CARE EDUCATION/TRAINING PROGRAM

## 2025-03-06 PROCEDURE — 1123F ACP DISCUSS/DSCN MKR DOCD: CPT | Performed by: STUDENT IN AN ORGANIZED HEALTH CARE EDUCATION/TRAINING PROGRAM

## 2025-03-06 RX ORDER — GABAPENTIN 100 MG/1
200 CAPSULE ORAL 2 TIMES DAILY
Qty: 120 CAPSULE | Refills: 0 | Status: SHIPPED | OUTPATIENT
Start: 2025-03-06 | End: 2025-04-05

## 2025-03-06 NOTE — PROGRESS NOTES
Félix White presents to the [facility] on 3/6/2025. Félix is complaining of pain lower back. The pain is intermittent. The pain is described as soreness. Pain is rated on his best day at a 1, on his worst day at a 6, and on average at a 3 on the VAS scale. He took his last dose of Neurontin today.     Any procedures since your last visit: No,     Pacemaker or defibrillator: No     He is not on NSAIDS and is not on anticoagulation medications     Do you want someone present when the provider examines you? Meena's blood pressure was elevated today. He was instructed to contact his primary care provider as soon as possible.    Medication Contract and Consent for Opioid Use Documents Filed        No documents found                    BP (!) 144/72   Pulse 66   Temp 97.1 °F (36.2 °C) (Infrared)   Resp 16   Ht 1.829 m (6')   Wt 112.5 kg (248 lb)   SpO2 98%   BMI 33.63 kg/m²      No LMP for male patient.  
result(s) of each unique test   Cat 2:Independent interpretation of tests performed by another physician/other qualified healthcare professional   Cat 3:n/a  MDM Complexity of Management: Moderate - Prescription drug management, Minor surgery decision with Risk factors, Dx/Tx limited by social determinant of health    This classification was based on a combination of time spent as well as medical complexity as per above, which included preparing to see the patient, face-to-face patient care, completing clinical documentation, obtaining and/or reviewing separately obtained history, performing a medically appropriate exam, independently reviewing and interpreting imaging and studies, counseling and educating the patient/family/caregiver and ordering medications, tests, or procedures.        NOTE: The above documentation was prepared using voice recognition software.  Every attempt was made to ensure accuracy but there may be spelling, grammatical, and contextual errors.    Edith Costa MD    Controlled Substances Monitoring:   RX Monitoring Periodic Controlled Substance Monitoring   3/9/2020   3:04 PM No signs of potential drug abuse or diversion identified.       OARRS report reviewed today  CC:     No referring provider defined for this encounter.   Lisha Juarez DO   [address] [phone]

## 2025-04-16 ENCOUNTER — OFFICE VISIT (OUTPATIENT)
Age: 75
End: 2025-04-16
Payer: MEDICARE

## 2025-04-16 VITALS
HEIGHT: 72 IN | BODY MASS INDEX: 33.59 KG/M2 | OXYGEN SATURATION: 96 % | HEART RATE: 63 BPM | WEIGHT: 248 LBS | DIASTOLIC BLOOD PRESSURE: 74 MMHG | SYSTOLIC BLOOD PRESSURE: 146 MMHG | TEMPERATURE: 97.3 F

## 2025-04-16 DIAGNOSIS — M51.369 DEGENERATION OF INTERVERTEBRAL DISC OF LUMBAR REGION, UNSPECIFIED WHETHER PAIN PRESENT: ICD-10-CM

## 2025-04-16 DIAGNOSIS — R20.2 PARESTHESIA OF BOTH LOWER EXTREMITIES: ICD-10-CM

## 2025-04-16 DIAGNOSIS — M54.16 LUMBAR RADICULOPATHY: ICD-10-CM

## 2025-04-16 DIAGNOSIS — M96.1 POST LAMINECTOMY SYNDROME: Primary | ICD-10-CM

## 2025-04-16 PROCEDURE — 1123F ACP DISCUSS/DSCN MKR DOCD: CPT | Performed by: STUDENT IN AN ORGANIZED HEALTH CARE EDUCATION/TRAINING PROGRAM

## 2025-04-16 PROCEDURE — 99214 OFFICE O/P EST MOD 30 MIN: CPT | Performed by: STUDENT IN AN ORGANIZED HEALTH CARE EDUCATION/TRAINING PROGRAM

## 2025-04-16 PROCEDURE — 3078F DIAST BP <80 MM HG: CPT | Performed by: STUDENT IN AN ORGANIZED HEALTH CARE EDUCATION/TRAINING PROGRAM

## 2025-04-16 PROCEDURE — 3077F SYST BP >= 140 MM HG: CPT | Performed by: STUDENT IN AN ORGANIZED HEALTH CARE EDUCATION/TRAINING PROGRAM

## 2025-04-16 PROCEDURE — 1036F TOBACCO NON-USER: CPT | Performed by: STUDENT IN AN ORGANIZED HEALTH CARE EDUCATION/TRAINING PROGRAM

## 2025-04-16 PROCEDURE — 3017F COLORECTAL CA SCREEN DOC REV: CPT | Performed by: STUDENT IN AN ORGANIZED HEALTH CARE EDUCATION/TRAINING PROGRAM

## 2025-04-16 PROCEDURE — 1160F RVW MEDS BY RX/DR IN RCRD: CPT | Performed by: STUDENT IN AN ORGANIZED HEALTH CARE EDUCATION/TRAINING PROGRAM

## 2025-04-16 PROCEDURE — G8417 CALC BMI ABV UP PARAM F/U: HCPCS | Performed by: STUDENT IN AN ORGANIZED HEALTH CARE EDUCATION/TRAINING PROGRAM

## 2025-04-16 PROCEDURE — G8427 DOCREV CUR MEDS BY ELIG CLIN: HCPCS | Performed by: STUDENT IN AN ORGANIZED HEALTH CARE EDUCATION/TRAINING PROGRAM

## 2025-04-16 PROCEDURE — 1159F MED LIST DOCD IN RCRD: CPT | Performed by: STUDENT IN AN ORGANIZED HEALTH CARE EDUCATION/TRAINING PROGRAM

## 2025-04-16 RX ORDER — GABAPENTIN 300 MG/1
300 CAPSULE ORAL 3 TIMES DAILY
Qty: 90 CAPSULE | Refills: 0 | Status: SHIPPED | OUTPATIENT
Start: 2025-04-16 | End: 2025-05-16

## 2025-04-16 RX ORDER — BACLOFEN 10 MG/1
10 TABLET ORAL NIGHTLY PRN
Qty: 30 TABLET | Refills: 0 | Status: SHIPPED | OUTPATIENT
Start: 2025-04-16 | End: 2025-05-16

## 2025-04-16 NOTE — PROGRESS NOTES
Félix White presents to the [facility] on 4/16/2025. Félix is complaining of pain bilateral leg. The pain is intermittent. The pain is described as numb and tingling. Pain is rated on his best day at a 0, on his worst day at a 4, and on average at a 2 on the VAS scale. He took his last dose of Neurontin and Motrin 04/15.      Any procedures since your last visit: No    He is  on NSAIDS and  is not on anticoagulation medications     Pacemaker or defibrillator: No      Do you want someone present when the provider examines you? No    Medication Contract and Consent for Opioid Use Documents Filed        No documents found                       BP (!) 146/74   Pulse 63   Temp 97.3 °F (36.3 °C)   Ht 1.829 m (6')   Wt 112.5 kg (248 lb)   SpO2 96%   BMI 33.63 kg/m²      Félix's blood pressure was elevated today. He was instructed to contact his primary care provider as soon as possible.    No LMP for male patient.  
increase the patient's gabapentin to 300 mg TID as tolerated for his neuropathic symptoms.  Will also refill his baclofen 10 mg QHS PRN for spasms.  We discussed interventional options including TFESI versus caudal MARIA L with Racz catheter.  Patient would like to consider at next office visit.  RBA discussed.  Patient may also be a candidate for SCS in the future.    Treatment plan discussed with the patient including medication and procedure side effects, as well as benefits and alternatives and the patient expressed understanding, agreement and wishes to proceed.        Plan:   Therapy:   Home Exercise Program (HEP)  Imaging/Studies:   No new  Medications:   Gabapentin 300 mg TID for neuropathic pain  Baclofen 10 mg QHS PRN for spasms  Interventions:   Consider Caudal MARIA L w/ Racz vs TFESI  Consider SCS  Consults:   Pending workup  Follow up:   4 weeks       Counseling :  Patient encouraged to stay active and to watch/lose weight   Encouraged to continue Regular home exercise program as tolerated - stretching / strengthening.   Smoking cessation counseling : no   Treatment plan discussed with the patient including medication and procedure side effects, as well as benefits and alternatives and the patient expressed understanding.     E&M Medical Decision Making / Time Spent:  Classification: 03254 - Moderate Medical Decision Making and/or 30-39 min     MDM Complexity of Problems: Moderate - 1 or more chronic illnesses with exacerbation, progression, or side effects of treatment, 2 or more stable chronic illnesses, 1 undiagnosed new problem with uncertain prognosis, 1 acute illness with systemic symptoms, 1 acute complicated injury  MDM Complexity of Data/Analysis:   Cat 1:Review of prior external note(s) from each unique source and Review of the result(s) of each unique test   Cat 2:n/a   Cat 3:n/a  MDM Complexity of Management: Moderate - Prescription drug management, Minor surgery decision with Risk factors, Dx/Tx

## 2025-05-13 NOTE — TELEPHONE ENCOUNTER
Name of Medication(s) Requested:  Requested Prescriptions     Pending Prescriptions Disp Refills    gabapentin (NEURONTIN) 300 MG capsule [Pharmacy Med Name: GABAPENTIN 300MG CAPSULES] 90 capsule 0     Sig: Take 1 capsule by mouth 3 times daily.       Medication is on current medication list Yes    Dosage and directions were verified? Yes    Quantity verified: 90 day supply     Pharmacy Verified?  Yes    Last Appointment:  4/16/2025    Future appts:  Future Appointments   Date Time Provider Department Center   5/14/2025  3:00 PM Edith Costa MD [place] [facility]   5/30/2025  8:30 AM SCHEDULE, [place] [facility]   5/30/2025  8:45 AM Glen Frost DO [place] [facility]   10/1/2025  8:30 AM Lisha Juarez DO [facility] [place] [facility]        (If no appt send self scheduling link. .REFILLAPPT)  Scheduling request sent?     [] Yes  [] No    Does patient need updated?  [] Yes  [] No

## 2025-05-14 ENCOUNTER — OFFICE VISIT (OUTPATIENT)
Age: 75
End: 2025-05-14
Payer: MEDICARE

## 2025-05-14 VITALS
HEIGHT: 72 IN | SYSTOLIC BLOOD PRESSURE: 136 MMHG | BODY MASS INDEX: 33.59 KG/M2 | HEART RATE: 64 BPM | DIASTOLIC BLOOD PRESSURE: 84 MMHG | WEIGHT: 248 LBS | TEMPERATURE: 97.4 F | OXYGEN SATURATION: 97 %

## 2025-05-14 DIAGNOSIS — M96.1 POST LAMINECTOMY SYNDROME: Primary | ICD-10-CM

## 2025-05-14 DIAGNOSIS — M43.10 ANTEROLISTHESIS: ICD-10-CM

## 2025-05-14 DIAGNOSIS — M51.369 DEGENERATION OF INTERVERTEBRAL DISC OF LUMBAR REGION, UNSPECIFIED WHETHER PAIN PRESENT: ICD-10-CM

## 2025-05-14 DIAGNOSIS — M54.16 LUMBAR RADICULOPATHY: ICD-10-CM

## 2025-05-14 DIAGNOSIS — R20.2 PARESTHESIA OF BOTH LOWER EXTREMITIES: ICD-10-CM

## 2025-05-14 PROCEDURE — 3079F DIAST BP 80-89 MM HG: CPT | Performed by: STUDENT IN AN ORGANIZED HEALTH CARE EDUCATION/TRAINING PROGRAM

## 2025-05-14 PROCEDURE — 3075F SYST BP GE 130 - 139MM HG: CPT | Performed by: STUDENT IN AN ORGANIZED HEALTH CARE EDUCATION/TRAINING PROGRAM

## 2025-05-14 PROCEDURE — 1036F TOBACCO NON-USER: CPT | Performed by: STUDENT IN AN ORGANIZED HEALTH CARE EDUCATION/TRAINING PROGRAM

## 2025-05-14 PROCEDURE — 1123F ACP DISCUSS/DSCN MKR DOCD: CPT | Performed by: STUDENT IN AN ORGANIZED HEALTH CARE EDUCATION/TRAINING PROGRAM

## 2025-05-14 PROCEDURE — G8427 DOCREV CUR MEDS BY ELIG CLIN: HCPCS | Performed by: STUDENT IN AN ORGANIZED HEALTH CARE EDUCATION/TRAINING PROGRAM

## 2025-05-14 PROCEDURE — 99214 OFFICE O/P EST MOD 30 MIN: CPT | Performed by: STUDENT IN AN ORGANIZED HEALTH CARE EDUCATION/TRAINING PROGRAM

## 2025-05-14 PROCEDURE — 1159F MED LIST DOCD IN RCRD: CPT | Performed by: STUDENT IN AN ORGANIZED HEALTH CARE EDUCATION/TRAINING PROGRAM

## 2025-05-14 PROCEDURE — 3017F COLORECTAL CA SCREEN DOC REV: CPT | Performed by: STUDENT IN AN ORGANIZED HEALTH CARE EDUCATION/TRAINING PROGRAM

## 2025-05-14 PROCEDURE — 1160F RVW MEDS BY RX/DR IN RCRD: CPT | Performed by: STUDENT IN AN ORGANIZED HEALTH CARE EDUCATION/TRAINING PROGRAM

## 2025-05-14 PROCEDURE — G8417 CALC BMI ABV UP PARAM F/U: HCPCS | Performed by: STUDENT IN AN ORGANIZED HEALTH CARE EDUCATION/TRAINING PROGRAM

## 2025-05-14 RX ORDER — GABAPENTIN 300 MG/1
300 CAPSULE ORAL 3 TIMES DAILY
Qty: 90 CAPSULE | Refills: 0 | OUTPATIENT
Start: 2025-05-14

## 2025-05-14 RX ORDER — PREGABALIN 50 MG/1
50 CAPSULE ORAL 3 TIMES DAILY
Qty: 135 CAPSULE | Refills: 0 | Status: SHIPPED | OUTPATIENT
Start: 2025-05-14 | End: 2025-06-28

## 2025-05-14 NOTE — PROGRESS NOTES
Félix White presents to the [facility] on 5/14/2025. Félix is complaining of pain lower back. The pain is intermittent. The pain is described as numb. Pain is rated on his best day at a 1, on his worst day at a 5, and on average at a 3 on the VAS scale. He took his last dose of Neurontin and Motrin 05/14.    Any procedures since your last visit: No    He is  on NSAIDS and  is not on anticoagulation medications     Pacemaker or defibrillator: No       Do you want someone present when the provider examines you? No    Medication Contract and Consent for Opioid Use Documents Filed        No documents found                       /84   Pulse 64   Temp 97.4 °F (36.3 °C)   Ht 1.829 m (6')   Wt 112.5 kg (248 lb)   SpO2 97%   BMI 33.63 kg/m²      No LMP for male patient.  
pm    TECHNIQUE:  Multiplanar multisequence MRI of the lumbar spine was performed without the  administration of intravenous contrast.    COMPARISON:  None.    HISTORY:  ORDERING SYSTEM PROVIDED HISTORY: Post laminectomy syndrome    FINDINGS:  BONES/ALIGNMENT:  No fracture or joint dislocation. Marrow edema indicative  of Modic type 1 degenerative changes is seen along the endplates posteriorly  at the level of L4-5.  No evidence of an aggressive marrow infiltrative  process.    SPINAL CORD: The conus terminates normally.    SOFT TISSUES: No paraspinal mass identified.    L1-L2: Severe loss of disc height with a small disc bulge.  Mild facet  hypertrophy.  No significant central canal or lateral recess stenosis.  Mild  right and mild-to-moderate left neural foraminal stenoses.    L2-L3: Prominent loss of disc height with a small disc bulge.  Superimposed  central disc protrusion.  Mild facet hypertrophy.  No significant central  canal or lateral recess stenoses.  Mild-to-moderate neural foraminal stenoses.    L3-L4: Severe loss of disc height with a small disc bulge.  Decompressive  laminectomies.  Mild facet hypertrophy.  No significant central canal or  lateral recess stenosis.  Mild right and moderate left neural foraminal  stenoses.    L4-L5: Moderate loss of disc height with small disc bulge.  Decompressive  laminectomies.  Mild facet hypertrophy.  No significant central canal or  lateral recess stenosis.  Moderate to severe neural foraminal stenoses.    L5-S1: Disc desiccation with a small disc bulge.  Decompressive  laminectomies.  Mild to moderate facet hypertrophy.  No significant central  canal or lateral recess stenosis.  Severe right and mild-to-moderate left  neural foraminal stenoses.    Impression  1. No fracture or joint dislocation is noted.  2. Decompressive laminectomies at L3-4, L4-5 and L5-S1.  3. No significant central canal stenosis.  4. Multilevel neural foraminal stenoses, worst (severe) at

## 2025-05-22 ENCOUNTER — TELEPHONE (OUTPATIENT)
Age: 75
End: 2025-05-22

## 2025-05-22 NOTE — TELEPHONE ENCOUNTER
Patient called in stating his insurance company will not cover Lyrica, which was ordered at his last visit. He is wondering if a refill of Gabapentin 300mg TID could be called in as this is what he was taking and will continue taking. He uses [facility] in [place]. Please advise.

## 2025-05-30 ENCOUNTER — PROCEDURE VISIT (OUTPATIENT)
Dept: AUDIOLOGY | Age: 75
End: 2025-05-30

## 2025-05-30 ENCOUNTER — OFFICE VISIT (OUTPATIENT)
Dept: ENT CLINIC | Age: 75
End: 2025-05-30
Payer: MEDICARE

## 2025-05-30 VITALS
SYSTOLIC BLOOD PRESSURE: 133 MMHG | HEART RATE: 69 BPM | DIASTOLIC BLOOD PRESSURE: 84 MMHG | TEMPERATURE: 98 F | WEIGHT: 255.2 LBS | HEIGHT: 72 IN | OXYGEN SATURATION: 94 % | BODY MASS INDEX: 34.57 KG/M2

## 2025-05-30 DIAGNOSIS — H61.23 BILATERAL IMPACTED CERUMEN: Primary | ICD-10-CM

## 2025-05-30 DIAGNOSIS — H90.A32 MIXED CONDUCTIVE AND SENSORINEURAL HEARING LOSS OF LEFT EAR WITH RESTRICTED HEARING OF RIGHT EAR: Primary | ICD-10-CM

## 2025-05-30 DIAGNOSIS — H93.13 TINNITUS OF BOTH EARS: ICD-10-CM

## 2025-05-30 PROCEDURE — G8427 DOCREV CUR MEDS BY ELIG CLIN: HCPCS | Performed by: OTOLARYNGOLOGY

## 2025-05-30 PROCEDURE — 1159F MED LIST DOCD IN RCRD: CPT | Performed by: OTOLARYNGOLOGY

## 2025-05-30 PROCEDURE — 69210 REMOVE IMPACTED EAR WAX UNI: CPT | Performed by: OTOLARYNGOLOGY

## 2025-05-30 PROCEDURE — 1123F ACP DISCUSS/DSCN MKR DOCD: CPT | Performed by: OTOLARYNGOLOGY

## 2025-05-30 PROCEDURE — 1036F TOBACCO NON-USER: CPT | Performed by: OTOLARYNGOLOGY

## 2025-05-30 PROCEDURE — 3075F SYST BP GE 130 - 139MM HG: CPT | Performed by: OTOLARYNGOLOGY

## 2025-05-30 PROCEDURE — 99213 OFFICE O/P EST LOW 20 MIN: CPT | Performed by: OTOLARYNGOLOGY

## 2025-05-30 PROCEDURE — 3079F DIAST BP 80-89 MM HG: CPT | Performed by: OTOLARYNGOLOGY

## 2025-05-30 PROCEDURE — 3017F COLORECTAL CA SCREEN DOC REV: CPT | Performed by: OTOLARYNGOLOGY

## 2025-05-30 PROCEDURE — G8417 CALC BMI ABV UP PARAM F/U: HCPCS | Performed by: OTOLARYNGOLOGY

## 2025-05-30 NOTE — PROGRESS NOTES
Dr. Glen Montalvo CNP  Patient Name:  Félix White  :  1950     CHIEF C/O:    Chief Complaint   Patient presents with    Follow-up     FOLLOW UP - 4 mo f/u cerumen & audio x         HISTORY OBTAINED FROM:  patient    HISTORY OF PRESENT ILLNESS:       Félix is a 75 y.o. year old male, here today for ear eval. Has a h/o tinnitus, however since last Tuesday, he woke up with a high pitched hiss and pressure in both ears. Feels like it is in the center of his head. Denies any drainage out of the ears. Has a h/o tube in the right ear and a perforation in the left ear. No pain in the ears. He is getting some pressure in the left ear. He is not having any sinus symptoms or congestion. He has a headache in the center of his head as well, which has also been a daily issue since this started. No h/o chronic headaches or migraines prior to this. He has not had any recent medication changes. He has been getting some neuropathy recently. He started gabapentin a month ago for neuropathy due to his scoliosis, which has been helping, however this is a new medication for him. He has been blowing his nose a lot as well, however no discolored drainage. He does not feel as if he currently has a sinus infection. Has used flonase in the past for congestion, however he does not have any currently. Has not had a recent hearing test that I can see dating back in his chart. States that he has not had any recent hearing testing anywhere else or here. There is extensive history of tympanogram testing that results are in the chart.     25: Pt here for recheck of cerumen and cleaning and repeat audio. Tinnitus has been stable. No drainage from ears.     Past Medical History:   Diagnosis Date    BPH (benign prostatic hyperplasia)     Colon polyp 2018    Dr. Christine WEINER (degenerative joint disease) of knee     Hypertension     Prostate cancer (HCC) 2023    Spinal

## 2025-05-30 NOTE — PROGRESS NOTES
This patient was referred for audiometric and tympanometric testing by Dr. Frost due to eustachian tube dysfunction and hearing loss.     Pure tone air conduction audiometry revealed hearing sensitivity within normal limits through 2000 Hz, sloping to severe hearing loss, in the right ear. The left ear revealed a mild to severe hearing loss. Reliability was good.     Tympanometry revealed flat tympanograms with large ear canal volumes suggesting patent PE tubes, bilaterally.    The results were reviewed with the patient and ordering provider.     Recommendations for follow up will be made pending ordering provider consult.    Hang Villatoro/CCC-A  [place] Lic [id number]  Electronically signed by Hang Villatoro on 5/30/2025 at 9:26 AM

## 2025-06-09 ENCOUNTER — OFFICE VISIT (OUTPATIENT)
Dept: PAIN MANAGEMENT | Age: 75
End: 2025-06-09
Payer: MEDICARE

## 2025-06-09 VITALS
HEART RATE: 74 BPM | WEIGHT: 255 LBS | TEMPERATURE: 97.9 F | DIASTOLIC BLOOD PRESSURE: 76 MMHG | HEIGHT: 72 IN | SYSTOLIC BLOOD PRESSURE: 130 MMHG | OXYGEN SATURATION: 95 % | BODY MASS INDEX: 34.54 KG/M2

## 2025-06-09 DIAGNOSIS — M54.16 LUMBAR RADICULOPATHY: ICD-10-CM

## 2025-06-09 DIAGNOSIS — M96.1 POST LAMINECTOMY SYNDROME: Primary | ICD-10-CM

## 2025-06-09 DIAGNOSIS — M51.369 DEGENERATION OF INTERVERTEBRAL DISC OF LUMBAR REGION, UNSPECIFIED WHETHER PAIN PRESENT: ICD-10-CM

## 2025-06-09 DIAGNOSIS — R20.2 PARESTHESIA OF BOTH LOWER EXTREMITIES: ICD-10-CM

## 2025-06-09 PROCEDURE — 3075F SYST BP GE 130 - 139MM HG: CPT | Performed by: STUDENT IN AN ORGANIZED HEALTH CARE EDUCATION/TRAINING PROGRAM

## 2025-06-09 PROCEDURE — 99214 OFFICE O/P EST MOD 30 MIN: CPT | Performed by: STUDENT IN AN ORGANIZED HEALTH CARE EDUCATION/TRAINING PROGRAM

## 2025-06-09 PROCEDURE — G8417 CALC BMI ABV UP PARAM F/U: HCPCS | Performed by: STUDENT IN AN ORGANIZED HEALTH CARE EDUCATION/TRAINING PROGRAM

## 2025-06-09 PROCEDURE — 1159F MED LIST DOCD IN RCRD: CPT | Performed by: STUDENT IN AN ORGANIZED HEALTH CARE EDUCATION/TRAINING PROGRAM

## 2025-06-09 PROCEDURE — 3078F DIAST BP <80 MM HG: CPT | Performed by: STUDENT IN AN ORGANIZED HEALTH CARE EDUCATION/TRAINING PROGRAM

## 2025-06-09 PROCEDURE — 1160F RVW MEDS BY RX/DR IN RCRD: CPT | Performed by: STUDENT IN AN ORGANIZED HEALTH CARE EDUCATION/TRAINING PROGRAM

## 2025-06-09 PROCEDURE — G8427 DOCREV CUR MEDS BY ELIG CLIN: HCPCS | Performed by: STUDENT IN AN ORGANIZED HEALTH CARE EDUCATION/TRAINING PROGRAM

## 2025-06-09 PROCEDURE — 1036F TOBACCO NON-USER: CPT | Performed by: STUDENT IN AN ORGANIZED HEALTH CARE EDUCATION/TRAINING PROGRAM

## 2025-06-09 PROCEDURE — 3017F COLORECTAL CA SCREEN DOC REV: CPT | Performed by: STUDENT IN AN ORGANIZED HEALTH CARE EDUCATION/TRAINING PROGRAM

## 2025-06-09 PROCEDURE — 1123F ACP DISCUSS/DSCN MKR DOCD: CPT | Performed by: STUDENT IN AN ORGANIZED HEALTH CARE EDUCATION/TRAINING PROGRAM

## 2025-06-09 RX ORDER — PREGABALIN 50 MG/1
50 CAPSULE ORAL 3 TIMES DAILY
Qty: 270 CAPSULE | Refills: 0 | Status: SHIPPED | OUTPATIENT
Start: 2025-06-09 | End: 2025-09-07

## 2025-06-09 NOTE — PROGRESS NOTES
Former[facility], later [facility] - Pain Medicine  [address] [place] [address]     Pain Medicine Follow Up Note      Félix Mcadamszullypeggy     Date of Visit:  6/9/2025    CC:  Patient presents for follow up   Chief Complaint   Patient presents with    Follow-up     Lower back pain       HPI:    Pain is better.  Medication side effects:none.   Recent interventional procedures:none..  Blood Thinners/Anticoagulation: no  Herbal Supplements: no  Pertinent Allergies: no  Diabetic: no  Bowel/Bladder Incontinence: no    Previous Plan:  HEP - ongoing  Lyrica 50 mg TID - better relief, no s/e   Insurance denied  Used Good Rx - still paid $280    Interval Changes:  Pain improved with lyrica  Very rare leg symptoms   Planning to move to [place]  Considering interventions    Procedures:        Previous Treatments:   Ibuprofen, gabapentin, baclofen, dexamethasone, Medrol Dosepak, PT, surgery .       Potential Aberrant Drug-Related Behavior:  no      Imaging/Studies:      XRAY:  XR LUMBOSACRAL W OBLIQUES AND FLEXION AND EXTENSION 12/19/2024      There is multilevel disc space narrowing in the lumbar spine.  There is suggestion of slight anterior subluxation of L5 with respect S1  which appears to be stable on neutral, flexion and extension.     There is no obvious spondylolysis.  There have been laminectomies in the  lower lumbar spine.  Findings suggest more significant disc space narrowing  at L4-5 and L1-2 as well.     Impression  1. Multilevel degenerative disc disease in the lumbar spine.  2. Slight anterior subluxation of L5 with respect S1 which appears to be  stable on neutral, flexion and extension.        XR PELVIS (1-2 VIEWS) 5/2024     Right hip prosthesis in expected position.    MRI:  MRI LUMBAR SPINE WO CONTRAST 02/26/2025    Narrative  EXAMINATION:  MRI OF THE LUMBAR SPINE WITHOUT CONTRAST, 2/26/2025 1:01 pm    TECHNIQUE:  Multiplanar multisequence MRI of the lumbar spine was performed without the  administration of

## 2025-06-09 NOTE — PROGRESS NOTES
Félix White presents to the [facility] on 6/9/2025. Félix is complaining of pain lower back. The pain is intermittent. The pain is described as aching. Pain is rated on his best day at a 0, on his worst day at a 5, and on average at a 2 on the VAS scale. He took his last dose of Lyrica and Motrin 06/09.    Any procedures since your last visit: No    He is  on NSAIDS and  is not on anticoagulation medications   Pacemaker or defibrillator: No     Do you want someone present when the provider examines you? No    Medication Contract and Consent for Opioid Use Documents Filed        No documents found                       /76   Pulse 74   Temp 97.9 °F (36.6 °C)   Ht 1.829 m (6')   Wt 115.7 kg (255 lb)   SpO2 95%   BMI 34.58 kg/m²      No LMP for male patient.

## 2025-07-23 ENCOUNTER — HOSPITAL ENCOUNTER (EMERGENCY)
Age: 75
Discharge: HOME | End: 2025-07-23
Payer: MEDICARE

## 2025-07-23 VITALS
BODY MASS INDEX: 32.37 KG/M2 | SYSTOLIC BLOOD PRESSURE: 148 MMHG | HEIGHT: 72 IN | HEART RATE: 55 BPM | WEIGHT: 239 LBS | OXYGEN SATURATION: 97 % | DIASTOLIC BLOOD PRESSURE: 81 MMHG | TEMPERATURE: 98.3 F | RESPIRATION RATE: 18 BRPM

## 2025-07-23 DIAGNOSIS — G89.29 ACUTE EXACERBATION OF CHRONIC LOW BACK PAIN: Primary | ICD-10-CM

## 2025-07-23 DIAGNOSIS — M54.50 ACUTE EXACERBATION OF CHRONIC LOW BACK PAIN: Primary | ICD-10-CM

## 2025-07-23 DIAGNOSIS — M48.07 NEURAL FORAMINAL STENOSIS OF LUMBOSACRAL SPINE: ICD-10-CM

## 2025-07-23 DIAGNOSIS — M54.31 SCIATICA OF RIGHT SIDE: ICD-10-CM

## 2025-07-23 PROCEDURE — 6360000002 HC RX W HCPCS: Performed by: NURSE PRACTITIONER

## 2025-07-23 PROCEDURE — 99284 EMERGENCY DEPT VISIT MOD MDM: CPT

## 2025-07-23 PROCEDURE — 6370000000 HC RX 637 (ALT 250 FOR IP): Performed by: NURSE PRACTITIONER

## 2025-07-23 PROCEDURE — 96372 THER/PROPH/DIAG INJ SC/IM: CPT

## 2025-07-23 RX ORDER — METHYLPREDNISOLONE 4 MG/1
TABLET ORAL
Qty: 1 KIT | Refills: 0 | Status: SHIPPED | OUTPATIENT
Start: 2025-07-23

## 2025-07-23 RX ORDER — METHOCARBAMOL 500 MG/1
500 TABLET, FILM COATED ORAL 3 TIMES DAILY PRN
Qty: 36 TABLET | Refills: 0 | Status: SHIPPED | OUTPATIENT
Start: 2025-07-23 | End: 2025-07-23

## 2025-07-23 RX ORDER — METHOCARBAMOL 500 MG/1
500 TABLET, FILM COATED ORAL 3 TIMES DAILY PRN
Qty: 12 TABLET | Refills: 0 | Status: SHIPPED | OUTPATIENT
Start: 2025-07-23

## 2025-07-23 RX ORDER — HYDROCODONE BITARTRATE AND ACETAMINOPHEN 5; 325 MG/1; MG/1
1 TABLET ORAL ONCE
Status: DISCONTINUED | OUTPATIENT
Start: 2025-07-23 | End: 2025-07-23

## 2025-07-23 RX ORDER — LIDOCAINE 4 G/G
1 PATCH TOPICAL DAILY
Status: DISCONTINUED | OUTPATIENT
Start: 2025-07-23 | End: 2025-07-23 | Stop reason: HOSPADM

## 2025-07-23 RX ORDER — LIDOCAINE 50 MG/G
1 PATCH TOPICAL EVERY 24 HOURS
Qty: 10 PATCH | Refills: 0 | Status: SHIPPED | OUTPATIENT
Start: 2025-07-23 | End: 2025-08-02

## 2025-07-23 RX ORDER — DEXAMETHASONE SODIUM PHOSPHATE 10 MG/ML
10 INJECTION, SOLUTION INTRA-ARTICULAR; INTRALESIONAL; INTRAMUSCULAR; INTRAVENOUS; SOFT TISSUE ONCE
Status: COMPLETED | OUTPATIENT
Start: 2025-07-23 | End: 2025-07-23

## 2025-07-23 RX ORDER — KETOROLAC TROMETHAMINE 30 MG/ML
30 INJECTION, SOLUTION INTRAMUSCULAR; INTRAVENOUS ONCE
Status: COMPLETED | OUTPATIENT
Start: 2025-07-23 | End: 2025-07-23

## 2025-07-23 RX ORDER — OXYCODONE AND ACETAMINOPHEN 5; 325 MG/1; MG/1
1 TABLET ORAL ONCE
Refills: 0 | Status: COMPLETED | OUTPATIENT
Start: 2025-07-23 | End: 2025-07-23

## 2025-07-23 RX ORDER — ORPHENADRINE CITRATE 30 MG/ML
60 INJECTION INTRAMUSCULAR; INTRAVENOUS ONCE
Status: COMPLETED | OUTPATIENT
Start: 2025-07-23 | End: 2025-07-23

## 2025-07-23 RX ADMIN — OXYCODONE AND ACETAMINOPHEN 1 TABLET: 5; 325 TABLET ORAL at 13:13

## 2025-07-23 RX ADMIN — ORPHENADRINE CITRATE 60 MG: 60 INJECTION INTRAMUSCULAR; INTRAVENOUS at 13:15

## 2025-07-23 RX ADMIN — KETOROLAC TROMETHAMINE 30 MG: 30 INJECTION, SOLUTION INTRAMUSCULAR at 13:18

## 2025-07-23 RX ADMIN — DEXAMETHASONE SODIUM PHOSPHATE 10 MG: 10 INJECTION INTRAMUSCULAR; INTRAVENOUS at 13:21

## 2025-07-23 ASSESSMENT — PAIN SCALES - GENERAL
PAINLEVEL_OUTOF10: 9
PAINLEVEL_OUTOF10: 9

## 2025-07-23 ASSESSMENT — PAIN - FUNCTIONAL ASSESSMENT: PAIN_FUNCTIONAL_ASSESSMENT: PREVENTS OR INTERFERES SOME ACTIVE ACTIVITIES AND ADLS

## 2025-07-23 ASSESSMENT — PAIN DESCRIPTION - ORIENTATION
ORIENTATION: RIGHT
ORIENTATION: RIGHT

## 2025-07-23 ASSESSMENT — PAIN DESCRIPTION - LOCATION
LOCATION: BACK
LOCATION: BACK

## 2025-07-23 ASSESSMENT — PAIN DESCRIPTION - DESCRIPTORS
DESCRIPTORS: BURNING
DESCRIPTORS: ACHING;SHARP;SHOOTING

## 2025-07-23 NOTE — ED PROVIDER NOTES
currently enrolled in an pain management program and takes Lyrica and did take an old oxycodone that he took yesterday from a prior surgery with no improvement.  He reports his pain management doctor moved to [place] and the next step was to have a nerve block.  Patient has not had any recent steroids.  Patient was ambulatory on arrival with no assistive devices.  Patient reports he is moving to [place] on Monday.            Differential diagnoses included but not limited to back strain sprain versus fracture versus degenerative disc disease acute exacerbation of chronic back pain sciatica.  Patient is afebrile and has no clinical signs of acute discitis at this time and no new injury and does not have midline bony tenderness that tenderness is all over the right SI joint he has history of having hip replacement with no signs of erythema or swelling to the hip.  Patient has no focal neurologic or sensory deficits on examination.  Straight leg lift is negative with intact distal pulses and no calf tenderness.  Patient has a benign abdominal examination no CVA tenderness and no urinary symptoms.  He has no systemic symptoms.      Patient was given Percocet, Lidoderm patch, Decadron Norflex and for their symptoms with moderate improvement. Patient continues to be afebrile and non-toxic on re-evaluation.    Patient has no signs of acute cauda equina syndrome at this time.  Patient has no focal neurologic or sensory deficits on examination.  Patient was able to demonstrate ambulation in the ED.  Patient has no clinical signs of acute discitis.     Findings were discussed with the patient and reasons to immediately return to the ED were articulated to them and instructed on red flag symptoms warranting immediate return.  Patient advised multiple course for lift anything greater than 5 pounds until you are reevaluated.  Patient will be given Medrol Dosepak with precautions to take with food, Lidoderm patches, short course of

## 2025-07-23 NOTE — DISCHARGE INSTRUCTIONS
Take food with Medrol Dosepak to avoid any stomach upset.  do not drink alcohol, drive or operate heavy equipment with muscle relaxants.

## 2025-08-18 DIAGNOSIS — R35.1 NOCTURIA: ICD-10-CM

## 2025-08-18 DIAGNOSIS — I10 ESSENTIAL HYPERTENSION: ICD-10-CM

## 2025-08-18 DIAGNOSIS — E03.9 ACQUIRED HYPOTHYROIDISM: ICD-10-CM

## 2025-08-19 RX ORDER — LEVOTHYROXINE SODIUM 25 UG/1
25 TABLET ORAL DAILY
Qty: 90 TABLET | Refills: 0 | Status: SHIPPED | OUTPATIENT
Start: 2025-08-19

## 2025-08-19 RX ORDER — VERAPAMIL HYDROCHLORIDE 240 MG/1
240 CAPSULE, DELAYED RELEASE ORAL NIGHTLY
Qty: 90 CAPSULE | Refills: 0 | Status: SHIPPED | OUTPATIENT
Start: 2025-08-19

## 2025-08-19 RX ORDER — CLONIDINE HYDROCHLORIDE 0.1 MG/1
0.2 TABLET ORAL EVERY EVENING
Qty: 180 TABLET | Refills: 0 | Status: SHIPPED | OUTPATIENT
Start: 2025-08-19

## 2025-08-19 RX ORDER — LISINOPRIL AND HYDROCHLOROTHIAZIDE 12.5; 2 MG/1; MG/1
1 TABLET ORAL DAILY
Qty: 90 TABLET | Refills: 0 | Status: SHIPPED | OUTPATIENT
Start: 2025-08-19

## 2025-08-19 RX ORDER — TAMSULOSIN HYDROCHLORIDE 0.4 MG/1
0.8 CAPSULE ORAL DAILY
Qty: 180 CAPSULE | Refills: 0 | Status: SHIPPED | OUTPATIENT
Start: 2025-08-19

## (undated) DEVICE — NEEDLE HYPO 18GA L1.5IN PNK POLYPR HUB S STL THN WALL FILL

## (undated) DEVICE — MEDI-VAC NON-CONDUCTIVE SUCTION TUBING: Brand: CARDINAL HEALTH

## (undated) DEVICE — STERILE POLYISOPRENE POWDER-FREE SURGICAL GLOVES WITH EMOLLIENT COATING: Brand: PROTEXIS

## (undated) DEVICE — Device

## (undated) DEVICE — DEVICE INFL PRSS G INDIC DISP (MUST BE PURC IN MULTIPLES OF 5)

## (undated) DEVICE — SOLUTION IRRIG 1000ML 09% SOD CHL USP PIC PLAS CONTAINER

## (undated) DEVICE — KNIFE SURG EAR S STL SHFT SCKL BLDE W/ POLYPR FLAT HNDL 6/PK

## (undated) DEVICE — SYRINGE TB 1ML NDL 27GA L0.5IN PLAS W/ SFTY LOK PERM NDL